# Patient Record
Sex: MALE | Race: WHITE | NOT HISPANIC OR LATINO | Employment: OTHER | URBAN - METROPOLITAN AREA
[De-identification: names, ages, dates, MRNs, and addresses within clinical notes are randomized per-mention and may not be internally consistent; named-entity substitution may affect disease eponyms.]

---

## 2018-05-14 ENCOUNTER — OFFICE VISIT CONVERTED (OUTPATIENT)
Dept: CARDIOLOGY | Facility: CLINIC | Age: 68
End: 2018-05-14
Attending: INTERNAL MEDICINE

## 2018-06-22 ENCOUNTER — OFFICE VISIT CONVERTED (OUTPATIENT)
Dept: FAMILY MEDICINE CLINIC | Facility: CLINIC | Age: 68
End: 2018-06-22
Attending: FAMILY MEDICINE

## 2018-11-19 ENCOUNTER — CONVERSION ENCOUNTER (OUTPATIENT)
Dept: FAMILY MEDICINE CLINIC | Facility: CLINIC | Age: 68
End: 2018-11-19

## 2018-11-19 ENCOUNTER — OFFICE VISIT CONVERTED (OUTPATIENT)
Dept: FAMILY MEDICINE CLINIC | Facility: CLINIC | Age: 68
End: 2018-11-19
Attending: NURSE PRACTITIONER

## 2018-11-26 ENCOUNTER — OFFICE VISIT CONVERTED (OUTPATIENT)
Dept: CARDIOLOGY | Facility: CLINIC | Age: 68
End: 2018-11-26
Attending: INTERNAL MEDICINE

## 2018-12-18 ENCOUNTER — OFFICE VISIT CONVERTED (OUTPATIENT)
Dept: SURGERY | Facility: CLINIC | Age: 68
End: 2018-12-18
Attending: SURGERY

## 2018-12-18 ENCOUNTER — OFFICE VISIT CONVERTED (OUTPATIENT)
Dept: FAMILY MEDICINE CLINIC | Facility: CLINIC | Age: 68
End: 2018-12-18
Attending: FAMILY MEDICINE

## 2019-03-20 ENCOUNTER — HOSPITAL ENCOUNTER (OUTPATIENT)
Dept: OTHER | Facility: HOSPITAL | Age: 69
Discharge: HOME | End: 2019-03-20
Attending: INTERNAL MEDICINE

## 2019-03-20 LAB
ALBUMIN SERPL-MCNC: 4.3 G/DL (ref 3.5–5)
ALBUMIN/GLOB SERPL: 1.3 {RATIO} (ref 1.4–2.6)
ALP SERPL-CCNC: 57 U/L (ref 56–155)
ALT SERPL-CCNC: 19 U/L (ref 10–40)
ANION GAP SERPL CALC-SCNC: 19 MMOL/L (ref 8–19)
AST SERPL-CCNC: 23 U/L (ref 15–50)
BILIRUB SERPL-MCNC: 0.37 MG/DL (ref 0.2–1.3)
BUN SERPL-MCNC: 25 MG/DL (ref 5–25)
BUN/CREAT SERPL: 19 {RATIO} (ref 6–20)
CALCIUM SERPL-MCNC: 9.5 MG/DL (ref 8.7–10.4)
CHLORIDE SERPL-SCNC: 101 MMOL/L (ref 99–111)
CHOLEST SERPL-MCNC: 123 MG/DL (ref 107–200)
CHOLEST/HDLC SERPL: 2.1 {RATIO} (ref 3–6)
CONV CO2: 26 MMOL/L (ref 22–32)
CONV TOTAL PROTEIN: 7.5 G/DL (ref 6.3–8.2)
CREAT UR-MCNC: 1.34 MG/DL (ref 0.7–1.2)
GFR SERPLBLD BASED ON 1.73 SQ M-ARVRAT: 53 ML/MIN/{1.73_M2}
GLOBULIN UR ELPH-MCNC: 3.2 G/DL (ref 2–3.5)
GLUCOSE SERPL-MCNC: 115 MG/DL (ref 70–99)
HDLC SERPL-MCNC: 58 MG/DL (ref 40–60)
LDLC SERPL CALC-MCNC: 55 MG/DL (ref 70–100)
OSMOLALITY SERPL CALC.SUM OF ELEC: 297 MOSM/KG (ref 273–304)
POTASSIUM SERPL-SCNC: 4.8 MMOL/L (ref 3.5–5.3)
SODIUM SERPL-SCNC: 141 MMOL/L (ref 135–147)
TRIGL SERPL-MCNC: 48 MG/DL (ref 40–150)
VLDLC SERPL-MCNC: 10 MG/DL (ref 5–37)

## 2019-04-22 ENCOUNTER — HOSPITAL ENCOUNTER (OUTPATIENT)
Dept: GASTROENTEROLOGY | Facility: HOSPITAL | Age: 69
Setting detail: HOSPITAL OUTPATIENT SURGERY
Discharge: HOME | End: 2019-04-22
Attending: SURGERY

## 2019-05-08 ENCOUNTER — HOSPITAL ENCOUNTER (OUTPATIENT)
Dept: LAB | Facility: HOSPITAL | Age: 69
Discharge: HOME | End: 2019-05-08
Attending: INTERNAL MEDICINE

## 2019-05-08 LAB
ANION GAP SERPL CALC-SCNC: 15 MMOL/L (ref 8–19)
BUN SERPL-MCNC: 26 MG/DL (ref 5–25)
BUN/CREAT SERPL: 20 {RATIO} (ref 6–20)
CALCIUM SERPL-MCNC: 9.4 MG/DL (ref 8.7–10.4)
CHLORIDE SERPL-SCNC: 102 MMOL/L (ref 99–111)
CONV CO2: 28 MMOL/L (ref 22–32)
CREAT UR-MCNC: 1.32 MG/DL (ref 0.7–1.2)
GFR SERPLBLD BASED ON 1.73 SQ M-ARVRAT: 54 ML/MIN/{1.73_M2}
GLUCOSE SERPL-MCNC: 128 MG/DL (ref 70–99)
OSMOLALITY SERPL CALC.SUM OF ELEC: 298 MOSM/KG (ref 273–304)
POTASSIUM SERPL-SCNC: 3.7 MMOL/L (ref 3.5–5.3)
SODIUM SERPL-SCNC: 141 MMOL/L (ref 135–147)

## 2019-05-29 ENCOUNTER — HOSPITAL ENCOUNTER (OUTPATIENT)
Dept: LAB | Facility: HOSPITAL | Age: 69
Discharge: HOME | End: 2019-05-29
Attending: INTERNAL MEDICINE

## 2019-05-29 LAB
ALBUMIN SERPL-MCNC: 4.3 G/DL (ref 3.5–5)
ALBUMIN/GLOB SERPL: 1.5 {RATIO} (ref 1.4–2.6)
ALP SERPL-CCNC: 59 U/L (ref 56–155)
ALT SERPL-CCNC: 18 U/L (ref 10–40)
ANION GAP SERPL CALC-SCNC: 18 MMOL/L (ref 8–19)
AST SERPL-CCNC: 25 U/L (ref 15–50)
BILIRUB SERPL-MCNC: 0.34 MG/DL (ref 0.2–1.3)
BUN SERPL-MCNC: 28 MG/DL (ref 5–25)
BUN/CREAT SERPL: 20 {RATIO} (ref 6–20)
CALCIUM SERPL-MCNC: 9.5 MG/DL (ref 8.7–10.4)
CHLORIDE SERPL-SCNC: 103 MMOL/L (ref 99–111)
CHOLEST SERPL-MCNC: 110 MG/DL (ref 107–200)
CHOLEST/HDLC SERPL: 2.2 {RATIO} (ref 3–6)
CONV CO2: 25 MMOL/L (ref 22–32)
CONV TOTAL PROTEIN: 7.2 G/DL (ref 6.3–8.2)
CREAT UR-MCNC: 1.38 MG/DL (ref 0.7–1.2)
GFR SERPLBLD BASED ON 1.73 SQ M-ARVRAT: 51 ML/MIN/{1.73_M2}
GLOBULIN UR ELPH-MCNC: 2.9 G/DL (ref 2–3.5)
GLUCOSE SERPL-MCNC: 118 MG/DL (ref 70–99)
HDLC SERPL-MCNC: 49 MG/DL (ref 40–60)
LDLC SERPL CALC-MCNC: 50 MG/DL (ref 70–100)
OSMOLALITY SERPL CALC.SUM OF ELEC: 301 MOSM/KG (ref 273–304)
POTASSIUM SERPL-SCNC: 4.4 MMOL/L (ref 3.5–5.3)
SODIUM SERPL-SCNC: 142 MMOL/L (ref 135–147)
TRIGL SERPL-MCNC: 55 MG/DL (ref 40–150)
VLDLC SERPL-MCNC: 11 MG/DL (ref 5–37)

## 2019-06-05 ENCOUNTER — CONVERSION ENCOUNTER (OUTPATIENT)
Dept: CARDIOLOGY | Facility: CLINIC | Age: 69
End: 2019-06-05

## 2019-06-05 ENCOUNTER — OFFICE VISIT CONVERTED (OUTPATIENT)
Dept: CARDIOLOGY | Facility: CLINIC | Age: 69
End: 2019-06-05
Attending: INTERNAL MEDICINE

## 2019-06-18 ENCOUNTER — HOSPITAL ENCOUNTER (OUTPATIENT)
Dept: LAB | Facility: HOSPITAL | Age: 69
Discharge: HOME | End: 2019-06-18
Attending: FAMILY MEDICINE

## 2019-06-18 ENCOUNTER — OFFICE VISIT CONVERTED (OUTPATIENT)
Dept: FAMILY MEDICINE CLINIC | Facility: CLINIC | Age: 69
End: 2019-06-18
Attending: FAMILY MEDICINE

## 2019-06-18 ENCOUNTER — CONVERSION ENCOUNTER (OUTPATIENT)
Dept: OTHER | Facility: HOSPITAL | Age: 69
End: 2019-06-18

## 2019-06-18 LAB
CONV CREATININE URINE, RANDOM: 52 MG/DL (ref 10–300)
CONV MICROALBUM.,U,RANDOM: 22.7 MG/L (ref 0–20)
EST. AVERAGE GLUCOSE BLD GHB EST-MCNC: 126 MG/DL
HBA1C MFR BLD: 6 % (ref 3.5–5.7)
MICROALBUMIN/CREAT UR: 43.7 MG/G{CRE} (ref 0–25)

## 2019-10-28 ENCOUNTER — OFFICE VISIT CONVERTED (OUTPATIENT)
Dept: FAMILY MEDICINE CLINIC | Facility: CLINIC | Age: 69
End: 2019-10-28
Attending: FAMILY MEDICINE

## 2019-11-26 ENCOUNTER — HOSPITAL ENCOUNTER (OUTPATIENT)
Dept: OTHER | Facility: HOSPITAL | Age: 69
Discharge: HOME | End: 2019-11-26
Attending: FAMILY MEDICINE

## 2019-11-26 LAB
ALBUMIN SERPL-MCNC: 4.3 G/DL (ref 3.5–5)
ALBUMIN/GLOB SERPL: 1.4 {RATIO} (ref 1.4–2.6)
ALP SERPL-CCNC: 61 U/L (ref 56–155)
ALT SERPL-CCNC: 16 U/L (ref 10–40)
ANION GAP SERPL CALC-SCNC: 16 MMOL/L (ref 8–19)
AST SERPL-CCNC: 21 U/L (ref 15–50)
BILIRUB SERPL-MCNC: 0.28 MG/DL (ref 0.2–1.3)
BUN SERPL-MCNC: 24 MG/DL (ref 5–25)
BUN/CREAT SERPL: 20 {RATIO} (ref 6–20)
CALCIUM SERPL-MCNC: 9.7 MG/DL (ref 8.7–10.4)
CHLORIDE SERPL-SCNC: 100 MMOL/L (ref 99–111)
CHOLEST SERPL-MCNC: 122 MG/DL (ref 107–200)
CHOLEST/HDLC SERPL: 2.5 {RATIO} (ref 3–6)
CONV CO2: 27 MMOL/L (ref 22–32)
CONV TOTAL PROTEIN: 7.4 G/DL (ref 6.3–8.2)
CREAT UR-MCNC: 1.22 MG/DL (ref 0.7–1.2)
EST. AVERAGE GLUCOSE BLD GHB EST-MCNC: 146 MG/DL
GFR SERPLBLD BASED ON 1.73 SQ M-ARVRAT: 59 ML/MIN/{1.73_M2}
GLOBULIN UR ELPH-MCNC: 3.1 G/DL (ref 2–3.5)
GLUCOSE SERPL-MCNC: 130 MG/DL (ref 70–99)
HBA1C MFR BLD: 6.7 % (ref 3.5–5.7)
HDLC SERPL-MCNC: 49 MG/DL (ref 40–60)
LDLC SERPL CALC-MCNC: 52 MG/DL (ref 70–100)
OSMOLALITY SERPL CALC.SUM OF ELEC: 294 MOSM/KG (ref 273–304)
POTASSIUM SERPL-SCNC: 4.3 MMOL/L (ref 3.5–5.3)
SODIUM SERPL-SCNC: 139 MMOL/L (ref 135–147)
TRIGL SERPL-MCNC: 105 MG/DL (ref 40–150)
VLDLC SERPL-MCNC: 21 MG/DL (ref 5–37)

## 2019-12-09 ENCOUNTER — OFFICE VISIT CONVERTED (OUTPATIENT)
Dept: CARDIOLOGY | Facility: CLINIC | Age: 69
End: 2019-12-09
Attending: INTERNAL MEDICINE

## 2019-12-09 ENCOUNTER — CONVERSION ENCOUNTER (OUTPATIENT)
Dept: CARDIOLOGY | Facility: CLINIC | Age: 69
End: 2019-12-09

## 2019-12-11 ENCOUNTER — CONVERSION ENCOUNTER (OUTPATIENT)
Dept: CARDIOLOGY | Facility: CLINIC | Age: 69
End: 2019-12-11
Attending: INTERNAL MEDICINE

## 2020-01-14 ENCOUNTER — OFFICE VISIT CONVERTED (OUTPATIENT)
Dept: ORTHOPEDIC SURGERY | Facility: CLINIC | Age: 70
End: 2020-01-14
Attending: ORTHOPAEDIC SURGERY

## 2020-01-14 ENCOUNTER — CONVERSION ENCOUNTER (OUTPATIENT)
Dept: ORTHOPEDIC SURGERY | Facility: CLINIC | Age: 70
End: 2020-01-14

## 2020-01-20 ENCOUNTER — OFFICE VISIT CONVERTED (OUTPATIENT)
Dept: SURGERY | Facility: CLINIC | Age: 70
End: 2020-01-20
Attending: SURGERY

## 2020-04-28 ENCOUNTER — TELEPHONE CONVERTED (OUTPATIENT)
Dept: FAMILY MEDICINE CLINIC | Facility: CLINIC | Age: 70
End: 2020-04-28
Attending: INTERNAL MEDICINE

## 2020-06-01 ENCOUNTER — HOSPITAL ENCOUNTER (OUTPATIENT)
Dept: LAB | Facility: HOSPITAL | Age: 70
Discharge: HOME | End: 2020-06-01
Attending: INTERNAL MEDICINE

## 2020-06-01 LAB
ALBUMIN SERPL-MCNC: 4.3 G/DL (ref 3.5–5)
ALBUMIN/GLOB SERPL: 1.3 {RATIO} (ref 1.4–2.6)
ALP SERPL-CCNC: 64 U/L (ref 56–155)
ALT SERPL-CCNC: 13 U/L (ref 10–40)
ANION GAP SERPL CALC-SCNC: 17 MMOL/L (ref 8–19)
AST SERPL-CCNC: 20 U/L (ref 15–50)
BILIRUB SERPL-MCNC: 0.36 MG/DL (ref 0.2–1.3)
BUN SERPL-MCNC: 25 MG/DL (ref 5–25)
BUN/CREAT SERPL: 20 {RATIO} (ref 6–20)
CALCIUM SERPL-MCNC: 9.8 MG/DL (ref 8.7–10.4)
CHLORIDE SERPL-SCNC: 100 MMOL/L (ref 99–111)
CHOLEST SERPL-MCNC: 125 MG/DL (ref 107–200)
CHOLEST/HDLC SERPL: 2.5 {RATIO} (ref 3–6)
CONV CO2: 25 MMOL/L (ref 22–32)
CONV TOTAL PROTEIN: 7.7 G/DL (ref 6.3–8.2)
CREAT UR-MCNC: 1.24 MG/DL (ref 0.7–1.2)
GFR SERPLBLD BASED ON 1.73 SQ M-ARVRAT: 58 ML/MIN/{1.73_M2}
GLOBULIN UR ELPH-MCNC: 3.4 G/DL (ref 2–3.5)
GLUCOSE SERPL-MCNC: 120 MG/DL (ref 70–99)
HDLC SERPL-MCNC: 51 MG/DL (ref 40–60)
LDLC SERPL CALC-MCNC: 57 MG/DL (ref 70–100)
OSMOLALITY SERPL CALC.SUM OF ELEC: 290 MOSM/KG (ref 273–304)
POTASSIUM SERPL-SCNC: 4.6 MMOL/L (ref 3.5–5.3)
SODIUM SERPL-SCNC: 137 MMOL/L (ref 135–147)
T4 FREE SERPL-MCNC: 1.1 NG/DL (ref 0.9–1.8)
TRIGL SERPL-MCNC: 87 MG/DL (ref 40–150)
TSH SERPL-ACNC: 2.12 M[IU]/L (ref 0.27–4.2)
VLDLC SERPL-MCNC: 17 MG/DL (ref 5–37)

## 2020-07-02 ENCOUNTER — OFFICE VISIT CONVERTED (OUTPATIENT)
Dept: CARDIOLOGY | Facility: CLINIC | Age: 70
End: 2020-07-02
Attending: INTERNAL MEDICINE

## 2020-07-06 ENCOUNTER — HOSPITAL ENCOUNTER (OUTPATIENT)
Dept: PREADMISSION TESTING | Facility: HOSPITAL | Age: 70
Discharge: HOME | End: 2020-07-06
Attending: SURGERY

## 2020-07-06 ENCOUNTER — CONVERSION ENCOUNTER (OUTPATIENT)
Dept: PREADMISSION TESTING | Facility: HOSPITAL | Age: 70
End: 2020-07-06

## 2020-07-06 LAB
ANION GAP SERPL CALC-SCNC: 14 MMOL/L (ref 8–19)
BUN SERPL-MCNC: 28 MG/DL (ref 5–25)
BUN/CREAT SERPL: 21 {RATIO} (ref 6–20)
CALCIUM SERPL-MCNC: 9.6 MG/DL (ref 8.7–10.4)
CHLORIDE SERPL-SCNC: 103 MMOL/L (ref 99–111)
CONV CO2: 25 MMOL/L (ref 22–32)
CREAT UR-MCNC: 1.32 MG/DL (ref 0.7–1.2)
GFR SERPLBLD BASED ON 1.73 SQ M-ARVRAT: 54 ML/MIN/{1.73_M2}
GLUCOSE SERPL-MCNC: 139 MG/DL (ref 70–99)
OSMOLALITY SERPL CALC.SUM OF ELEC: 292 MOSM/KG (ref 273–304)
POTASSIUM SERPL-SCNC: 4.7 MMOL/L (ref 3.5–5.3)
SODIUM SERPL-SCNC: 137 MMOL/L (ref 135–147)

## 2020-07-07 LAB — SARS-COV-2 RNA SPEC QL NAA+PROBE: NOT DETECTED

## 2020-07-08 ENCOUNTER — HOSPITAL ENCOUNTER (OUTPATIENT)
Dept: PERIOP | Facility: HOSPITAL | Age: 70
Setting detail: HOSPITAL OUTPATIENT SURGERY
Discharge: HOME | End: 2020-07-08
Attending: SURGERY

## 2020-07-21 ENCOUNTER — OFFICE VISIT CONVERTED (OUTPATIENT)
Dept: SURGERY | Facility: CLINIC | Age: 70
End: 2020-07-21
Attending: SURGERY

## 2020-09-25 ENCOUNTER — OFFICE VISIT CONVERTED (OUTPATIENT)
Dept: SURGERY | Facility: CLINIC | Age: 70
End: 2020-09-25
Attending: SURGERY

## 2020-10-16 ENCOUNTER — HOSPITAL ENCOUNTER (OUTPATIENT)
Dept: GENERAL RADIOLOGY | Facility: HOSPITAL | Age: 70
Discharge: HOME | End: 2020-10-16
Attending: SURGERY

## 2020-10-20 ENCOUNTER — OFFICE VISIT CONVERTED (OUTPATIENT)
Dept: SURGERY | Facility: CLINIC | Age: 70
End: 2020-10-20
Attending: SURGERY

## 2020-10-20 ENCOUNTER — CONVERSION ENCOUNTER (OUTPATIENT)
Dept: SURGERY | Facility: CLINIC | Age: 70
End: 2020-10-20

## 2020-10-20 ENCOUNTER — OFFICE VISIT CONVERTED (OUTPATIENT)
Dept: ORTHOPEDIC SURGERY | Facility: CLINIC | Age: 70
End: 2020-10-20
Attending: ORTHOPAEDIC SURGERY

## 2020-10-20 ENCOUNTER — CONVERSION ENCOUNTER (OUTPATIENT)
Dept: ORTHOPEDIC SURGERY | Facility: CLINIC | Age: 70
End: 2020-10-20

## 2020-10-28 ENCOUNTER — HOSPITAL ENCOUNTER (OUTPATIENT)
Dept: ULTRASOUND IMAGING | Facility: HOSPITAL | Age: 70
Discharge: HOME | End: 2020-10-28
Attending: SURGERY

## 2020-10-28 ENCOUNTER — HOSPITAL ENCOUNTER (OUTPATIENT)
Dept: LAB | Facility: HOSPITAL | Age: 70
Discharge: HOME | End: 2020-10-28
Attending: PSYCHIATRY & NEUROLOGY

## 2020-10-28 ENCOUNTER — OFFICE VISIT CONVERTED (OUTPATIENT)
Dept: FAMILY MEDICINE CLINIC | Facility: CLINIC | Age: 70
End: 2020-10-28
Attending: INTERNAL MEDICINE

## 2020-10-28 LAB
ALBUMIN SERPL-MCNC: 4.2 G/DL (ref 3.5–5)
ALBUMIN/GLOB SERPL: 1.2 {RATIO} (ref 1.4–2.6)
ALP SERPL-CCNC: 65 U/L (ref 56–155)
ALT SERPL-CCNC: 11 U/L (ref 10–40)
ANION GAP SERPL CALC-SCNC: 17 MMOL/L (ref 8–19)
AST SERPL-CCNC: 16 U/L (ref 15–50)
BASOPHILS # BLD AUTO: 0.07 10*3/UL (ref 0–0.2)
BASOPHILS NFR BLD AUTO: 0.8 % (ref 0–3)
BILIRUB SERPL-MCNC: 0.23 MG/DL (ref 0.2–1.3)
BUN SERPL-MCNC: 31 MG/DL (ref 5–25)
BUN/CREAT SERPL: 22 {RATIO} (ref 6–20)
CALCIUM SERPL-MCNC: 10.2 MG/DL (ref 8.7–10.4)
CHLORIDE SERPL-SCNC: 102 MMOL/L (ref 99–111)
CHOLEST SERPL-MCNC: 123 MG/DL (ref 107–200)
CHOLEST/HDLC SERPL: 2.6 {RATIO} (ref 3–6)
CONV ABS IMM GRAN: 0.02 10*3/UL (ref 0–0.2)
CONV CO2: 26 MMOL/L (ref 22–32)
CONV CREATININE URINE, RANDOM: 102.2 MG/DL (ref 10–300)
CONV IMMATURE GRAN: 0.2 % (ref 0–1.8)
CONV MICROALBUM.,U,RANDOM: 70.6 MG/L (ref 0–20)
CONV TOTAL PROTEIN: 7.7 G/DL (ref 6.3–8.2)
CREAT UR-MCNC: 1.41 MG/DL (ref 0.7–1.2)
DEPRECATED RDW RBC AUTO: 47.9 FL (ref 35.1–43.9)
EOSINOPHIL # BLD AUTO: 0.35 10*3/UL (ref 0–0.7)
EOSINOPHIL # BLD AUTO: 4 % (ref 0–7)
ERYTHROCYTE [DISTWIDTH] IN BLOOD BY AUTOMATED COUNT: 14.1 % (ref 11.6–14.4)
EST. AVERAGE GLUCOSE BLD GHB EST-MCNC: 143 MG/DL
GFR SERPLBLD BASED ON 1.73 SQ M-ARVRAT: 49 ML/MIN/{1.73_M2}
GLOBULIN UR ELPH-MCNC: 3.5 G/DL (ref 2–3.5)
GLUCOSE SERPL-MCNC: 124 MG/DL (ref 70–99)
HBA1C MFR BLD: 6.6 % (ref 3.5–5.7)
HCT VFR BLD AUTO: 36.8 % (ref 42–52)
HDLC SERPL-MCNC: 48 MG/DL (ref 40–60)
HGB BLD-MCNC: 11.3 G/DL (ref 14–18)
LDLC SERPL CALC-MCNC: 62 MG/DL (ref 70–100)
LYMPHOCYTES # BLD AUTO: 1.18 10*3/UL (ref 1–5)
LYMPHOCYTES NFR BLD AUTO: 13.6 % (ref 20–45)
MCH RBC QN AUTO: 28.2 PG (ref 27–31)
MCHC RBC AUTO-ENTMCNC: 30.7 G/DL (ref 33–37)
MCV RBC AUTO: 91.8 FL (ref 80–96)
MICROALBUMIN/CREAT UR: 69.1 MG/G{CRE} (ref 0–25)
MONOCYTES # BLD AUTO: 0.86 10*3/UL (ref 0.2–1.2)
MONOCYTES NFR BLD AUTO: 9.9 % (ref 3–10)
NEUTROPHILS # BLD AUTO: 6.22 10*3/UL (ref 2–8)
NEUTROPHILS NFR BLD AUTO: 71.5 % (ref 30–85)
NRBC CBCN: 0 % (ref 0–0.7)
OSMOLALITY SERPL CALC.SUM OF ELEC: 300 MOSM/KG (ref 273–304)
PLATELET # BLD AUTO: 339 10*3/UL (ref 130–400)
PMV BLD AUTO: 9.2 FL (ref 9.4–12.4)
POTASSIUM SERPL-SCNC: 4.2 MMOL/L (ref 3.5–5.3)
RBC # BLD AUTO: 4.01 10*6/UL (ref 4.7–6.1)
SODIUM SERPL-SCNC: 141 MMOL/L (ref 135–147)
TRIGL SERPL-MCNC: 64 MG/DL (ref 40–150)
VLDLC SERPL-MCNC: 13 MG/DL (ref 5–37)
WBC # BLD AUTO: 8.7 10*3/UL (ref 4.8–10.8)

## 2020-11-25 ENCOUNTER — HOSPITAL ENCOUNTER (OUTPATIENT)
Dept: LAB | Facility: HOSPITAL | Age: 70
Discharge: HOME | End: 2020-11-25
Attending: PSYCHIATRY & NEUROLOGY

## 2020-11-25 LAB
ANION GAP SERPL CALC-SCNC: 17 MMOL/L (ref 8–19)
BUN SERPL-MCNC: 26 MG/DL (ref 5–25)
BUN/CREAT SERPL: 17 {RATIO} (ref 6–20)
CALCIUM SERPL-MCNC: 10.2 MG/DL (ref 8.7–10.4)
CHLORIDE SERPL-SCNC: 105 MMOL/L (ref 99–111)
CONV CO2: 24 MMOL/L (ref 22–32)
CREAT UR-MCNC: 1.49 MG/DL (ref 0.7–1.2)
FERRITIN SERPL-MCNC: 173 NG/ML (ref 30–300)
GFR SERPLBLD BASED ON 1.73 SQ M-ARVRAT: 46 ML/MIN/{1.73_M2}
GLUCOSE SERPL-MCNC: 108 MG/DL (ref 70–99)
IRON SATN MFR SERPL: 17 % (ref 20–55)
IRON SERPL-MCNC: 53 UG/DL (ref 70–180)
OSMOLALITY SERPL CALC.SUM OF ELEC: 297 MOSM/KG (ref 273–304)
POTASSIUM SERPL-SCNC: 4.7 MMOL/L (ref 3.5–5.3)
SODIUM SERPL-SCNC: 141 MMOL/L (ref 135–147)
TIBC SERPL-MCNC: 320 UG/DL (ref 245–450)
TRANSFERRIN SERPL-MCNC: 224 MG/DL (ref 215–365)

## 2020-12-01 ENCOUNTER — OFFICE VISIT CONVERTED (OUTPATIENT)
Dept: FAMILY MEDICINE CLINIC | Facility: CLINIC | Age: 70
End: 2020-12-01
Attending: INTERNAL MEDICINE

## 2020-12-01 ENCOUNTER — CONVERSION ENCOUNTER (OUTPATIENT)
Dept: FAMILY MEDICINE CLINIC | Facility: CLINIC | Age: 70
End: 2020-12-01

## 2020-12-08 ENCOUNTER — OFFICE VISIT CONVERTED (OUTPATIENT)
Dept: ORTHOPEDIC SURGERY | Facility: CLINIC | Age: 70
End: 2020-12-08
Attending: PHYSICIAN ASSISTANT

## 2020-12-18 ENCOUNTER — HOSPITAL ENCOUNTER (OUTPATIENT)
Dept: LAB | Facility: HOSPITAL | Age: 70
Discharge: HOME | End: 2020-12-18
Attending: NURSE PRACTITIONER

## 2020-12-18 LAB
ALBUMIN SERPL-MCNC: 4.4 G/DL (ref 3.5–5)
ALBUMIN/GLOB SERPL: 1.3 {RATIO} (ref 1.4–2.6)
ALP SERPL-CCNC: 68 U/L (ref 56–155)
ALT SERPL-CCNC: 14 U/L (ref 10–40)
ANION GAP SERPL CALC-SCNC: 18 MMOL/L (ref 8–19)
AST SERPL-CCNC: 20 U/L (ref 15–50)
BASOPHILS # BLD AUTO: 0.08 10*3/UL (ref 0–0.2)
BASOPHILS NFR BLD AUTO: 1.1 % (ref 0–3)
BILIRUB SERPL-MCNC: 0.24 MG/DL (ref 0.2–1.3)
BUN SERPL-MCNC: 37 MG/DL (ref 5–25)
BUN/CREAT SERPL: 23 {RATIO} (ref 6–20)
CALCIUM SERPL-MCNC: 10.3 MG/DL (ref 8.7–10.4)
CHLORIDE SERPL-SCNC: 102 MMOL/L (ref 99–111)
CHOLEST SERPL-MCNC: 121 MG/DL (ref 107–200)
CHOLEST/HDLC SERPL: 2.3 {RATIO} (ref 3–6)
CONV ABS IMM GRAN: 0.02 10*3/UL (ref 0–0.2)
CONV CO2: 25 MMOL/L (ref 22–32)
CONV IMMATURE GRAN: 0.3 % (ref 0–1.8)
CONV TOTAL PROTEIN: 7.7 G/DL (ref 6.3–8.2)
CREAT UR-MCNC: 1.6 MG/DL (ref 0.7–1.2)
DEPRECATED RDW RBC AUTO: 48.4 FL (ref 35.1–43.9)
EOSINOPHIL # BLD AUTO: 0.33 10*3/UL (ref 0–0.7)
EOSINOPHIL # BLD AUTO: 4.3 % (ref 0–7)
ERYTHROCYTE [DISTWIDTH] IN BLOOD BY AUTOMATED COUNT: 14.6 % (ref 11.6–14.4)
GFR SERPLBLD BASED ON 1.73 SQ M-ARVRAT: 42 ML/MIN/{1.73_M2}
GLOBULIN UR ELPH-MCNC: 3.3 G/DL (ref 2–3.5)
GLUCOSE SERPL-MCNC: 117 MG/DL (ref 70–99)
HCT VFR BLD AUTO: 38.1 % (ref 42–52)
HDLC SERPL-MCNC: 53 MG/DL (ref 40–60)
HGB BLD-MCNC: 11.9 G/DL (ref 14–18)
LDLC SERPL CALC-MCNC: 57 MG/DL (ref 70–100)
LYMPHOCYTES # BLD AUTO: 1.25 10*3/UL (ref 1–5)
LYMPHOCYTES NFR BLD AUTO: 16.4 % (ref 20–45)
MCH RBC QN AUTO: 28 PG (ref 27–31)
MCHC RBC AUTO-ENTMCNC: 31.2 G/DL (ref 33–37)
MCV RBC AUTO: 89.6 FL (ref 80–96)
MONOCYTES # BLD AUTO: 0.78 10*3/UL (ref 0.2–1.2)
MONOCYTES NFR BLD AUTO: 10.2 % (ref 3–10)
NEUTROPHILS # BLD AUTO: 5.15 10*3/UL (ref 2–8)
NEUTROPHILS NFR BLD AUTO: 67.7 % (ref 30–85)
NRBC CBCN: 0 % (ref 0–0.7)
OSMOLALITY SERPL CALC.SUM OF ELEC: 300 MOSM/KG (ref 273–304)
PLATELET # BLD AUTO: 305 10*3/UL (ref 130–400)
PMV BLD AUTO: 9.6 FL (ref 9.4–12.4)
POTASSIUM SERPL-SCNC: 4.7 MMOL/L (ref 3.5–5.3)
RBC # BLD AUTO: 4.25 10*6/UL (ref 4.7–6.1)
SODIUM SERPL-SCNC: 140 MMOL/L (ref 135–147)
TRIGL SERPL-MCNC: 56 MG/DL (ref 40–150)
VLDLC SERPL-MCNC: 11 MG/DL (ref 5–37)
WBC # BLD AUTO: 7.61 10*3/UL (ref 4.8–10.8)

## 2021-01-04 ENCOUNTER — OFFICE VISIT CONVERTED (OUTPATIENT)
Dept: CARDIOLOGY | Facility: CLINIC | Age: 71
End: 2021-01-04
Attending: INTERNAL MEDICINE

## 2021-01-12 ENCOUNTER — HOSPITAL ENCOUNTER (OUTPATIENT)
Dept: OTHER | Facility: HOSPITAL | Age: 71
Discharge: HOME | End: 2021-01-12
Attending: STUDENT IN AN ORGANIZED HEALTH CARE EDUCATION/TRAINING PROGRAM

## 2021-01-12 ENCOUNTER — OFFICE VISIT CONVERTED (OUTPATIENT)
Dept: INTERNAL MEDICINE | Facility: CLINIC | Age: 71
End: 2021-01-12
Attending: STUDENT IN AN ORGANIZED HEALTH CARE EDUCATION/TRAINING PROGRAM

## 2021-01-12 LAB
ALBUMIN SERPL-MCNC: 4.1 G/DL (ref 3.5–5)
ALBUMIN/GLOB SERPL: 1.3 {RATIO} (ref 1.4–2.6)
ALP SERPL-CCNC: 51 U/L (ref 56–155)
ALT SERPL-CCNC: 11 U/L (ref 10–40)
ANION GAP SERPL CALC-SCNC: 16 MMOL/L (ref 8–19)
AST SERPL-CCNC: 21 U/L (ref 15–50)
BILIRUB SERPL-MCNC: 0.47 MG/DL (ref 0.2–1.3)
BUN SERPL-MCNC: 21 MG/DL (ref 5–25)
BUN/CREAT SERPL: 23 {RATIO} (ref 6–20)
CALCIUM SERPL-MCNC: 9.7 MG/DL (ref 8.7–10.4)
CHLORIDE SERPL-SCNC: 105 MMOL/L (ref 99–111)
CHOLEST SERPL-MCNC: 219 MG/DL (ref 107–200)
CHOLEST/HDLC SERPL: 2.2 {RATIO} (ref 3–6)
CONV CO2: 25 MMOL/L (ref 22–32)
CONV CREATININE URINE, RANDOM: 47.6 MG/DL (ref 10–300)
CONV MICROALBUM.,U,RANDOM: 53 MG/L (ref 0–20)
CONV TOTAL PROTEIN: 7.3 G/DL (ref 6.3–8.2)
CREAT UR-MCNC: 0.93 MG/DL (ref 0.7–1.2)
EST. AVERAGE GLUCOSE BLD GHB EST-MCNC: 131 MG/DL
FERRITIN SERPL-MCNC: 61 NG/ML (ref 30–300)
FOLATE SERPL-MCNC: >20 NG/ML (ref 4.8–20)
GFR SERPLBLD BASED ON 1.73 SQ M-ARVRAT: >60 ML/MIN/{1.73_M2}
GLOBULIN UR ELPH-MCNC: 3.2 G/DL (ref 2–3.5)
GLUCOSE SERPL-MCNC: 80 MG/DL (ref 70–99)
HBA1C MFR BLD: 6.2 % (ref 3.5–5.7)
HDLC SERPL-MCNC: 98 MG/DL (ref 40–60)
IRON SATN MFR SERPL: 32 % (ref 20–55)
IRON SERPL-MCNC: 117 UG/DL (ref 70–180)
LDLC SERPL CALC-MCNC: 109 MG/DL (ref 70–100)
MICROALBUMIN/CREAT UR: 111.3 MG/G{CRE} (ref 0–25)
OSMOLALITY SERPL CALC.SUM OF ELEC: 296 MOSM/KG (ref 273–304)
POTASSIUM SERPL-SCNC: 4.2 MMOL/L (ref 3.5–5.3)
RBC # BLD AUTO: 4.22 10*6/UL (ref 4.7–6.1)
RETICS # AUTO: 0.04 10*6/UL (ref 0.03–0.1)
RETICS/RBC NFR AUTO: 0.98 % (ref 0.51–1.81)
SODIUM SERPL-SCNC: 142 MMOL/L (ref 135–147)
TIBC SERPL-MCNC: 369 UG/DL (ref 245–450)
TRANSFERRIN SERPL-MCNC: 258 MG/DL (ref 215–365)
TRIGL SERPL-MCNC: 62 MG/DL (ref 40–150)
VIT B12 SERPL-MCNC: 629 PG/ML (ref 211–911)
VLDLC SERPL-MCNC: 12 MG/DL (ref 5–37)
WBC # BLD AUTO: 7.39 10*3/UL (ref 4.8–10.8)

## 2021-01-21 ENCOUNTER — HOSPITAL ENCOUNTER (OUTPATIENT)
Dept: GENERAL RADIOLOGY | Facility: HOSPITAL | Age: 71
Discharge: HOME | End: 2021-01-21
Attending: STUDENT IN AN ORGANIZED HEALTH CARE EDUCATION/TRAINING PROGRAM

## 2021-02-03 ENCOUNTER — HOSPITAL ENCOUNTER (OUTPATIENT)
Dept: LAB | Facility: HOSPITAL | Age: 71
Discharge: HOME | End: 2021-02-03
Attending: INTERNAL MEDICINE

## 2021-02-03 LAB
ANION GAP SERPL CALC-SCNC: 18 MMOL/L (ref 8–19)
BUN SERPL-MCNC: 28 MG/DL (ref 5–25)
BUN/CREAT SERPL: 25 {RATIO} (ref 6–20)
CALCIUM SERPL-MCNC: 9.6 MG/DL (ref 8.7–10.4)
CHLORIDE SERPL-SCNC: 103 MMOL/L (ref 99–111)
CONV CO2: 24 MMOL/L (ref 22–32)
CREAT UR-MCNC: 1.12 MG/DL (ref 0.7–1.2)
GFR SERPLBLD BASED ON 1.73 SQ M-ARVRAT: >60 ML/MIN/{1.73_M2}
GLUCOSE SERPL-MCNC: 93 MG/DL (ref 70–99)
OSMOLALITY SERPL CALC.SUM OF ELEC: 295 MOSM/KG (ref 273–304)
POTASSIUM SERPL-SCNC: 4.6 MMOL/L (ref 3.5–5.3)
SODIUM SERPL-SCNC: 140 MMOL/L (ref 135–147)

## 2021-03-31 ENCOUNTER — HOSPITAL ENCOUNTER (OUTPATIENT)
Dept: LAB | Facility: HOSPITAL | Age: 71
Discharge: HOME | End: 2021-03-31
Attending: NURSE PRACTITIONER

## 2021-03-31 LAB
ANION GAP SERPL CALC-SCNC: 14 MMOL/L (ref 8–19)
BUN SERPL-MCNC: 26 MG/DL (ref 5–25)
BUN/CREAT SERPL: 20 {RATIO} (ref 6–20)
CALCIUM SERPL-MCNC: 9.6 MG/DL (ref 8.7–10.4)
CHLORIDE SERPL-SCNC: 104 MMOL/L (ref 99–111)
CONV CO2: 26 MMOL/L (ref 22–32)
CREAT UR-MCNC: 1.32 MG/DL (ref 0.7–1.2)
GFR SERPLBLD BASED ON 1.73 SQ M-ARVRAT: 53 ML/MIN/{1.73_M2}
GLUCOSE SERPL-MCNC: 105 MG/DL (ref 70–99)
OSMOLALITY SERPL CALC.SUM OF ELEC: 295 MOSM/KG (ref 273–304)
POTASSIUM SERPL-SCNC: 4.4 MMOL/L (ref 3.5–5.3)
SODIUM SERPL-SCNC: 140 MMOL/L (ref 135–147)

## 2021-04-01 ENCOUNTER — CONVERSION ENCOUNTER (OUTPATIENT)
Dept: INTERNAL MEDICINE | Facility: CLINIC | Age: 71
End: 2021-04-01

## 2021-04-01 ENCOUNTER — OFFICE VISIT CONVERTED (OUTPATIENT)
Dept: INTERNAL MEDICINE | Facility: CLINIC | Age: 71
End: 2021-04-01
Attending: STUDENT IN AN ORGANIZED HEALTH CARE EDUCATION/TRAINING PROGRAM

## 2021-04-23 ENCOUNTER — CONVERSION ENCOUNTER (OUTPATIENT)
Dept: INTERNAL MEDICINE | Facility: CLINIC | Age: 71
End: 2021-04-23

## 2021-05-10 NOTE — H&P
History and Physical      Patient Name: Toñito Kuo   Patient ID: [id number]   Sex: Male   YOB: 1950    Primary Care Provider: Gurmeet Kerns DO   Referring Provider: Gurmeet Kerns DO    Visit Date: October 20, 2020    Provider: Justin Burris MD   Location: [facility]   Location Address: [address] [place] [address]  [id number]   Location Phone: [phone]          Chief Complaint  · Follow Up Office Visit      History Of Present Illness     Mr. Kuo came in today for evaluation. He is continuing to have the same left groin pain. We got a CT scan and he does have a fair amount of fat running along with his left spermatic cord but I don't see a definite recurrence on that scan. His mesh appears to be in good position. He has continued to have the same kind of discomfort. He wouldn't really say it is much better than a month ago.       Past Medical History  CAD (coronary artery disease); Cancer; Chest pain; Coronary atherosclerosis; of native coronary vessel; GERD; Heart Attack; High cholesterol; History of coronary artery disease; Hyperlipemia; Hypertension; Seasonal allergies; Sleep apnea         Past Surgical History  Appendectomy; Colonoscopy; Heart Bypass; heart surgery; Hernia; Testicle Surgery         Medication List  aspirin 81 mg Oral tablet,delayed release (DR/EC); Cialis 10 mg oral tablet; Eliquis 5 mg oral tablet; losartan-hydrochlorothiazide 100-25 mg oral tablet; metoprolol succinate 50 mg oral tablet extended release 24 hr; Nitrostat 0.4 mg sublingual tablet, sublingual; Norco 5-325 mg oral tablet; One-A-Day Men's 50+ Advantage 400- mcg Oral tablet; rosuvastatin 40 mg oral tablet; tramadol 50 mg oral tablet; Voltaren 1 % topical gel         Allergy List  PENICILLINS         Family Medical History  Breast Neoplasm, Malignant; Cancer, Unspecified; Colon Neoplasm; Diabetes, unspecified type; Lung cancer; Melanoma         Social  "History  Alcohol (Current every day); Alcohol Use; Heavy Amount of Exercise (4 or more times weekly) (Unknown); lives with spouse;  (Unknown); .; No known infection risk (Unknown); Recreational Drug Use (Never); Retired.; Tobacco (Never)         Review of Systems  · Cardiovascular  o Denies  o : chest pain, irregular heart beats, rapid heart rate, chest pain on exertion, shortness of breath, lower extremity swelling  · Respiratory  o Denies  o : shortness of breath, wheezing, cough, wheezing, chronic cough, coughing up blood  · Gastrointestinal  o Denies  o : nausea, vomiting, diarrhea, chronic abdominal pain, reflux symptoms      Vitals  Date Time BP Position Site L\R Cuff Size HR RR TEMP (F) WT  HT  BMI kg/m2 BSA m2 O2 Sat FR L/min FiO2 HC       10/20/2020 11:07 AM      64 - R   253lbs 16oz 5'  11\" 35.43 2.4 98 %      10/20/2020 01:05 PM      70 - R   252lbs 16oz 6'   34.31 2.41             Physical Examination     Today on physical exam, again, I don't feel a definite recurrence there.           Assessment  · Postoperative Exam Following Surgery     V67.00       Persistent left groin pain in a gentleman who has had a bilateral robotic inguinal hernia repair. I don't feel a definite recurrence today on physical exam and he does have a fair amount of fat running along with his cord on CT scan but I don't see a definite recurrence there. The mesh appears to be in in good position.     Problems Reconciled  Plan  · Orders  o Ultrasound testicular (07989) - - 10/20/2020  · Medications  o Medications have been Reconciled  o Transition of Care or Provider Policy     We are going to go ahead and prescribe him some Tramadol for the pain and see if that will help. We are also going to get a left testicular ultrasound. He has a history of an undescended left testicle and had a procedure to descend the testicle back when he was a teenager. He is somewhat concerned about the possibility of pain there. We will " await the results of that testicular ultrasound. I did tell him that we could potentially prescribe him some Gabapentin and see if that would help. He wanted to get his ultrasound first and then he will make a decision about whether he might want to try Gabapentin then.             Electronically Signed by: Analilia Barrientos-, -Author on October 21, 2020 09:31:55 AM  Electronically Co-signed by: Justin Burris MD -Reviewer on October 22, 2020 01:49:29 PM

## 2021-05-10 NOTE — H&P
History and Physical      Patient Name: Toñito Kuo   Patient ID: [id number]   Sex: Male   YOB: 1950    Primary Care Provider: Gurmeet Kerns DO   Referring Provider: Gurmeet Kerns DO    Visit Date: October 20, 2020    Provider: Ravinder Hawley MD   Location: [facility]   Location Address: [address] [place] [address]  [id number]   Location Phone: [phone]          Chief Complaint  · Bilateral Hip Pain      History Of Present Illness  Toñito Kuo is a 70 year old /White male who presents today to [facility].      Patient presents today with a chief complaint of bilateral hip pain. Patient initially made appointment for his right hip but his left hip started aggravating him instead. Patient states recently he went on a trip where they were driving a lot and hiking. Patient states one day he hiked 3 miles and went down to a waterfall where the trail was bumpy. Patient states that it caused him a lot of pain in his left hip. Patient states he has trouble putting his sock on and lifting his hip. Patient states he thought he was overdoing it on his trip. Patient states his right hip isn't bothering him much today. Patient had hernia surgery recently and states it could be his hip that is causing him pain or maybe post-op issues from the surgery. Patient will be seen for his hernia later this afternoon. Patient states pain is on the lateral aspect of his left hip. Patient states that he has been having difficulty sleeping at night due to his hip pain.       Past Medical History  CAD (coronary artery disease); Cancer; Chest pain; Coronary atherosclerosis; of native coronary vessel; GERD; Heart Attack; High cholesterol; History of coronary artery disease; Hyperlipemia; Hypertension; Seasonal allergies; Sleep apnea         Past Surgical History  Appendectomy; Colonoscopy; Heart Bypass; heart surgery; Hernia; Testicle Surgery         Medication List  aspirin  81 mg Oral tablet,delayed release (DR/EC); Cialis 10 mg oral tablet; Eliquis 5 mg oral tablet; losartan-hydrochlorothiazide 100-25 mg oral tablet; meloxicam 15 mg oral tablet; metoprolol succinate 50 mg oral tablet extended release 24 hr; Nitrostat 0.4 mg sublingual tablet, sublingual; Norco 5-325 mg oral tablet; One-A-Day Men's 50+ Advantage 400- mcg Oral tablet; rosuvastatin 40 mg oral tablet; tramadol 50 mg oral tablet; Voltaren 1 % topical gel         Allergy List  PENICILLINS       Allergies Reconciled  Family Medical History  Breast Neoplasm, Malignant; Cancer, Unspecified; Colon Neoplasm; Diabetes, unspecified type; Lung cancer; Melanoma         Social History  Alcohol (Current every day); Alcohol Use; Heavy Amount of Exercise (4 or more times weekly) (Unknown); lives with spouse;  (Unknown); .; No known infection risk (Unknown); Recreational Drug Use (Never); Retired.; Tobacco (Never)         Immunizations  Name Date Admin   Influenza 09/25/2020   Influenza 10/28/2019   Influenza 10/22/2018   Influenza 10/19/2016   Influenza 10/19/2015   Influenza 10/01/2014   Influenza 11/06/2013   Pneumococcal 10/20/2015   Pneumococcal 10/20/2015   Pneumococcal 11/06/2013   Shingles 05/30/2013   Td 01/05/2006   Tdap 04/12/2017         Review of Systems  · Constitutional  o Denies  o : fever, chills, weight loss  · Cardiovascular  o Denies  o : chest pain, shortness of breath  · Gastrointestinal  o Denies  o : liver disease, heartburn, nausea, blood in stools  · Genitourinary  o Denies  o : painful urination, blood in urine  · Integument  o Denies  o : rash, itching  · Neurologic  o Denies  o : headache, weakness, loss of consciousness  · Musculoskeletal  o Denies  o : painful, swollen joints  · Psychiatric  o Denies  o : drug/alcohol addiction, anxiety, depression      Vitals  Date Time BP Position Site L\R Cuff Size HR RR TEMP (F) WT  HT  BMI kg/m2 BSA m2 O2 Sat FR L/min FiO2 HC       10/20/2020 11:07  "AM      64 - R   253lbs 16oz 5'  11\" 35.43 2.4 98 %            Physical Examination  · Constitutional  o Appearance  o : well developed, well-nourished, no obvious deformities present  · Head and Face  o Head  o :   § Inspection  § : normocephalic  o Face  o :   § Inspection  § : no facial lesions  · Eyes  o Conjunctivae  o : conjunctivae normal  o Sclerae  o : sclerae white  · Ears, Nose, Mouth and Throat  o Ears  o :   § External Ears  § : appearance within normal limits  § Hearing  § : intact  o Nose  o :   § External Nose  § : appearance normal  · Neck  o Inspection/Palpation  o : normal appearance  o Range of Motion  o : full range of motion  · Respiratory  o Respiratory Effort  o : breathing unlabored  o Inspection of Chest  o : normal appearance  o Auscultation of Lungs  o : no audible wheezing or rales  · Cardiovascular  o Heart  o : regular rate  · Gastrointestinal  o Abdominal Examination  o : soft and non-tender  · Skin and Subcutaneous Tissue  o General Inspection  o : intact, no rashes  · Psychiatric  o General  o : Alert and oriented x3  o Judgement and Insight  o : judgment and insight intact  o Mood and Affect  o : mood normal, affect appropriate  · Right Hip  o Inspection  o : Sensation grossly intact. Neurovascular intact. Pulses normal. Skin intact. Full weight-bearing. Good strength in quadriceps, hamstrings, dorsiflexors, and plantar flexors. Non-tender. Full flexion and extension. No pain with adduction and abduction of hips. No swelling, skin discoloration atrophy.   · Left Hip  o Inspection  o : Sensation grossly intact. Neurovascular intact. Pulses normal. Skin intact. Full weight-bearing. Good strength in quadriceps, hamstrings, dorsiflexors, and plantar flexors. Pain with flexion and extension. Pain with abduction and adduction. No swelling, skin discoloration or atrophy. Tender.   · Imaging  o Imaging  o : [CT PELVIS] 1. Moderate fat containing left inguinal hernia. 2. Advanced " degenerative changes in the right hip. Moderate degenerative changes in left hip. 3. Colonic diverticulosis.           Assessment  · Hip Bursitis     727.3/M71.9  · Bilateral Pain: Hip     719.45/M25.559      Plan  · Medications  o Medications have been Reconciled  o Transition of Care or Provider Policy  · Instructions  o Dr. Hawley saw and examined the patient and agrees with plan.   o CT reviewed by Dr. Hawley.  o Reviewed the patient's Past Medical, Social, and Family history as well as the ROS at today's visit, no changes.  o Call or return if worsening symptoms.  o Exercise handout given.  o Follow up in 1 month.  o This note was transcribed by Tegan Estrada.   o Discussed diagnosis and treatment options with the patient. Discussed injections, NSAIDs and at home exercises. Patient opted for at home exercises and NSAIDS.             Electronically Signed by: Tegan Estrada-, Other -Author on October 22, 2020 08:45:59 AM  Electronically Co-signed by: Ravinder Hawley MD -Reviewer on October 22, 2020 11:35:51 AM

## 2021-05-10 NOTE — H&P
"   History and Physical      Patient Name: Toñito Kuo   Patient ID: [id number]   Sex: Male   YOB: 1950    Primary Care Provider: Gurmeet Kerns DO   Referring Provider: Gurmeet Kerns DO    Visit Date: January 12, 2021    Provider: Belinda Bocanegra MD   Location: [facility]   Location Address: [address] [place] [address]  [id number]   Location Phone: [phone]          Chief Complaint  · Est care  · \"I have been having joint issues, hip, knee and my thumb been told it's my arthritis.\"      History Of Present Illness  Toñito Kuo is a 70 year old /White male who presents to establish care. Accompanied by his wife.      lft. Knee pain when going downstairs especialy during cold weather. right thumb with pain, affecting  strength.     Concern about renal function:  cardiologist and PCP concern about renal function in the past and would like to discuss further today.     RAUL:   Chronic and stable on CPAP    Hx of MI s/p CABG:   Follows with cardiology. He has no active needs or concerns today. Denies chest pain or SOA. He is active and likes being outdoors.     \">Previous PCP- Dr. Kerns  Last labs- 12/20/2020   Specialist- Cardiology,   Colonoscopy- 2017  Influenza vaccine 20/21-  Shingles vaccine- 12/12/2019  Pneumonia vaccine-2013  Hep A 12/12/2019 and 11/25/2020  Covid Vaccine - 1/8/2021  Tdap - 2012  Tobacco- Never used.     wants to talk about arthritis/joint pain.  has seen Dr. Hawley, with concern for right hip arthritis and left, rt >lft. Knee pain when going downstairs especialy during cold weather. right thumb with pain, affecting  strength.     Concern about renal function:  cardiologist and PCP concern about renal function in the past and would like to discuss further today.     RAUL:   Chronic and stable on CPAP    Hx of MI s/p CABG:   Follows with cardiology. He has no active needs or concerns today. Denies chest pain or " SOA. He is active and likes being outdoors.            Past Medical History  Disease Name Date Onset Notes   Bladder disease --  --    CAD (coronary artery disease) 11/09/2016 --    Cancer --  --    Chest pain --  --    Coronary atherosclerosis; of native coronary vessel 03/29/2015 --    Diabetes mellitus, type 2 --  --    GERD --  --    Hard of hearing --  --    Heart Attack --  --    High cholesterol --  --    History of coronary artery disease 04/18/2016 --    Hyperlipemia --  --    Hypertension --  --    MI (myocardial infarction) --  --    Seasonal allergies --  --    Sleep apnea over 10 years ago --          Past Surgical History  Procedure Name Date Notes   Appendectomy 1957 --    Colonoscopy --  diverticulosis   Heart Bypass 2/2011 --    Hernia --  --    Testicle Surgery 1974 --          Medication List  Name Date Started Instructions   aspirin 81 mg Oral tablet,delayed release (DR/EC)  take 1 tablet (81 mg) by oral route once daily   Cialis 10 mg oral tablet 06/18/2019 take 1 tablet (10 mg) by oral route as needed approximately 1 hour before sexual activity   Eliquis 5 mg oral tablet 01/04/2021 take 1 tablet (5 mg) by oral route 2 times per day for 90 days   metformin 500 mg oral tablet extended release 24 hr 12/03/2020 take 1 tablet (500 mg) by BID with the evening meal   metoprolol succinate 50 mg oral tablet extended release 24 hr 12/29/2020 TAKE 1 TABLET EVERY DAY   Nitrostat 0.4 mg sublingual tablet, sublingual 01/04/2021 place 1 tablet under tongue by translingual route 1X at onset of angina   One-A-Day Men's 50+ Advantage 400- mcg Oral tablet  take 1 tablet by oral route daily   rosuvastatin 40 mg oral tablet 01/18/2021 TAKE 1 TABLET EVERY DAY (INSTEAD OF ATORVASTATIN)   triamcinolone acetonide 0.1 % topical cream 01/12/2021 apply a thin layer to the affected area(s) by topical route 2 times per day   valsartan-hydrochlorothiazide 320-25 mg oral tablet 02/08/2021 take 1 tablet by oral route  once daily in the morning   Voltaren 1 % topical gel 01/14/2020 apply 4 grams to the affected area(s) by topical route 4 times per day         Allergy List  Allergen Name Date Reaction Notes   PENICILLINS --  --  --        Allergies Reconciled  Family Medical History  Disease Name Relative/Age Notes   Breast Neoplasm, Malignant Grandmother (paternal)/  Sister/   --    Cancer, Unspecified Mother/  Sister/   Mother; Sister   Colon Neoplasm Grandmother (maternal)/   --    Diabetes, unspecified type Grandmother (maternal)/   --    Lung cancer Grandfather (maternal)/   --    Melanoma Mother/   --          Social History  Finding Status Start/Stop Quantity Notes   Alcohol Current some day 21/-- --  --    lives with spouse --  --/-- --  --    . --  --/-- --  --    Recreational Drug Use Never --/-- --  no   Retired. --  --/-- --  --    Tobacco Never --/-- --  never smoker         Immunizations  NameDate Admin Mfg Trade Name Lot Number Route Inj VIS Given VIS Publication   Mfjhmfxdd61/25/2020 PMC FLUZONE-HIGH DOSE [id number]  RD 09/25/2020    Comments: pt tolerated well   Xfpjnkmhujbb74/20/2015 NE PREVNAR 13 [id number] IM LD 10/20/2015 10/19/2015   Comments: tolerated well   Ojljnvjbffmo98/20/2015 NE PREVNAR 13 [id number] IM LD 10/20/2015 10/19/2015   Comments: tolerated well   Ifqibylamxdi74/06/2013 MSD PNEUMOVAX 23 [id number] IM LD 11/06/2013 10/06/2009   Comments: tolerated well   Nerzxxfs47/30/2013 MSD ZOSTAVAX  SC NE 11/06/2013    Comments:    Td01/05/2006 PMC DECAVAC  IM NE 11/06/2013 01/24/2012   Comments:    Tdap04/12/2017 SKB BOOSTRIX [id number] IM RD 04/12/2017 01/24/2012   Comments: tolerated well         Review of Systems  · Constitutional  o Denies  o : fever, fatigue, weight loss, weight gain  · Cardiovascular  o Admits  o : chronic LE edema  o Denies  o : claudication, chest pressure, palpitations  · Respiratory  o Denies  o : shortness of breath, wheezing, cough, hemoptysis, dyspnea on  "exertion  · Gastrointestinal  o Denies  o : nausea, vomiting, diarrhea, constipation, abdominal pain  · Musculoskeletal  o * See HPI      Vitals  Date Time BP Position Site L\R Cuff Size HR RR TEMP (F) WT  HT  BMI kg/m2 BSA m2 O2 Sat FR L/min FiO2 HC       01/12/2021 01:40 /72 Sitting    60 - R  97.6 249lbs 5oz 6'  8\" 27.39 2.53 98 %  21%          Physical Examination  · Constitutional  o Appearance  o : no acute distress, well-nourished  · Head and Face  o Head  o :   § Inspection  § : atraumatic, normocephalic  · Ears, Nose, Mouth and Throat  o Ears  o :   § External Ears  § : normal  o Nose  o :   § Intranasal Exam  § : nares patent  o Oral Cavity  o :   § Oral Mucosa  § : moist mucous membranes  o Throat  o :   § Oropharynx  § : no inflammation or lesions present, tonsils within normal limits  · Neck  o Thyroid  o : gland size normal, nontender, no nodules or masses present on palpation, symmetric  · Respiratory  o Respiratory Effort  o : breathing comfortably, symmetric chest rise  o Auscultation of Lungs  o : clear to asculatation bilaterally, no wheezes, rales, or rhonchii  · Cardiovascular  o Heart  o :   § Auscultation of Heart  § : regular rate and rhythm, no murmurs, rubs, or gallops  o Peripheral Vascular System  o :   § Extremities  § : mild non-pitting edema of bilateral LE   · Gastrointestinal  o Abdominal Examination  o : abdomen nontender to palpation, normal bowel sounds, tone normal without rigidity or guarding,   · Lymphatic  o Neck  o : no lymphadenopathy present  · Neurologic  o Mental Status Examination  o :   § Orientation  § : grossly oriented to person, place and time  o Cranial Nerves  o : crainial nerves 2-12 grossly intact, eye movements within normal limits, shoulder shrug strength normal  o Gait and Station  o :   § Gait Screening  § : normal gait     MSK: Strength is 5 out of 5 with flexion and extension of bilateral upper and lower extremities.  Patellar reflexes are 2+. "           Assessment  · Screening for depression     V79.0/Z13.89  negative screen .  · Anemia     285.9/D64.9  Mild chronic anemia noted on prior lab results in the EMR. Denies any hx of hematochezia or melena. Repeat CBC with reticulocyte panel and iron studies.   · Diabetes mellitus, type 2     250.00/E11.9  Chronic and in good control with A1C of 6.6 % in Oct. In need of repeat labs for continued monitoring.  · CAD (coronary artery disease)     414.00/I25.10  Chronic and stable on appropriate therapy with ARB, BB and statin. He is also on Eliquis. Follows with cardiology.   · Sleep apnea     780.57/G47.30  Chronic and stable on CPAP  · Hypertension     401.9/I10  Chronic and stable. In good control on current regimen.   · Establishing care with new doctor, encounter for     V65.8/Z76.89  Presenting to establish care with new provider.  · Arthritis     716.90/M19.90  Chronic and stable. He followed with orthopedics in the past. No gross abnormalities noted on exam today. Previously on meloxicam which was helpful however this was discontinued by his cardiologist as he is on Eliquis. Will try Voltaren gel for pain relief. F/u in 3 mos if pain persist he may benefit from referral to physical therapy.     Problems Reconciled  Plan  · Orders  o ACO-18: Negative screen for clinical depression using a standardized tool () - V79.0/Z13.89 - 01/12/2021  o ACO-39: Current medications updated and reviewed (, 1159F) - 250.00/E11.9, 285.9/D64.9, V65.8/Z76.89, 716.90/M19.90 - 01/12/2021  o Diabetes 2 Panel (Urine Microalbumin, CMP, Lipid, A1c, ) [facility] (97055, 11419, 16123, 18811) - V79.0/Z13.89, 250.00/E11.9 - 01/12/2021  o Reticulocyte panel (47428) - 250.00/E11.9, 285.9/D64.9 - 01/12/2021  o Iron Profile (Iron, TIBC, and Transferrin) (IRONP) - 250.00/E11.9, 285.9/D64.9 - 01/12/2021  o Ferritin (53163) - 250.00/E11.9, 285.9/D64.9 - 01/12/2021  o B12 level (96091) - 250.00/E11.9, 285.9/D64.9 - 01/12/2021  o Folate level  (14943) - 250.00/E11.9, 285.9/D64.9 - 01/12/2021  · Medications  o Medications have been Reconciled  o Transition of Care or Provider Policy  · Instructions  o Depression Screen completed and scanned into the EMR under the designated folder within the patient's documents.  o Today's PHQ-9 result is _0__  o Patient is taking medications as prescribed and doing well.   o Patient was educated/instructed on their diagnosis, treatment and medications prior to discharge from the clinic today.  o Patient was instructed to exercise regularly.  o Call the office with any concerns or questions.  · Disposition  o Follow up in 3 months.            Electronically Signed by: Belinda Bocanegra MD -Author on February 11, 2021 11:39:12 AM

## 2021-05-12 NOTE — PROGRESS NOTES
Quick Note      Patient Name: Toñito Kuo   Patient ID: [id number]   Sex: Male   YOB: 1950    Primary Care Provider: Tereso Frias MD   Referring Provider: Gurmeet Kerns DO    Visit Date: April 28, 2020    Provider: Gurmeet Kerns DO   Location: [facility]   Location Address: [address] [place] [address], [address]  [place], [place]  [id number]   Location Phone: [phone]          History Of Present Illness  TELEHEALTH TELEPHONE VISIT  Chief Complaint: 6 MONTHS F/U   Toñito Kuo is a 69 year old /White male who is presenting for evaluation via telehealth telephone visit. Verbal consent obtained before beginning visit.   Provider spent 8 minutes with the patient during telehealth visit.   The following staff were present during this visit: NORA Pichardo D.O.   Past Medical History/Overview of Patient Symptoms     Patient is here on phone today for 6 months follow up. Patient with no problems to report currently. Patient states he has a slew of labs ordered by Cardiology that he plans to get done soon.    Patient no anginal symptoms currently. Patient feels well overall. Patient does not routinely check blood pressure.           Assessment  · Hyperlipidemia     272.4/E78.5  · Coronary artery disease involving native coronary artery of native heart without angina pectoris     414.01/I25.10  · Sleep apnea     780.57/G47.30  · Gastroesophageal reflux disease, esophagitis presence not specified     530.81/K21.9  · Hypertension     401.9/I10      Plan  · Orders  o Physican Telephone evaluation, 5-10 min (97461) - 401.9/I10, 780.57/G47.30, 414.01/I25.10, 272.4/E78.5 - 05/19/2020  · Medications  o Medications have been Reconciled  o Transition of Care or Provider Policy  · Instructions  o Recommended exercise program to assist with cholesterol, weight loss and overall health improvement.  o Advised that cheeses and other sources of dairy fats, animal fats, fast food, and the extras  (candy, pastries, pies, doughnuts and cookies) all contain LDL raising nutrients. Advised to increase fruits, vegetables, whole grains, and to monitor portion sizes.   o Plan Of Care: Coordinated, Educated, and Discussed with Patient  o Chronic conditions reviewed and taken into consideration for today's treatment plan.  o Patient instructed to seek medical attention urgently for new or worsening symptoms.  o Patient was educated/instructed on their diagnosis, treatment and medications prior to discharge from the clinic today.  o Patient is taking medications as prescribed and doing well.   o Take all medications as prescribed/directed.  o Patient instructed/educated on their diet and exercise program.  o Patient counseled to reduce calorie intake.  o Patient was instructed to exercise regularly.  o Call the office with any concerns or questions.  o Discussed Covid-19 precautions including, but not limited to, social distancing, avoid touching your face, and hand washing.   · Disposition  o Follow up in three months            Electronically Signed by: Gurmeet Kerns DO -Author on May 19, 2020 02:29:13 PM

## 2021-05-13 NOTE — PROGRESS NOTES
Progress Note      Patient Name: Toñito Kuo   Patient ID: [id number]   Sex: Male   YOB: 1950    Primary Care Provider: Tereso Frias MD   Referring Provider: Gurmeet Kerns DO    Visit Date: July 2, 2020    Provider: Antoinette Tobar MD   Location: [facility]   Location Address: [address] [place] [address], [facility]   [place]  [id number]   Location Phone: [phone]          Chief Complaint     Follow up on atrial arrhythmia and coronary artery disease.       History Of Present Illness  REFERRING PROVIDER: Gurmeet Kerns DO   Toñito Kuo is a 69 year old /White male who denies any chest pain or pressure. No palpitations, shortness of breath, swelling, dizziness, syncope, PND, or orthopnea. Cardiac-wise, he is feeling very well. He is due to have a hernia repair in the next week. He has lost about 8 pounds since his last visit.   PAST MEDICAL HISTORY: Coronary artery disease with previous bypass surgery x1 in 2011; Hyperlipidemia; Hypertension; Surgical closure of patent foramen ovale.   FAMILY HISTORY: Positive for diabetes mellitus. Negative for hypertension or heart disease.   PSYCHOSOCIAL HISTORY: Denies tobacco use. Moderate alcohol consumption. Denies mood changes or depression.   CURRENT MEDICATIONS: Losartan-hydrochlorothiazide 100-25 mg daily; rosuvastatin 40 mg daily; metoprolol ER 50 mg daily; Eliquis 5 mg b.i.d.; nitro p.r.n.; aspirin 81 mg daily; multivitamin daily. Dosage and frequency of the medication(s) reviewed with the patient.       Review of Systems  · Cardiovascular  o Denies  o : palpitations (fast, fluttering, or skipping beats), swelling (feet, ankles, hands), shortness of breath while walking or lying flat, chest pain or angina pectoris   · Respiratory  o Denies  o : chronic or frequent cough, asthma or wheezing      Vitals  Date Time BP Position Site L\R Cuff Size HR RR TEMP (F) WT  HT  BMI kg/m2 BSA m2 O2 Sat HC       07/02/2020  "11:32 /60 Sitting    68 - R   250lbs 0oz 5'  11\" 34.87 2.38           Physical Examination  · Constitutional  o Appearance  o : Awake, alert, in no acute distress, somewhat hard-of-hearing, obese male.  · Respiratory  o Respiratory  o : Good respiratory effort. Clear to percussion and auscultation.  · Cardiovascular  o Heart  o : PMI not well felt. Heart sounds are distant. S1, irregular. S2, normal. No S3. No S4. Negative systolic/diastolic murmur.   o Peripheral Vascular System  o :   § Extremities  § : Good femoral and pedal pulses. No pedal edema.  · Gastrointestinal  o Abdominal Examination  o : Soft. No tenderness or masses felt. No hepatosplenomegaly. Abdominal aorta is not palpable.  · EKG  o EKG  o : Performed in the office today.  o Indications  o : Atrial arrhythmia.  o Results  o : He is in sinus rhythm today at a rate of 66 and some APCs.  o Comparison  o : Different than his EKG of December when he was in atrial fib at a rate of 78.   · Labs  o Labs  o : Blood sugar 120. BUN 25, creatinine 1.24. Total cholesterol 125, triglycerides 87, HDL 51, LDL 87. Thyroid was normal.          Assessment     1.  Paroxysmal atrial fibrillation, currently in sinus.  2.  Coronary artery disease with previous bypass without angina.  3.  Hypertension, controlled.  4.  Hyperlipidemia, at goal.       Plan     1.  Continue current medications.    2.  He is cleared for surgery for next week and can hold his Eliquis for 3 days prior to his procedure.    3.  Follow up in 6 months with labs or earlier if needed.        Care plan was reviewed and collaborated by ANDRES Renteria, and Antoinette Tobar MD, [facility]  JF:PM:vm               Electronically Signed by: Esthela Vigil-, Other -Author on July 7, 2020 10:24:33 AM  Electronically Co-signed by: ANDRES Tom -Reviewer on July 9, 2020 11:34:55 AM  Electronically Co-signed by: Antoinette Tobar MD -Reviewer on July 11, 2020 07:18:16 PM  "

## 2021-05-13 NOTE — PROGRESS NOTES
Progress Note      Patient Name: Toñito Kuo   Patient ID: [id number]   Sex: Male   YOB: 1950    Primary Care Provider: Gurmeet Kerns DO   Referring Provider: Gurmeet Kerns DO    Visit Date: October 28, 2020    Provider: Gurmeet Kerns DO   Location: [facility]   Location Address: [address] [place] [address], [address]  [place], [place]  [id number]   Location Phone: [phone]          Chief Complaint  · 6 month f/u      History Of Present Illness  Toñito Kuo is a 70 year old /White male who presents for evaluation and treatment of:      Pt is here for 6 month f/u.    Pt states that he had Hernia surgery, since having it he been having pain or discomfort in his lower abdominal has US this afternoon. Pt also had CT done with Dr. Burris. Patient CT showed possible persistent left persistent inguinal hernia. Patient reports slight discomfort in his left lower groin. Patient states a few days ago it kept him awake at night. Patient took some pain medication one at night which has helped with the pain. Patient was able to sleep when able to do it.    Pt has some discomfort in his left hip was told by Dr. Hawley said the bursitis may cause the symptoms.         Past Medical History  Disease Name Date Onset Notes   CAD (coronary artery disease) 11/09/2016 --    Cancer --  --    Chest pain --  --    Coronary atherosclerosis; of native coronary vessel 03/29/2015 --    GERD --  --    Heart Attack --  --    High cholesterol --  --    History of coronary artery disease 04/18/2016 --    Hyperlipemia --  --    Hypertension --  --    Seasonal allergies --  --    Sleep apnea over 10 years ago --          Past Surgical History  Procedure Name Date Notes   Appendectomy 1957 --    Colonoscopy --  diverticulosis   Heart Bypass 2/2011 --    heart surgery --  --    Hernia --  --    Testicle Surgery 1974 --          Medication List  Name Date Started Instructions   aspirin 81 mg  Oral tablet,delayed release (DR/EC)  take 1 tablet (81 mg) by oral route once daily   Cialis 10 mg oral tablet 06/18/2019 take 1 tablet (10 mg) by oral route as needed approximately 1 hour before sexual activity   Eliquis 5 mg oral tablet 05/28/2020 take 1 tablet (5 mg) by oral route 2 times per day   losartan-hydrochlorothiazide 100-25 mg oral tablet 08/18/2020 TAKE 1 TABLET EVERY DAY   meloxicam 15 mg oral tablet 10/21/2020 take 1 tablet by oral route daily   metoprolol succinate 50 mg oral tablet extended release 24 hr 08/18/2020 TAKE 1 TABLET EVERY DAY   Nitrostat 0.4 mg sublingual tablet, sublingual 03/26/2015 place 1 tablet under tongue by translingual route 1X at onset of angina   Norco 5-325 mg oral tablet  take 1-2 tablets by oral route every 4 to 6 hours   One-A-Day Men's 50+ Advantage 400- mcg Oral tablet  take 1 tablet by oral route daily   rosuvastatin 40 mg oral tablet 05/28/2020 TAKE 1 TABLET EVERY DAY (INSTEAD OF ATORVASTATIN)   tramadol 50 mg oral tablet 10/20/2020 take 1 tablet (50 mg) by oral route every 6 hours as needed   Voltaren 1 % topical gel 01/14/2020 apply 4 grams to the affected area(s) by topical route 4 times per day         Allergy List  Allergen Name Date Reaction Notes   PENICILLINS --  --  --          Family Medical History  Disease Name Relative/Age Notes   Breast Neoplasm, Malignant Grandmother (paternal)/  Sister/   --    Cancer, Unspecified Mother/  Sister/   Mother; Sister   Colon Neoplasm Grandmother (maternal)/   --    Diabetes, unspecified type Grandmother (maternal)/   --    Lung cancer Grandfather (maternal)/   --    Melanoma Mother/   --          Social History  Finding Status Start/Stop Quantity Notes   Alcohol Current every day 21/-- 2 a day --    Alcohol Use --  --/-- --  04/28/2020 - drinks weekly, 1 drink per day, has been drinking for 31 or more years   Heavy Amount of Exercise (4 or more times weekly) Unknown --/-- --  Clinical Reconciliation Mar 19 2015  3:24PM:    lives with spouse --  --/-- --  --     Unknown --/-- --  Clinical Reconciliation Mar 19 2015 3:24PM:    . --  --/-- --  --    No known infection risk Unknown --/-- --  Clinical Reconciliation Mar 19 2015 3:24PM:    Recreational Drug Use Never --/-- --  no   Retired. --  --/-- --  --    Tobacco Never --/-- --  never smoker         Immunizations  NameDate Admin Mfg Trade Name Lot Number Route Inj VIS Given VIS Publication   Rsbttrdqn47/25/2020 PMC FLUZONE-HIGH DOSE [id number] IM RD 09/25/2020    Comments: pt tolerated well   Rdqixjhzecnu92/20/2015 NE PREVNAR 13 [id number] IM LD 10/20/2015 10/19/2015   Comments: tolerated well   Kknsqligiark68/20/2015 NE PREVNAR 13 [id number] IM LD 10/20/2015 10/19/2015   Comments: tolerated well   Jgmbzlgosgml58/06/2013 MSD PNEUMOVAX 23 [id number] IM LD 11/06/2013 10/06/2009   Comments: tolerated well   Txwcpctl47/30/2013 MSD ZOSTAVAX  SC NE 11/06/2013    Comments:    Td01/05/2006 PMC DECAVAC  IM NE 11/06/2013 01/24/2012   Comments:    Tdap04/12/2017 SKB BOOSTRIX [id number] IM RD 04/12/2017 01/24/2012   Comments: tolerated well         Review of Systems  · Constitutional  o Admits  o : weight loss  o Denies  o : fatigue, night sweats  · Eyes  o Denies  o : double vision, blurred vision  · HENT  o Denies  o : vertigo, recent head injury  · Cardiovascular  o Denies  o : chest pain, irregular heart beats  · Respiratory  o Denies  o : shortness of breath, productive cough  · Gastrointestinal  o Denies  o : nausea, vomiting  · Genitourinary  o Admits  o : groin pain  o Denies  o : urgency, frequency, dysuria, urinary retention, scrotal mass  · Integument  o Denies  o : hair growth change, new skin lesions  · Neurologic  o Denies  o : altered mental status, seizures  · Musculoskeletal  o Denies  o : joint swelling, limitation of motion  · Endocrine  o Denies  o : cold intolerance, heat intolerance  · Psychiatric  o Denies  o : anxiety, depression  · Heme-Lymph  o Denies  o : petechiae,  "lymph node enlargement or tenderness  · Allergic-Immunologic  o Denies  o : frequent illnesses      Vitals  Date Time BP Position Site L\R Cuff Size HR RR TEMP (F) WT  HT  BMI kg/m2 BSA m2 O2 Sat FR L/min FiO2 HC       10/20/2020 01:05 PM      70 - R   252lbs 16oz 6'   34.31 2.41       10/28/2020 10:43 /37 Sitting    53 - R  98.5 249lbs 8oz 5'  11\" 34.8 2.38 97 %  21%          Physical Examination  · Constitutional  o Appearance  o : alert, oriented, in no acute distress, well developed, well-nourished  · Eyes  o Vision  o : Conjuntivae: Normal, Sclerae white, Pupils: PERRL, Cornea: Clear, no lesions bilateral  · Ears, Nose, Mouth and Throat  o Ears  o : Ext. Ears: Normal shape, Non tender, EACs: Normal , Tragus intact bilaterally, Hearing: intact to conversational voice bilaterally  o Nose  o : No nasal discharge, Mucosa: normal, Septum: midline, Sinuses: Nontender  o Throat  o : Oropharynx: no inflmation or lesions, no purulence or drainage  · Neck  o Inspection/Palpation  o : Supple, no masses or tenderness, no deformities, Trachea: Midline, ROM: with in normal limits, no neck stiffness, no lymphadenopathy  o Thyroid  o : no thyomegaly, no palpabale masses   · Respiratory  o Auscultation of Lungs  o : normal breath sounds throughout, no wheeze, rhonchi, or crackles  · Cardiovascular  o Heart  o : Regular rate and rhythm, Normal S1,S2 , No cardiac murmers, No S3 or S4 gallop or rubs  · Gastrointestinal  o Abdominal Examination  o : abdomen soft, nontender, non distended, no rigidity, gaurding, rebound tenderness, no ventral hernias present  o Liver and spleen  o : no hepatomegaly present, liver nontender to palpation, spleen not palpable  · Skin and Subcutaneous Tissue  o General Inspection  o : no rashes on visible skin, normal skin color, warm and dry  o Digits and Nails  o : no clubbing, cyanosis, deformities or edema present, normal appearing nails  · Neurologic  o Mental Status Examination  o : alert " and oriented to time, place, and person. Gait and Station: normal gait, able to stand without difficulty. CN 2-12 grossly intact   · Psychiatric  o Judgment and Insight  o : judgment and insight intact  o Mood and Affect  o : normal mood and affect          Assessment  · Essential hypertension     401.9/I10  Think diastolic falsely low but overall this blood pressure is improved and patient is asymptomatic, continue current regiment. Check labs.  · Hyperlipidemia     272.4/E78.5  · Impaired fasting glucose     790.21/R73.01  Recheck A1C, was in diabetic range on last check but not on medicine. Did lose ten pounds and changed diet. Will see what this shows, if still elevated start medicine.  · Left inguinal hernia     550.90/K40.90  Patient recent CT showing possible reoccurrence of inguinal hernia unsure if this is what post surgical read looks like so unsure how to fully interpret. Patient to having ultrasound of groin region today. Patient following up with Urology next week so they will be able to make a better determination with regards to this.   · Coronary artery disease involving native coronary artery of native heart without angina pectoris     414.01/I25.10      Plan  · Orders  o ACO - Pt declines to or was not able to provide an Advance Care Plan or name a Surrogate Decision Maker (1124F) - - 10/28/2020  o ACO-39: Current medications updated and reviewed (1159F, ) - - 10/28/2020  o ACO-14: Influenza immunization administered or previously received [facility] () - - 10/28/2020  · Medications  o Medications have been Reconciled  o Transition of Care or Provider Policy  · Instructions  o Patient advised to monitor blood pressure (B/P) at home and journal readings. Patient informed that a B/P reading at home of more than 130/80 is considered hypertension. For readings greater rvwl800/90 or higher patient is advised to follow up in the office with readings for management. Patient advised to limit sodium  intake.  o Recommended exercise program to assist with cholesterol, weight loss and overall health improvement.  o Advised that cheeses and other sources of dairy fats, animal fats, fast food, and the extras (candy, pastries, pies, doughnuts and cookies) all contain LDL raising nutrients. Advised to increase fruits, vegetables, whole grains, and to monitor portion sizes.   o Patient was educated/instructed on their diagnosis, treatment and medications prior to discharge from the clinic today.  o Patient was instructed to exercise regularly.  o Patient instructed to seek medical attention urgently for new or worsening symptoms.  o Call the office with any concerns or questions.  o Minutes spent with patient including greater than 50% in Education/Counseling/Care Coordination.  o Time spent with the patient was minutes, more than 50% face to face.  o Chronic conditions reviewed and taken into consideration for today's treatment plan.  o Discussed Covid-19 precautions including, but not limited to, social distancing, avoid touching your face, and hand washing.   o Electronically Identified Patient Education Materials Provided Electronically  · Disposition  o Follow up in six months            Electronically Signed by: Gurmeet Kerns, DO -Author on October 28, 2020 11:17:50 AM

## 2021-05-13 NOTE — PROGRESS NOTES
Progress Note      Patient Name: Toñito Kuo   Patient ID: [id number]   Sex: Male   YOB: 1950    Primary Care Provider: Gurmeet Kerns DO   Referring Provider: Gurmeet Kerns DO    Visit Date: October 28, 2020    Provider: Gurmeet Kerns DO   Location: [facility]   Location Address: [address] [place] [address], [address]  [place], [place]  [id number]   Location Phone: [phone]          Chief Complaint  · Annual Wellness Exam      History Of Present Illness  The patient is a 70 year old /White male who has come to this office for his Annual Wellness Visit.   His Primary Care Provider is Gurmeet Kerns DO. His comprehensive Care Team list, including suppliers, has been updated on the Facesheet. His medical/family history, height, weight, BMI, and blood pressure have been reviewed and are in the chart. The Health Risk Assessment has been completed and scanned in the chart.   Medications are listed in the medication list.   The active problem list includes: CAD (coronary artery disease), Coronary atherosclerosis; of native coronary vessel, GERD, High cholesterol, History of coronary artery disease, Hyperlipemia, Hypertension, and Sleep apnea   The patient does not have a history of substance use.   Patient reports his diet is adequate.   The Mini-Cog has been administered and is scanned in chart. The results are negative. His cognitive function is without limitation.   A hearing loss screen was completed today and the result is negative.   Patient does not have any risk factors for depression. Patient completed the PHQ-9 today and it has been scanned in the chart. The total score is 1-4.   The Timed Up and Go screen was administered today and the result is negative.   The [place] Index of [place] in ADLs indicated full function (score of 6).   A Falls Risk Assessment has been completed, including a review of home fall hazards and medication review.   Overall,  "the patient's functional ability is noted by this provider to be within normal limits. His level of safety is noted to be within normal limits. His balance/gait is within normal limits. There have been no falls in the past year. Patient-specific home safety recommendations have been reviewed and a copy has been given to patient.   He denies issues with leaking urine.   There are no additional risk factors identified.   Living Will/Advanced Directive has not previously been completed.   Personalized health advice was given to the patient and a written health screening schedule was established; see Plan for details.       Vitals  Date Time BP Position Site L\R Cuff Size HR RR TEMP (F) WT  HT  BMI kg/m2 BSA m2 O2 Sat FR L/min FiO2 HC       10/28/2020 10:43 /37 Sitting    53 - R  98.5 249lbs 8oz 5'  11\" 34.8 2.38 97 %  21%              Assessment  · Screening for alcoholism     V79.1/Z13.89  · Screening for depression     V79.0/Z13.89  · Encounter for Medicare annual wellness exam     V70.0/Z00.00      Plan  · Orders  o Annual alcohol screening using the AUDIT-C tool, 15 minutes [facility] (26547, ) - V79.1/Z13.89 - 10/28/2020  o Negative alcohol screening () - V79.1/Z13.89 - 10/28/2020  o Annual depression screening using the PHQ-9 tool, 15 minutes (35403, ) - V79.0/Z13.89 - 10/28/2020  o ACO-18: Negative screen for clinical depression using a standardized tool () - V79.0/Z13.89 - 10/28/2020  o Falls Risk Assessment Completed (4168F) - V70.0/Z00.00 - 10/28/2020  o Brief hearing screening (written) [facility] () - V70.0/Z00.00 - 10/28/2020  o Annual Wellness Visit-includes a Personalized Prevention Plan of Service (PPS), SUBSEQUENT VISIT (Medicare) () - V70.0/Z00.00 - 10/28/2020  o ACO-13: Fall Risk Screening with no falls in past year or only one fall without injury in the past year (1101F) - V70.0/Z00.00 - 10/28/2020  o Presence or absence of urinary incontinence assessed (TEO) (8120F) - " V70.0/Z00.00 - 10/28/2020  o ACO - Pt declines to or was not able to provide an Advance Care Plan or name a Surrogate Decision Maker (1124F) - - 10/28/2020  o ACO-39: Current medications updated and reviewed (, 1159F) - - 10/28/2020  o Influenza immunization was not ordered or administered for reasons documented by clinician () - - 10/28/2020  · Instructions  o Audit-C Questionnaire completed and scanned into the EMR under the designated folder within the patient's documents.  o Audit-C score of 0-4 - Negative Screen - Brief Discussion  o Depression Screen completed and scanned into the EMR under the designated folder within the patient's documents.  o Today's PHQ-9 result is _0__  o Health Risk Assessment has been reviewed with the patient.  o Written health screening schedule for next 5-10 years was established with patient; information scanned in chart and given/mailed to patient.  o Fall prevention methods discussed and a copy of recommendations given/mailed to patient.            Electronically Signed by: Gurmeet Kerns, DO -Author on November 8, 2020 10:08:31 PM

## 2021-05-13 NOTE — PROGRESS NOTES
Progress Note      Patient Name: Toñito Kuo   Patient ID: [id number]   Sex: Male   YOB: 1950    Primary Care Provider: Tereso Frias MD   Referring Provider: Gurmeet Kerns DO    Visit Date: July 21, 2020    Provider: Justin Burris MD   Location: [facility]   Location Address: [address] [place] [address]  [id number]   Location Phone: [phone]          Chief Complaint  · Follow Up Office Visit      History Of Present Illness     Mr. Kuo came back for follow-up. He is doing well following a robotic bilateral inguinal hernia repair. He is without complaints today.       Past Medical History  CAD (coronary artery disease); Cancer; Chest pain; Coronary atherosclerosis; of native coronary vessel; GERD; Heart Attack; High cholesterol; History of coronary artery disease; Hyperlipemia; Hypertension; Seasonal allergies; Sleep apnea         Past Surgical History  Appendectomy; Colonoscopy; Heart Bypass; heart surgery; Hernia; Testicle Surgery         Medication List  aspirin 81 mg Oral tablet,delayed release (DR/EC); Cialis 10 mg oral tablet; Eliquis 5 mg oral tablet; Havrix (PF) 1,440 TAMAR unit/mL intramuscular syringe; losartan-hydrochlorothiazide 100-25 mg oral tablet; metoprolol succinate 50 mg oral tablet extended release 24 hr; Nitrostat 0.4 mg sublingual tablet, sublingual; One-A-Day Men's 50+ Advantage 400- mcg Oral tablet; rosuvastatin 40 mg oral tablet; Shingrix (PF) 50 mcg/0.5 mL intramuscular suspension for reconstitution; Voltaren 1 % topical gel         Allergy List  PENICILLINS         Family Medical History  Breast Neoplasm, Malignant; Cancer, Unspecified; Colon Neoplasm; Diabetes, unspecified type; Lung cancer; Melanoma         Social History  Alcohol (Current every day); Alcohol Use; Heavy Amount of Exercise (4 or more times weekly) (Unknown); lives with spouse;  (Unknown); .; No known infection risk (Unknown); Recreational Drug Use  (Never); Retired.; Tobacco (Never)         Review of Systems  · Cardiovascular  o Denies  o : chest pain, irregular heart beats, rapid heart rate, chest pain on exertion, shortness of breath, lower extremity swelling  · Respiratory  o Denies  o : shortness of breath, wheezing, cough, wheezing, chronic cough, coughing up blood  · Gastrointestinal  o Denies  o : nausea, vomiting, diarrhea, chronic abdominal pain, reflux symptoms      Physical Examination     Today on physical exam, his incisions are healing up really well. Both hernia repairs feel intact.           Assessment  · Postoperative Exam Following Surgery     V67.00       Doing well status post bilateral robotic inguinal hernia repair.       Plan  · Medications  o Medications have been Reconciled  o Transition of Care or Provider Policy  · Instructions  o Follow up as needed.            Electronically Signed by: Analilia Barrientos-, -Author on July 22, 2020 10:03:21 AM  Electronically Co-signed by: Jutsin Burris MD -Reviewer on July 27, 2020 02:34:32 PM

## 2021-05-13 NOTE — PROGRESS NOTES
Progress Note      Patient Name: Toñito Kuo   Patient ID: [id number]   Sex: Male   YOB: 1950    Primary Care Provider: Gurmeet Kerns DO   Referring Provider: Gurmeet Kerns DO    Visit Date: December 1, 2020    Provider: Gurmeet Kerns DO   Location: [facility]   Location Address: [address] [place] [address], [address]  [place], [place]  [id number]   Location Phone: [phone]          Chief Complaint  · New regiment for DM II  · start DM II medications      History Of Present Illness  Toñito Kuo is a 70 year old /White male who presents for evaluation and treatment of:      Pt is here for new DM II regiments and starting new medications. Patient and I discussed he was likely diabetic and borderline diabetic for over a  year looking at previous A1Cs completed with Dr. Frias. We discussed how that was negatively affecting his kidney function as it has been worsening. We discussed given the complication to his kidney function we should start metformin and get very serious about dieting and weight loss.     Patient blood pressure elevated today, we will keep a log.    Patient frustrated by multiple physician changes at this clinic. I apologized to them and am trying help him find another internist but as I explained those are less common than previously. Does not want midlevel or family medicine physician.       Review of Systems  · Constitutional  o Denies  o : fatigue, night sweats  · Eyes  o Denies  o : double vision, blurred vision  · HENT  o Denies  o : vertigo, recent head injury  · Cardiovascular  o Denies  o : chest pain, irregular heart beats  · Respiratory  o Denies  o : shortness of breath, productive cough  · Gastrointestinal  o Denies  o : nausea, vomiting  · Genitourinary  o Denies  o : dysuria, urinary retention  · Integument  o Denies  o : hair growth change, new skin lesions  · Neurologic  o Denies  o : altered mental status,  seizures  · Musculoskeletal  o Denies  o : joint swelling, limitation of motion  · Endocrine  o Denies  o : cold intolerance, heat intolerance  · Heme-Lymph  o Denies  o : petechiae, lymph node enlargement or tenderness  · Allergic-Immunologic  o Denies  o : frequent illnesses      Vitals  Date Time BP Position Site L\R Cuff Size HR RR TEMP (F) WT  HT  BMI kg/m2 BSA m2 O2 Sat FR L/min FiO2 HC       12/01/2020 02:03 /52 Sitting    64 - R   253lbs 8oz 6'   34.38 2.42 98 %  21%    12/01/2020 02:32 /60 Sitting                       Physical Examination  · Constitutional  o Appearance  o : alert, oriented, in no acute distress, well developed, well-nourished  · Eyes  o Vision  o : Conjuntivae: Normal, Sclerae white, Pupils: PERRL, Cornea: Clear, no lesions bilateral  · Ears, Nose, Mouth and Throat  o Ears  o : Ext. Ears: Normal shape, Non tender, EACs: Normal , Tragus intact bilaterally, Hearing: intact to conversational voice bilaterally  o Nose  o : No nasal discharge, Mucosa: normal, Septum: midline, Sinuses: Nontender  o Throat  o : Oropharynx: no inflammation or lesions, no purulence or drainage  · Neck  o Inspection/Palpation  o : Supple, no masses or tenderness, no deformities, Trachea: Midline, ROM: with in normal limits, no neck stiffness, no lymphadenopathy  o Thyroid  o : no thyromegaly, no palpabale masses  · Respiratory  o Auscultation of Lungs  o : normal breath sounds throughout, no wheeze, rhonchi, or crackles  · Cardiovascular  o Heart  o : Regular rate and rhythm, Normal S1,S2 , No cardiac murmers, No S3 or S4 gallop or rubs  · Gastrointestinal  o Abdominal Examination  o : abdomen soft, nontender, non distended, no rigidity, guarding, rebound tenderness, no ventral hernias present  o Liver and spleen  o : no hepatomegaly present, liver nontender to palpation, spleen not palpable  · Musculoskeletal  o General  o :   § General Musculoskeletal  § : No joint swelling or deformity., Muscle tone,  strength, and development grossly normal.  · Skin and Subcutaneous Tissue  o General Inspection  o : no rashes on visible skin, normal skin color, warm and dry  o Digits and Nails  o : no clubbing, cyanosis, deformities or edema present, normal appearing nails  · Neurologic  o Mental Status Examination  o : alert and oriented to time, place, and person. Gait and Station: normal gait, able to stand without difficulty. CN 2-12 grossly intact   · Psychiatric  o Judgement and Insight  o : judgment and insight intact  o Mood and Affect  o : normal mood and affect                Assessment  · Type 2 diabetes mellitus with diabetic nephropathy, without long-term current use of insulin       Type 2 diabetes mellitus with diabetic nephropathy     250.40/E11.21  Starting metformin, repeat A1C and urine microalbumin in three months. Complete foot exam at that time. Patient needs to have eye exam at beginning of year. Was last done in January 2020.    Check CMP in three months as well. Have to be careful with metformin given his current renal function.  · Essential hypertension     401.9/I10  Could be playing role in his kidney dysfunction. Keep log and call in results.      Plan  · Orders  o Diabetes 2 Panel (Urine Microalbumin, CMP, Lipid, A1c, ) [facility] (97604, 76250, 71205, 39309) - 250.40/E11.21 - 03/01/2021  o ACO-41: Dilated Diabetic eye exam completed this year and results in chart/reviewed (2022F) - - 12/01/2020  o ACO-39: Current medications updated and reviewed (, 1159F) - - 12/01/2020  o ACO-41: Dilated Diabetic eye exam completed this year and results in chart/reviewed (2022F) - - 12/01/2020  · Medications  o metformin 500 mg oral tablet extended release 24 hr   SIG: take 1 tablet (500 mg) by BID with the evening meal   DISP: (60) Tablet with 3 refills  Prescribed on 12/01/2020     o meloxicam 15 mg oral tablet   SIG: take 1 tablet by oral route daily   DISP: (30) Tablet with 1 refills  Discontinued on 12/01/2020      · Instructions  o Daily foot care. Avoid walking barefoot. Annual Dilated Eye Exam.  o Discussed with patient blood pressure monitoring, hemoglobin A1C levels need to be below 7.0, and LDL (Lipid) goals below 70.  o Patient advised to monitor blood pressure (B/P) at home and journal readings. Patient informed that a B/P reading at home of more than 130/80 is considered hypertension. For readings greater [id number]/90 or higher patient is advised to follow up in the office with readings for management. Patient advised to limit sodium intake.  o Patient was educated/instructed on their diagnosis, treatment and medications prior to discharge from the clinic today.  o Patient was instructed to exercise regularly.  o Call the office with any concerns or questions.  o Minutes spent with patient including greater than 50% in Education/Counseling/Care Coordination.  o Time spent with the patient was minutes, more than 50% face to face.  o Chronic conditions reviewed and taken into consideration for today's treatment plan.  o Discussed Covid-19 precautions including, but not limited to, social distancing, avoid touching your face, and hand washing.   · Disposition  o Follow up in three months            Electronically Signed by: Gurmeet Kerns, DO -Author on December 1, 2020 02:40:58 PM

## 2021-05-13 NOTE — PROGRESS NOTES
"   Progress Note      Patient Name: Toñito Kuo   Patient ID: [id number]   Sex: Male   YOB: 1950    Primary Care Provider: Gurmeet Kerns DO   Referring Provider: Gurmeet Kerns DO    Visit Date: December 8, 2020    Provider: Mónica Arguello PA-C   Location: [facility]   Location Address: [address] [place] [address]  [id number]   Location Phone: [phone]          Chief Complaint  · left hip pain      History Of Present Illness  Toñito Kuo is a 70 year old /White male who presents today to [facility].      right. Patient states mild discomfort in left groin and lateral side of the hip. Patient states taking Meloxicam 15mg that provides some relief of pain. Patient states history of left side inguinal hernia surgery.\">Patient is following up for bilateral hip pain left > right. Patient states mild discomfort in left groin and lateral side of the hip. Patient states taking Meloxicam 15mg that provides some relief of pain. Patient states history of left side inguinal hernia surgery.       Past Medical History  CAD (coronary artery disease); Cancer; Chest pain; Coronary atherosclerosis; of native coronary vessel; GERD; Heart Attack; High cholesterol; History of coronary artery disease; Hyperlipemia; Hypertension; Seasonal allergies; Sleep apnea         Past Surgical History  Appendectomy; Colonoscopy; Heart Bypass; heart surgery; Hernia; Testicle Surgery         Medication List  aspirin 81 mg Oral tablet,delayed release (DR/EC); Cialis 10 mg oral tablet; Eliquis 5 mg oral tablet; losartan-hydrochlorothiazide 100-25 mg oral tablet; meloxicam 15 mg oral tablet; metformin 500 mg oral tablet extended release 24 hr; metoprolol succinate 50 mg oral tablet extended release 24 hr; Nitrostat 0.4 mg sublingual tablet, sublingual; Norco 5-325 mg oral tablet; One-A-Day Men's 50+ Advantage 400- mcg Oral tablet; rosuvastatin 40 mg oral tablet; tramadol 50 mg " oral tablet; Voltaren 1 % topical gel         Allergy List  PENICILLINS         Family Medical History  Breast Neoplasm, Malignant; Cancer, Unspecified; Colon Neoplasm; Diabetes, unspecified type; Lung cancer; Melanoma         Social History  Alcohol (Current every day); Alcohol Use; Heavy Amount of Exercise (4 or more times weekly) (Unknown); lives with spouse;  (Unknown); .; No known infection risk (Unknown); Recreational Drug Use (Never); Retired.; Tobacco (Never)         Review of Systems  · Constitutional  o Denies  o : fever, chills, weight loss  · Cardiovascular  o Denies  o : chest pain, shortness of breath  · Gastrointestinal  o Denies  o : liver disease, heartburn, nausea, blood in stools  · Genitourinary  o Denies  o : painful urination, blood in urine  · Integument  o Denies  o : rash, itching  · Neurologic  o Denies  o : headache, weakness, loss of consciousness  · Musculoskeletal  o Denies  o : painful, swollen joints  · Psychiatric  o Denies  o : drug/alcohol addiction, anxiety, depression      Vitals  Date Time BP Position Site L\R Cuff Size HR RR TEMP (F) WT  HT  BMI kg/m2 BSA m2 O2 Sat FR L/min FiO2        12/08/2020 02:22 PM      62 - R   240lbs 0oz 6'   32.55 2.35 97 %            Physical Examination  · Constitutional  o Appearance  o : well developed, well-nourished, no obvious deformities present  · Head and Face  o Head  o :   § Inspection  § : normocephalic  o Face  o :   § Inspection  § : no facial lesions  · Eyes  o Conjunctivae  o : conjunctivae normal  o Sclerae  o : sclerae white  · Ears, Nose, Mouth and Throat  o Ears  o :   § External Ears  § : appearance within normal limits  § Hearing  § : intact  o Nose  o :   § External Nose  § : appearance normal  · Neck  o Inspection/Palpation  o : normal appearance  o Range of Motion  o : full range of motion  · Respiratory  o Respiratory Effort  o : breathing unlabored  o Inspection of Chest  o : normal  appearance  o Auscultation of Lungs  o : no audible wheezing or rales  · Cardiovascular  o Heart  o : regular rate  · Gastrointestinal  o Abdominal Examination  o : soft and non-tender  · Skin and Subcutaneous Tissue  o General Inspection  o : intact, no rashes  · Psychiatric  o General  o : Alert and oriented x3  o Judgement and Insight  o : judgment and insight intact  o Mood and Affect  o : mood normal, affect appropriate  · Left Hip-Street Exam  o Inspection  o : No scar  o Palpation  o : no tenderness of iliac crest, no tenderness of iliac tubercle, no tenderness of greater trochanter, no tenderness at pubic tubercle, no tenderness at PSIS, no tenderness at ASIS, no tenderness at ischial tuberosity, no tenderness at sacroiliac joint, no tnederness at femoral triangle nerve, no tenderness at sciatic nerve, no tenderness at hip and pelvic muscles  o ROM  o : normal extension (30), normal abduction (45-50), normal adduction, normal internal rotation, normal external rotation  o Special Tests  o : normal heel/toe walk, no pain with flexion, no pain with extension, no pain with rotation  o Neurologic Testing  o : Neurovascular and sensation intact          Assessment  · Primary osteoarthritis of hip     715.15/M16.10  · Trochanteric Bursitis     726.5/M70.60  · Left Pain: Hip     719.45/M25.559      Plan  · Medications  o Medications have been Reconciled  o Transition of Care or Provider Policy  · Instructions  o Reviewed the patient's Past Medical, Social, and Family history as well as the ROS at today's visit, no changes.  o Call or return if worsening symptoms.  o Exercise handout given.  o Follow Up PRN.  o Electronically Identified Patient Education Materials Provided Electronically     Prescribed Meloxicam 15mg one po daily #30 use as needed             Electronically Signed by: DANIELA ShieldsC -Author on December 8, 2020 02:59:58 PM

## 2021-05-13 NOTE — PROGRESS NOTES
Progress Note      Patient Name: Toñito Kuo   Patient ID: [id number]   Sex: Male   YOB: 1950    Primary Care Provider: Tereso Frias MD   Referring Provider: Gurmeet Kerns DO    Visit Date: September 25, 2020    Provider: Justin Burris MD   Location: [facility]   Location Address: [address] [place] [address]  [id number]   Location Phone: [phone]          Chief Complaint  · Follow Up Office Visit      History Of Present Illness     Mr. Kuo came back for follow-up. He is a very nice gentleman who had a robotic bilateral inguinal hernia repair back in July. He has been having a little bit of pain in his left groin and he wanted me to take a look at that. He is not really having any pain on the right side.       Past Medical History  CAD (coronary artery disease); Cancer; Chest pain; Coronary atherosclerosis; of native coronary vessel; GERD; Heart Attack; High cholesterol; History of coronary artery disease; Hyperlipemia; Hypertension; Seasonal allergies; Sleep apnea         Past Surgical History  Appendectomy; Colonoscopy; Heart Bypass; heart surgery; Hernia; Testicle Surgery         Medication List  aspirin 81 mg Oral tablet,delayed release (DR/EC); Cialis 10 mg oral tablet; Eliquis 5 mg oral tablet; Havrix (PF) 1,440 TAMAR unit/mL intramuscular syringe; losartan-hydrochlorothiazide 100-25 mg oral tablet; metoprolol succinate 50 mg oral tablet extended release 24 hr; Nitrostat 0.4 mg sublingual tablet, sublingual; One-A-Day Men's 50+ Advantage 400- mcg Oral tablet; rosuvastatin 40 mg oral tablet; Shingrix (PF) 50 mcg/0.5 mL intramuscular suspension for reconstitution; Voltaren 1 % topical gel         Allergy List  PENICILLINS         Family Medical History  Breast Neoplasm, Malignant; Cancer, Unspecified; Colon Neoplasm; Diabetes, unspecified type; Lung cancer; Melanoma         Social History  Alcohol (Current every day); Alcohol Use; Heavy Amount of  "Exercise (4 or more times weekly) (Unknown); lives with spouse;  (Unknown); .; No known infection risk (Unknown); Recreational Drug Use (Never); Retired.; Tobacco (Never)         Review of Systems  · Cardiovascular  o Denies  o : chest pain, irregular heart beats, rapid heart rate, chest pain on exertion, shortness of breath, lower extremity swelling  · Respiratory  o Denies  o : shortness of breath, wheezing, cough, wheezing, chronic cough, coughing up blood  · Gastrointestinal  o Denies  o : nausea, vomiting, diarrhea, chronic abdominal pain, reflux symptoms      Vitals  Date Time BP Position Site L\R Cuff Size HR RR TEMP (F) WT  HT  BMI kg/m2 BSA m2 O2 Sat HC       09/25/2020 10:45 AM       16  249lbs 0oz 5'  11\" 34.73 2.38           Physical Examination     Today on physical exam, I can't definitely feel a recurrent hernia.           Assessment  · Postoperative Exam Following Surgery     V67.00  · Left groin pain     789.04/R10.32       Persistent left groin pain following a robotic bilateral inguinal hernia repair. I can't definitely feel a hernia today on physical exam.       Plan  · Orders  o CT Pelvis without IV Contrast [facility]; no Oral Prep (20431) - V67.00, 789.04/R10.32 - 10/16/2020   Scheduled at [facility] at 10am.   · Medications  o Medications have been Reconciled  o Transition of Care or Provider Policy     We will set him up to have a CT scan of the pelvis and just make sure he does not have a recurrent hernia. I will see him back once that is done.             Electronically Signed by: Analilia Barrientos-, -Author on September 25, 2020 01:47:23 PM  Electronically Co-signed by: Justin Burris MD -Reviewer on October 2, 2020 12:34:58 PM  "

## 2021-05-13 NOTE — PROGRESS NOTES
Progress Note      Patient Name: Toñito Kuo   Patient ID: [id number]   Sex: Male   YOB: 1950    Primary Care Provider: Gurmeet Kerns DO   Referring Provider: Gurmeet Kerns DO    Visit Date: October 28, 2020    Provider: Gurmeet Kerns DO   Location: [facility] [place] [facility]   Location Address: [address] [place] [address], [address]  [place], [place]  [id number]   Location Phone: [phone]          Chief Complaint  · Annual Wellness Exam      History Of Present Illness  The patient is a 70 year old /White male who has come to this office for his Annual Wellness Visit.   His Primary Care Provider is Gurmeet Kerns DO. His comprehensive Care Team list, including suppliers, has been updated on the Facesheet. His medical/family history, height, weight, BMI, and blood pressure have been reviewed and are in the chart. The Health Risk Assessment has been completed and scanned in the chart.   Medications are listed in the medication list.   The active problem list includes: PROBLEM LIST   The patient HISTORY OF SUBSTANCE USE have a history of substance use.   Patient reports his diet is adequate.   The Mini-Cog has been administered and is scanned in chart. The results are negative. His cognitive function is without limitation.   A hearing loss screen was completed today and the result is negative.   Patient does not have any risk factors for depression. Patient completed the PHQ-9 today and it has been scanned in the chart. The total score is.   The GAIT SCREENING TOOL was administered today and the result is negative.   The [place] Index of [place] in ADLs indicated full function (score of 6).   A Falls Risk Assessment has been completed, including a review of home fall hazards and medication review.   Overall, the patient's functional ability is noted by this provider to be within normal limits. His level of safety is noted to be within normal limits. His balance/gait is  "within normal limits. There WHAT IS FALL ASSESSMENT in the past year. Patient-specific home safety recommendations have been reviewed and a copy has been given to patient.   He denies issues with leaking urine.   There are no additional risk factors identified.   Living Will/Advanced Directive STATUS OF ADVANCED DIRECTIVE.   Personalized health advice was given to the patient and a written health screening schedule was established; see Plan for details.       Vitals  Date Time BP Position Site L\R Cuff Size HR RR TEMP (F) WT  HT  BMI kg/m2 BSA m2 O2 Sat FR L/min FiO2 HC       10/28/2020 10:43 /37 Sitting    53 - R  98.5 249lbs 8oz 5'  11\" 34.8 2.38 97 %  21%              Assessment  · Encounter for Medicare annual wellness exam     V70.0/Z00.00      Plan  · Orders  o Falls Risk Assessment Completed (3288F) - V70.0/Z00.00 - 10/28/2020  o Brief hearing screening (written) [facility] () - V70.0/Z00.00 - 10/28/2020  o Presence or absence of urinary incontinence assessed (TEO) (1090F) - V70.0/Z00.00 - 10/28/2020  o ACO-39: Current medications updated and reviewed (, 1159F) - -   · Instructions  o Health Risk Assessment has been reviewed with the patient.  o Written health screening schedule for next 5-10 years was established with patient; information scanned in chart and given/mailed to patient.  o Fall prevention methods discussed and a copy of recommendations given/mailed to patient.            Electronically Signed by: Gurmeet Kerns DO -Author on October 28, 2020 11:52:58 AM  "

## 2021-05-14 VITALS
DIASTOLIC BLOOD PRESSURE: 72 MMHG | SYSTOLIC BLOOD PRESSURE: 130 MMHG | TEMPERATURE: 97.6 F | HEART RATE: 60 BPM | BODY MASS INDEX: 28.85 KG/M2 | WEIGHT: 249.31 LBS | HEIGHT: 78 IN | OXYGEN SATURATION: 98 %

## 2021-05-14 VITALS
HEIGHT: 71 IN | WEIGHT: 251 LBS | BODY MASS INDEX: 35.14 KG/M2 | DIASTOLIC BLOOD PRESSURE: 54 MMHG | HEART RATE: 54 BPM | SYSTOLIC BLOOD PRESSURE: 122 MMHG

## 2021-05-14 VITALS — RESPIRATION RATE: 16 BRPM | BODY MASS INDEX: 34.86 KG/M2 | HEIGHT: 71 IN | WEIGHT: 249 LBS

## 2021-05-14 VITALS
BODY MASS INDEX: 34.27 KG/M2 | BODY MASS INDEX: 35.56 KG/M2 | HEART RATE: 64 BPM | WEIGHT: 254 LBS | OXYGEN SATURATION: 98 % | HEART RATE: 70 BPM | WEIGHT: 253 LBS | HEIGHT: 71 IN | HEIGHT: 72 IN

## 2021-05-14 VITALS
DIASTOLIC BLOOD PRESSURE: 60 MMHG | OXYGEN SATURATION: 97 % | WEIGHT: 247 LBS | HEIGHT: 72 IN | SYSTOLIC BLOOD PRESSURE: 118 MMHG | TEMPERATURE: 98.9 F | BODY MASS INDEX: 33.46 KG/M2 | HEART RATE: 71 BPM

## 2021-05-14 VITALS
HEIGHT: 71 IN | OXYGEN SATURATION: 97 % | DIASTOLIC BLOOD PRESSURE: 37 MMHG | HEART RATE: 53 BPM | WEIGHT: 249.5 LBS | BODY MASS INDEX: 34.93 KG/M2 | SYSTOLIC BLOOD PRESSURE: 128 MMHG | TEMPERATURE: 98.5 F

## 2021-05-14 VITALS
BODY MASS INDEX: 33.66 KG/M2 | HEART RATE: 68 BPM | DIASTOLIC BLOOD PRESSURE: 80 MMHG | SYSTOLIC BLOOD PRESSURE: 132 MMHG | OXYGEN SATURATION: 97 % | TEMPERATURE: 97.7 F | HEIGHT: 72 IN | WEIGHT: 248.56 LBS

## 2021-05-14 VITALS
WEIGHT: 253.5 LBS | OXYGEN SATURATION: 98 % | BODY MASS INDEX: 34.34 KG/M2 | HEART RATE: 64 BPM | SYSTOLIC BLOOD PRESSURE: 155 MMHG | HEIGHT: 72 IN | DIASTOLIC BLOOD PRESSURE: 52 MMHG

## 2021-05-14 VITALS — BODY MASS INDEX: 32.51 KG/M2 | OXYGEN SATURATION: 97 % | HEART RATE: 62 BPM | WEIGHT: 240 LBS | HEIGHT: 72 IN

## 2021-05-14 NOTE — PROGRESS NOTES
"   Progress Note      Patient Name: Toñito Kuo   Patient ID: [id number]   Sex: Male   YOB: 1950    Primary Care Provider: Gurmeet Kerns DO   Referring Provider: Gurmeet Kerns DO    Visit Date: January 4, 2021    Provider: Antoinette Tobar MD   [place] [facility]   Location Address: [address] [place] [address], [facility]   [place]  [id number]   Location Phone: [phone]          Chief Complaint     Coronary artery disease.  Hypertension.  Hyperlipidemia.       History Of Present Illness  REFERRING PROVIDER: Gurmeet Kerns DO   Toñito Kuo is a 70 year old /White male with coronary artery disease with previous bypass surgery, hypertension, and hyperlipidemia who is doing well. He has mild swelling of his hands and feet intermittently. He denies chest pain, shortness of breath, paroxysmal nocturnal dyspnea, orthopnea, palpitations, dizziness, or syncope.   PAST MEDICAL HISTORY: Coronary artery disease with previous bypass surgery x1 in 2011; Hyperlipidemia; Hypertension; Surgical closure of patent foramen ovale.   PSYCHOSOCIAL HISTORY: Daily alcohol use.   CURRENT MEDICATIONS: Losartan-hydrochlorothiazide 100-25 mg daily; rosuvastatin 40 mg daily; metoprolol ER 50 mg daily; Eliquis 5 mg b.i.d.; metformin  mg b.i.d.; meloxicam 15 mg p.r.n.; nitroglycerin 0.4 mg p.r.n.; aspirin 81 mg every other day.      ALLERGIES:  PENICILLIN.       Review of Systems  · Cardiovascular  o Admits  o : swelling (feet, ankles, hands)  o Denies  o : palpitations (fast, fluttering, or skipping beats), shortness of breath while walking or lying flat, chest pain or angina pectoris   · Respiratory  o Denies  o : chronic or frequent cough      Vitals  Date Time BP Position Site L\R Cuff Size HR RR TEMP (F) WT  HT  BMI kg/m2 BSA m2 O2 Sat FR L/min FiO2 HC       01/04/2021 11:36 /54 Sitting    54 - R   250lbs 16oz 5'  11\" 35.01 2.39              His home blood pressure readings are " higher than I would like for them to be, but he admits that his blood pressure machine has not been very reliable and he is going to get a new BP instrument.       Physical Examination  · Constitutional  o Appearance  o : Awake, alert, in no acute distress, somewhat hard-of-hearing.  · Eyes  o Conjunctivae  o : Conjunctivae normal.  · Ears, Nose, Mouth and Throat  o Oral Cavity  o :   § Oral Mucosa  § : Normal.  · Neck  o Inspection/Palpation/Auscultation  o : No lymphadenopathy. No JVD. No bruit. Good carotid upstroke.  · Respiratory  o Respiratory  o : Clear to percussion and auscultation. Good respiratory effort.  · Cardiovascular  o Heart  o : PMI not well felt. Heart sounds are distant. S1, irregular. S2, normal. No S3. No S4. Negative systolic/diastolic murmur.   o Peripheral Vascular System  o :   § Extremities  § : Good femoral and pedal pulses. No pedal edema.  · Gastrointestinal  o Abdominal Examination  o : Soft. No masses or tenderness felt. No hepatosplenomegaly. Abdominal aorta is not palpable.  · EKG  o EKG  o : Performed in the office today.  o Indications  o : CAD.  o Results  o : Sinus rhythm. APCs. Nondiagnostic inferior ST elevation.   o Comparison  o : No change compared to previous EKG.   · Labs  o Labs  o : Hemoglobin is on the low side at 11.9. The hemoglobin is unchanged from 2 years ago. BUN 37, creatinine 1.6, which is slightly worse. HDL 53, LDL 57. Triglycerides 56.           Assessment     1.  Hypertension, uncertain control.  2.  Coronary artery disease without angina pectoris with previous bypass surgery.    3.  Hyperlipidemia, LDL at goal.  4.  Chronic kidney disease, stage 3, slightly worse.       Plan     1.  On reviewing his old imaging data, he has not an ultrasound of his kidneys.  I am going to order one.    2.  I have indicated to him that he should completely stop his meloxicam and discuss with Dr. Kerns regarding        alternative therapy.    3.  Two weeks of blood  pressure monitoring a week after discontinuing meloxicam.    4.  Repeat BMP in one month and office visit in 6 months.      Antoinette Tobar MD, FACC  PM:             Electronically Signed by: Antoinette Tobar MD -Author on January 7, 2021 05:53:16 PM

## 2021-05-14 NOTE — PROGRESS NOTES
"   Progress Note      Patient Name: Toñito Kuo   Patient ID: [id number]   Sex: Male   YOB: 1950    Primary Care Provider: Gurmeet Kerns DO   Referring Provider: Gurmeet Kerns DO    Visit Date: April 1, 2021    Provider: Belinda Bocanegra MD   Location: [facility]   Location Address: [address] [place] [address]  [id number]   Location Phone: [phone]          Chief Complaint  · Follow-up      History Of Present Illness  Toñito Kuo is a 70 year old /White male who presents for evaluation and treatment of:      140, avg og 130-135, evening 145.   Cardiologist changed losartan to valsartan.   Had blood work     Dentist yesterday-  Has 2 broken teeth, that he needs crowns for, as well decay of right lower tooth crown which causes discomfort but not pain. Right side of face feels like it's being affected-  Plans for crown replacement and new crown on 4/20.\">BP:  checking more frequenty for his cardiologist. checking 3 times and using average.   In the morning, typically lower than higher in the evening.   AM readings typicaly >140, avg og 130-135, evening 145.   Cardiologist changed losartan to valsartan.   Had blood work     Dentist yesterday-  Has 2 broken teeth, that he needs crowns for, as well decay of right lower tooth crown which causes discomfort but not pain. Right side of face feels like it's being affected-  Plans for crown replacement and new crown on 4/20.       Past Medical History  Disease Name Date Onset Notes   Bladder disease --  --    CAD (coronary artery disease) 11/09/2016 --    Cancer --  --    Chest pain --  --    Coronary atherosclerosis; of native coronary vessel 03/29/2015 --    Diabetes mellitus, type 2 --  --    GERD --  --    Hard of hearing --  --    Heart Attack --  --    High cholesterol --  --    History of coronary artery disease 04/18/2016 --    Hyperlipemia --  --    Hypertension --  --    MI (myocardial " infarction) --  --    Seasonal allergies --  --    Sleep apnea over 10 years ago --          Past Surgical History  Procedure Name Date Notes   Appendectomy 1957 --    Colonoscopy --  diverticulosis   Heart Bypass 2/2011 --    Hernia --  --    Testicle Surgery 1974 --          Medication List  Name Date Started Instructions   amlodipine 2.5 mg oral tablet 04/15/2021 take 1 tablet (2.5 mg) by oral route once daily   aspirin 81 mg Oral tablet,delayed release (DR/EC)  take 1 tablet (81 mg) by oral route once daily   Cialis 10 mg oral tablet 06/18/2019 take 1 tablet (10 mg) by oral route as needed approximately 1 hour before sexual activity   Eliquis 5 mg oral tablet 04/19/2021 take 1 tablet (5 mg) by oral route 2 times per day for 90 days   meloxicam 15 mg oral tablet  take 1 tablet (15 mg) by oral route once daily   metformin 500 mg oral tablet extended release 24 hr 04/01/2021 take 1 tablet (500 mg) by BID with the evening meal   metoprolol succinate 50 mg oral tablet extended release 24 hr 12/29/2020 TAKE 1 TABLET EVERY DAY   nitroglycerin 0.4 mg sublingual tablet, sublingual  place 1 tablet (0.4 mg) by buccal route at the first sign of an attack; no more than 3 tabs are recommended within a 15 minute period.   Nitrostat 0.4 mg sublingual tablet, sublingual 01/04/2021 place 1 tablet under tongue by translingual route 1X at onset of angina   One-A-Day Men's 50+ Advantage 400- mcg Oral tablet  take 1 tablet by oral route daily   rosuvastatin 40 mg oral tablet 01/18/2021 TAKE 1 TABLET EVERY DAY (INSTEAD OF ATORVASTATIN)   spironolactone 25 mg oral tablet 03/11/2021 take 1 tablet (25 mg) by oral route every other day   triamcinolone acetonide 0.1 % topical cream 01/12/2021 apply a thin layer to the affected area(s) by topical route 2 times per day   valsartan-hydrochlorothiazide 320-25 mg oral tablet 03/22/2021 take 1 tablet by oral route once daily in the morning   Voltaren 1 % topical gel 01/14/2020 apply 4  grams to the affected area(s) by topical route 4 times per day         Allergy List  Allergen Name Date Reaction Notes   PENICILLINS --  --  --        Allergies Reconciled  Family Medical History  Disease Name Relative/Age Notes   Breast Neoplasm, Malignant Grandmother (paternal)/  Sister/   --    Cancer, Unspecified Mother/  Sister/   Mother; Sister   Colon Neoplasm Grandmother (maternal)/   --    Diabetes, unspecified type Grandmother (maternal)/   --    Lung cancer Grandfather (maternal)/   --    Melanoma Mother/   --          Social History  Finding Status Start/Stop Quantity Notes   Alcohol Current some day 21/-- --  --    lives with spouse --  --/-- --  --    . --  --/-- --  --    Recreational Drug Use Never --/-- --  no   Retired. --  --/-- --  --    Tobacco Never --/-- --  never smoker         Immunizations  NameDate Admin Mfg Trade Name Lot Number Route Inj VIS Given VIS Publication   Feafdwanv89/25/2020 PMC FLUZONE-HIGH DOSE [id number] IM RD 09/25/2020    Comments: pt tolerated well   Hmujahaslbfp52/20/2015 NE PREVNAR 13 [id number] IM LD 10/20/2015 10/19/2015   Comments: tolerated well   Hmfraibrgxls18/20/2015 NE PREVNAR 13 [id number] IM LD 10/20/2015 10/19/2015   Comments: tolerated well   Viwywsrgssfg69/06/2013 MSD PNEUMOVAX 23 [id number] IM LD 11/06/2013 10/06/2009   Comments: tolerated well   Yijmbvkj37/30/2013 MSD ZOSTAVAX  SC NE 11/06/2013    Comments:    Td01/05/2006 PMC DECAVAC  IM NE 11/06/2013 01/24/2012   Comments:    Tdap04/12/2017 SKB BOOSTRIX [id number] IM RD 04/12/2017 01/24/2012   Comments: tolerated well         Review of Systems  · Constitutional  o Denies  o : fever, fatigue, weight loss, weight gain  · HENT  o * See HPI  · Cardiovascular  o Denies  o : lower extremity edema, claudication, chest pressure, palpitations  · Respiratory  o Denies  o : shortness of breath, wheezing, cough, hemoptysis, dyspnea on exertion  · Gastrointestinal  o Denies  o : nausea, vomiting, diarrhea, constipation,  abdominal pain      Vitals  Date Time BP Position Site L\R Cuff Size HR RR TEMP (F) WT  HT  BMI kg/m2 BSA m2 O2 Sat FR L/min FiO2 HC       04/01/2021 10:16 /80 Sitting    68 - R  97.7 248lbs 9oz 6'   33.71 2.39 97 %  21%          Physical Examination  · Constitutional  o Appearance  o : no acute distress, well-nourished  · Head and Face  o Head  o :   § Inspection  § : atraumatic, normocephalic  · Eyes  o Eyes  o : extraocular movements intact, no scleral icterus, no conjunctival injection  · Respiratory  o Respiratory Effort  o : breathing comfortably, symmetric chest rise  o Auscultation of Lungs  o : clear to asculatation bilaterally, no wheezes, rales, or rhonchii  · Cardiovascular  o Heart  o :   § Auscultation of Heart  § : regular rate and rhythm, no murmurs, rubs, or gallops  · Neurologic  o Mental Status Examination  o :   § Orientation  § : grossly oriented to person, place and time  o Gait and Station  o :   § Gait Screening  § : normal gait  · Psychiatric  o General  o : normal mood and affect              Assessment  · Hypertension     401.9/I10  Chronic and stable. Reviewed log with patient. Holding off making any changes at this time. Continue with BP log.   · Diabetes mellitus, type 2     250.00/E11.9  Chronic and stable. Reviewed lab work. Need metformin refilled.   · Tooth pain     525.9/K08.89  following with dentist.       Plan  · Orders  o ACO-39: Current medications updated and reviewed (, 1159F) - 250.00/E11.9, 525.9/K08.89, 401.9/I10 - 04/01/2021  · Medications  o metformin 500 mg oral tablet extended release 24 hr   SIG: take 1 tablet (500 mg) by BID with the evening meal   DISP: (180) Tablet with 3 refills  Adjusted on 04/01/2021     o Medications have been Reconciled  o Transition of Care or Provider Policy  · Instructions  o Continue blood sugar monitoring daily and record. Bring your log to office visits. Call the office for readings below 70 and above 250 or any  complications.  o Daily foot care. Avoid walking barefoot. Annual Dilated Eye Exam.  o Discussed with patient blood pressure monitoring, hemoglobin A1C levels need to be below 7.0, and LDL (Lipid) goals below 70.  o Patient was educated/instructed on their diagnosis, treatment and medications prior to discharge from the clinic today.  o Call the office with any concerns or questions.  · Disposition  o Follow up in 3 months.            Electronically Signed by: Belinda Bocanegra MD -Author on April 28, 2021 09:02:09 PM

## 2021-05-15 VITALS
BODY MASS INDEX: 36.96 KG/M2 | HEIGHT: 71 IN | HEART RATE: 60 BPM | SYSTOLIC BLOOD PRESSURE: 140 MMHG | OXYGEN SATURATION: 99 % | DIASTOLIC BLOOD PRESSURE: 55 MMHG | WEIGHT: 264 LBS

## 2021-05-15 VITALS — BODY MASS INDEX: 36.26 KG/M2 | HEIGHT: 71 IN | RESPIRATION RATE: 16 BRPM | WEIGHT: 259 LBS

## 2021-05-15 VITALS
HEART RATE: 72 BPM | DIASTOLIC BLOOD PRESSURE: 76 MMHG | OXYGEN SATURATION: 99 % | HEIGHT: 71 IN | WEIGHT: 259 LBS | SYSTOLIC BLOOD PRESSURE: 140 MMHG | BODY MASS INDEX: 36.26 KG/M2

## 2021-05-15 VITALS
DIASTOLIC BLOOD PRESSURE: 56 MMHG | HEIGHT: 71 IN | BODY MASS INDEX: 36.12 KG/M2 | HEART RATE: 52 BPM | WEIGHT: 258 LBS | SYSTOLIC BLOOD PRESSURE: 146 MMHG

## 2021-05-15 VITALS
DIASTOLIC BLOOD PRESSURE: 80 MMHG | HEART RATE: 60 BPM | HEIGHT: 71 IN | WEIGHT: 258 LBS | SYSTOLIC BLOOD PRESSURE: 134 MMHG | BODY MASS INDEX: 36.12 KG/M2

## 2021-05-15 VITALS
HEART RATE: 68 BPM | WEIGHT: 250 LBS | BODY MASS INDEX: 35 KG/M2 | HEIGHT: 71 IN | SYSTOLIC BLOOD PRESSURE: 130 MMHG | DIASTOLIC BLOOD PRESSURE: 60 MMHG

## 2021-05-15 VITALS — BODY MASS INDEX: 36.29 KG/M2 | HEIGHT: 71 IN | WEIGHT: 259.25 LBS | OXYGEN SATURATION: 96 % | HEART RATE: 69 BPM

## 2021-05-16 VITALS
HEIGHT: 71 IN | HEART RATE: 54 BPM | BODY MASS INDEX: 36.4 KG/M2 | WEIGHT: 260 LBS | SYSTOLIC BLOOD PRESSURE: 142 MMHG | DIASTOLIC BLOOD PRESSURE: 80 MMHG

## 2021-05-16 VITALS — HEIGHT: 71 IN | WEIGHT: 255 LBS | RESPIRATION RATE: 16 BRPM | BODY MASS INDEX: 35.7 KG/M2

## 2021-05-16 VITALS
HEIGHT: 71 IN | BODY MASS INDEX: 36.15 KG/M2 | HEART RATE: 54 BPM | WEIGHT: 258.25 LBS | DIASTOLIC BLOOD PRESSURE: 62 MMHG | SYSTOLIC BLOOD PRESSURE: 130 MMHG

## 2021-05-16 VITALS
WEIGHT: 259 LBS | BODY MASS INDEX: 36.26 KG/M2 | DIASTOLIC BLOOD PRESSURE: 72 MMHG | SYSTOLIC BLOOD PRESSURE: 138 MMHG | HEIGHT: 71 IN | HEART RATE: 76 BPM

## 2021-05-16 VITALS
HEART RATE: 56 BPM | BODY MASS INDEX: 34.95 KG/M2 | WEIGHT: 258 LBS | HEIGHT: 72 IN | DIASTOLIC BLOOD PRESSURE: 60 MMHG | SYSTOLIC BLOOD PRESSURE: 118 MMHG

## 2021-05-16 VITALS
DIASTOLIC BLOOD PRESSURE: 60 MMHG | HEART RATE: 56 BPM | WEIGHT: 255 LBS | HEIGHT: 71 IN | BODY MASS INDEX: 35.7 KG/M2 | SYSTOLIC BLOOD PRESSURE: 131 MMHG

## 2021-07-06 RX ORDER — AMLODIPINE BESYLATE 2.5 MG/1
TABLET ORAL
COMMUNITY
Start: 2021-04-15 | End: 2021-07-06 | Stop reason: SDUPTHER

## 2021-07-07 RX ORDER — AMLODIPINE BESYLATE 2.5 MG/1
2.5 TABLET ORAL DAILY
Qty: 90 TABLET | Refills: 1 | Status: SHIPPED | OUTPATIENT
Start: 2021-07-07 | End: 2021-07-19 | Stop reason: SDUPTHER

## 2021-07-19 RX ORDER — AMLODIPINE BESYLATE 2.5 MG/1
TABLET ORAL
Qty: 30 TABLET | OUTPATIENT
Start: 2021-07-19

## 2021-07-20 RX ORDER — AMLODIPINE BESYLATE 2.5 MG/1
TABLET ORAL
Qty: 30 TABLET | Refills: 0 | Status: SHIPPED | OUTPATIENT
Start: 2021-07-20 | End: 2021-11-15

## 2021-07-28 ENCOUNTER — TRANSCRIBE ORDERS (OUTPATIENT)
Dept: LAB | Facility: HOSPITAL | Age: 71
End: 2021-07-28

## 2021-07-28 ENCOUNTER — LAB (OUTPATIENT)
Dept: LAB | Facility: HOSPITAL | Age: 71
End: 2021-07-28

## 2021-07-28 ENCOUNTER — OFFICE VISIT (OUTPATIENT)
Dept: INTERNAL MEDICINE | Facility: CLINIC | Age: 71
End: 2021-07-28

## 2021-07-28 VITALS
HEART RATE: 64 BPM | WEIGHT: 250 LBS | HEIGHT: 72 IN | TEMPERATURE: 97.6 F | DIASTOLIC BLOOD PRESSURE: 80 MMHG | OXYGEN SATURATION: 98 % | SYSTOLIC BLOOD PRESSURE: 134 MMHG | BODY MASS INDEX: 33.86 KG/M2

## 2021-07-28 DIAGNOSIS — E03.9 HYPOTHYROIDISM, UNSPECIFIED TYPE: ICD-10-CM

## 2021-07-28 DIAGNOSIS — E78.5 HYPERLIPIDEMIA, UNSPECIFIED HYPERLIPIDEMIA TYPE: Primary | ICD-10-CM

## 2021-07-28 DIAGNOSIS — I51.9 MYXEDEMA HEART DISEASE: ICD-10-CM

## 2021-07-28 DIAGNOSIS — M25.551 PAIN OF RIGHT HIP JOINT: ICD-10-CM

## 2021-07-28 DIAGNOSIS — R60.0 BILATERAL LEG EDEMA: Primary | ICD-10-CM

## 2021-07-28 DIAGNOSIS — Z79.899 ENCOUNTER FOR LONG-TERM (CURRENT) USE OF OTHER MEDICATIONS: ICD-10-CM

## 2021-07-28 DIAGNOSIS — E03.9 MYXEDEMA HEART DISEASE: ICD-10-CM

## 2021-07-28 DIAGNOSIS — E78.5 HYPERLIPIDEMIA, UNSPECIFIED HYPERLIPIDEMIA TYPE: ICD-10-CM

## 2021-07-28 LAB
ALBUMIN SERPL-MCNC: 4.5 G/DL (ref 3.5–5.2)
ALBUMIN/GLOB SERPL: 1.5 G/DL
ALP SERPL-CCNC: 57 U/L (ref 39–117)
ALT SERPL W P-5'-P-CCNC: 13 U/L (ref 1–41)
ANION GAP SERPL CALCULATED.3IONS-SCNC: 11.1 MMOL/L (ref 5–15)
AST SERPL-CCNC: 19 U/L (ref 1–40)
BILIRUB SERPL-MCNC: 0.2 MG/DL (ref 0–1.2)
BUN SERPL-MCNC: 26 MG/DL (ref 8–23)
BUN/CREAT SERPL: 17.9 (ref 7–25)
CALCIUM SPEC-SCNC: 9.8 MG/DL (ref 8.6–10.5)
CHLORIDE SERPL-SCNC: 101 MMOL/L (ref 98–107)
CHOLEST SERPL-MCNC: 127 MG/DL (ref 0–200)
CO2 SERPL-SCNC: 25.9 MMOL/L (ref 22–29)
CREAT SERPL-MCNC: 1.45 MG/DL (ref 0.76–1.27)
GFR SERPL CREATININE-BSD FRML MDRD: 48 ML/MIN/1.73
GLOBULIN UR ELPH-MCNC: 3.1 GM/DL
GLUCOSE SERPL-MCNC: 117 MG/DL (ref 65–99)
HDLC SERPL-MCNC: 45 MG/DL (ref 40–60)
LDLC SERPL CALC-MCNC: 69 MG/DL (ref 0–100)
LDLC/HDLC SERPL: 1.54 {RATIO}
POTASSIUM SERPL-SCNC: 4.8 MMOL/L (ref 3.5–5.2)
PROT SERPL-MCNC: 7.6 G/DL (ref 6–8.5)
SODIUM SERPL-SCNC: 138 MMOL/L (ref 136–145)
T4 FREE SERPL-MCNC: 1.21 NG/DL (ref 0.93–1.7)
TRIGL SERPL-MCNC: 64 MG/DL (ref 0–150)
TSH SERPL DL<=0.05 MIU/L-ACNC: 2.46 UIU/ML (ref 0.27–4.2)
VLDLC SERPL-MCNC: 13 MG/DL (ref 5–40)

## 2021-07-28 PROCEDURE — 36415 COLL VENOUS BLD VENIPUNCTURE: CPT

## 2021-07-28 PROCEDURE — 84443 ASSAY THYROID STIM HORMONE: CPT

## 2021-07-28 PROCEDURE — 84439 ASSAY OF FREE THYROXINE: CPT

## 2021-07-28 PROCEDURE — 99213 OFFICE O/P EST LOW 20 MIN: CPT | Performed by: STUDENT IN AN ORGANIZED HEALTH CARE EDUCATION/TRAINING PROGRAM

## 2021-07-28 PROCEDURE — 80053 COMPREHEN METABOLIC PANEL: CPT

## 2021-07-28 PROCEDURE — 80061 LIPID PANEL: CPT

## 2021-07-28 RX ORDER — MELOXICAM 15 MG/1
TABLET ORAL
COMMUNITY
End: 2021-08-11

## 2021-07-28 RX ORDER — VALSARTAN AND HYDROCHLOROTHIAZIDE 320; 25 MG/1; MG/1
TABLET, FILM COATED ORAL
COMMUNITY
Start: 2021-03-22 | End: 2021-11-08

## 2021-07-28 RX ORDER — TRIAMCINOLONE ACETONIDE 1 MG/G
CREAM TOPICAL 2 TIMES DAILY PRN
COMMUNITY
Start: 2021-01-12

## 2021-07-28 RX ORDER — METFORMIN HYDROCHLORIDE 500 MG/1
TABLET, EXTENDED RELEASE ORAL 2 TIMES DAILY
COMMUNITY
Start: 2021-06-09 | End: 2021-12-22

## 2021-07-28 RX ORDER — MULTIPLE VITAMINS W/ MINERALS TAB 9MG-400MCG
1 TAB ORAL DAILY
COMMUNITY

## 2021-07-28 RX ORDER — NITROGLYCERIN 0.4 MG/1
0.4 TABLET SUBLINGUAL
COMMUNITY

## 2021-07-28 RX ORDER — APIXABAN 5 MG/1
5 TABLET, FILM COATED ORAL EVERY 12 HOURS SCHEDULED
COMMUNITY
Start: 2021-06-29 | End: 2022-02-09

## 2021-07-28 RX ORDER — ASPIRIN 81 MG/1
81 TABLET ORAL EVERY OTHER DAY
COMMUNITY
End: 2021-12-16 | Stop reason: SINTOL

## 2021-07-28 RX ORDER — METOPROLOL SUCCINATE 50 MG/1
TABLET, EXTENDED RELEASE ORAL
COMMUNITY
Start: 2021-05-26 | End: 2022-02-23

## 2021-07-28 RX ORDER — SPIRONOLACTONE 25 MG/1
25 TABLET ORAL EVERY OTHER DAY
COMMUNITY
Start: 2021-05-25 | End: 2022-04-09 | Stop reason: DRUGHIGH

## 2021-07-28 RX ORDER — ROSUVASTATIN CALCIUM 40 MG/1
40 TABLET, COATED ORAL DAILY
COMMUNITY
Start: 2021-06-08 | End: 2021-11-19

## 2021-08-10 PROBLEM — E78.5 HYPERLIPEMIA: Status: ACTIVE | Noted: 2021-08-10

## 2021-08-10 PROBLEM — I10 HYPERTENSION: Status: ACTIVE | Noted: 2021-08-10

## 2021-08-11 ENCOUNTER — OFFICE VISIT (OUTPATIENT)
Dept: CARDIOLOGY | Facility: CLINIC | Age: 71
End: 2021-08-11

## 2021-08-11 VITALS
HEART RATE: 64 BPM | WEIGHT: 248 LBS | HEIGHT: 72 IN | DIASTOLIC BLOOD PRESSURE: 56 MMHG | BODY MASS INDEX: 33.59 KG/M2 | SYSTOLIC BLOOD PRESSURE: 134 MMHG

## 2021-08-11 DIAGNOSIS — E78.5 HYPERLIPIDEMIA, UNSPECIFIED HYPERLIPIDEMIA TYPE: ICD-10-CM

## 2021-08-11 DIAGNOSIS — R60.0 PEDAL EDEMA: ICD-10-CM

## 2021-08-11 DIAGNOSIS — N18.30 STAGE 3 CHRONIC KIDNEY DISEASE, UNSPECIFIED WHETHER STAGE 3A OR 3B CKD (HCC): ICD-10-CM

## 2021-08-11 DIAGNOSIS — I48.0 PAROXYSMAL ATRIAL FIBRILLATION (HCC): ICD-10-CM

## 2021-08-11 DIAGNOSIS — I10 ESSENTIAL HYPERTENSION: ICD-10-CM

## 2021-08-11 DIAGNOSIS — I25.810 ATHEROSCLEROSIS OF CORONARY ARTERY BYPASS GRAFT OF NATIVE HEART WITHOUT ANGINA PECTORIS: Primary | ICD-10-CM

## 2021-08-11 PROBLEM — N18.31 STAGE 3A CHRONIC KIDNEY DISEASE: Chronic | Status: ACTIVE | Noted: 2021-08-11

## 2021-08-11 PROCEDURE — 99214 OFFICE O/P EST MOD 30 MIN: CPT | Performed by: NURSE PRACTITIONER

## 2021-08-11 NOTE — PROGRESS NOTES
Chief Complaint  Follow-up, Atherosclerosis of coronary artery, Hyperlipidemia, and Hypertension    Subjective            Toñito Kuo presents to [facility]  He is a 70-year-old white male comes in occasional chest pain describes very brief he is never had to take any nitro for it.  That is unchanged for him.  Has occasional dizziness when he bends over but only has had a few seconds is also unchanged for him.  Does complain of and some increased pedal edema lately.  Denies shortness of breath, palpitations, syncope, PND, and orthopnea.  He did stop taking the meloxicam he does not take any other NSAIDs.              Past History:    Past Medical History:   Diagnosis Date   • Bladder disease    • CAD (coronary artery disease) 11/09/2016    hx    • Cancer (CMS/[facility])    • Chest pain    • Coronary atherosclerosis of native coronary vessel 03/29/2015   • Diabetes mellitus (CMS/[facility])     type 2   • GERD (gastroesophageal reflux disease)    • Hard of hearing    • Heart attack (CMS/[facility])    • High cholesterol    • HTN (hypertension)    • Hyperlipemia    • Myocardial infarction (CMS/[facility])    • Paroxysmal atrial fibrillation (CMS/[facility]) 8/11/2021   • Seasonal allergies    • Sleep apnea     over 10 yrs ago   • Stage 3a chronic kidney disease (CMS/[facility]) 8/11/2021        Family History: family history includes Breast cancer in his paternal grandmother and sister; Colon cancer in his maternal grandmother; Diabetes in his maternal grandmother; Lung cancer in his maternal grandfather; Melanoma in his mother.     Social History: reports that he has never smoked. He has never used smokeless tobacco. He reports current alcohol use. He reports that he does not use drugs.    Allergies: Penicillins      Past Surgical History:   Procedure Laterality Date   • APPENDECTOMY      1957   • CARDIAC SURGERY      bypass; 2/2011   • COLONOSCOPY      diverticulosis   • CORONARY ARTERY BYPASS GRAFT     • HERNIA REPAIR     •  TESTICLE SURGERY  1974        Prior to Admission medications    Medication Sig Start Date End Date Taking? Authorizing Provider   amLODIPine (NORVASC) 2.5 MG tablet TAKE 1 TABLET(2.5 MG) BY MOUTH EVERY DAY 7/20/21  Yes CharlesTita June, APRN   aspirin (aspirin) 81 MG EC tablet Take 81 mg by mouth Every Other Day.   Yes Donovan Ayala MD   Eliquis 5 MG tablet tablet Take 5 mg by mouth Every 12 (Twelve) Hours. 6/29/21  Yes Donovan Ayala MD   metFORMIN ER (GLUCOPHAGE-XR) 500 MG 24 hr tablet 2 (two) times a day. 6/9/21  Yes Donovan Ayala MD   metoprolol succinate XL (TOPROL-XL) 50 MG 24 hr tablet metoprolol succinate 50 mg oral tablet extended release 24 hr TAKE 1 TABLET EVERY DAY 5/26/2021  Active 5/26/21  Yes Donovan Ayala MD   multivitamin with minerals (ONE-A-DAY MENS 50+ PO) One-A-Day Men's 50 Plus 400- mcg oral tablet take 1 tablet by oral route daily   Active   Yes Donovan Ayala MD   nitroglycerin (NITROSTAT) 0.4 MG SL tablet nitroglycerin 0.4 mg sublingual tablet, sublingual place 1 tablet (0.4 mg) by buccal route at the first sign of an attack; no more than 3 tabs are recommended within a 15 minute period.   Active   Yes Donovna Ayala MD   rosuvastatin (CRESTOR) 40 MG tablet Take 40 mg by mouth Daily. 6/8/21  Yes Donovan Ayala MD   spironolactone (ALDACTONE) 25 MG tablet Take 25 mg by mouth Every Other Day. 5/25/21  Yes Donovan Ayala MD   triamcinolone (KENALOG) 0.1 % cream 2 (Two) Times a Day As Needed. 1/12/21  Yes Donovan Ayala MD   valsartan-hydrochlorothiazide (DIOVAN-HCT) 320-25 MG per tablet valsartan-hydrochlorothiazide 320-25 mg oral tablet take 1 tablet by oral route once daily in the morning 3/22/2021  Active 3/22/21  Yes Donovan Ayala MD   meloxicam (MOBIC) 15 MG tablet meloxicam 15 mg oral tablet take 1 tablet (15 mg) by oral route once daily   Active    ProviderDonovan MD        Review of Systems  "  Cardiovascular: Positive for chest pain (\"on rare occasions\" and unchanged).   Neurological: Positive for dizziness (When bending over) and light-headedness (When bending over).   All other systems reviewed and are negative.       Objective     Physical Exam  Constitutional:       Appearance: Normal appearance. He is obese.   Eyes:      Pupils: Pupils are equal, round, and reactive to light.   Neck:      Vascular: No carotid bruit.   Cardiovascular:      Rate and Rhythm: Normal rate and regular rhythm.      Chest Wall: PMI is displaced.      Pulses: Normal pulses.      Heart sounds: S1 normal and S2 normal. Heart sounds are distant.    No systolic murmur is present.   No diastolic murmur is present.   No S3 or S4 sounds.    Musculoskeletal:      Right lower le+ Edema present.      Left lower le+ Edema present.   Neurological:      Mental Status: He is alert.       /56   Pulse 64   Ht 182.9 cm (72\")   Wt 112 kg (248 lb)   BMI 33.63 kg/m²       Vitals:    21 1137   BP: 134/56   Pulse: 64       Result Review :         The following data was reviewed by: ANDRES Khan on 2021:      No results found for: PROBNP, BNP  CMP    CMP 2/3/21 3/31/21 7/28/21   Glucose   117 (A)   Glucose 93 105 (A)    BUN 28 (A) 26 (A) 26 (A)   Creatinine 1.12 1.32 (A) 1.45 (A)   eGFR Non  Am   48 (A)   Sodium 140 140 138   Potassium 4.6 4.4 4.8   Chloride 103 104 101   Calcium 9.6 9.6 9.8   Albumin   4.50   Total Bilirubin   0.2   Alkaline Phosphatase   57   AST (SGOT)   19   ALT (SGPT)   13   (A) Abnormal value            CBC w/diff    CBC w/Diff 10/28/20 12/18/20 1/12/21   WBC 8.70 7.61 7.39   RBC 4.01 (A) 4.25 (A) 4.22 (A)   Hemoglobin 11.3 (A) 11.9 (A)    Hematocrit 36.8 (A) 38.1 (A)    MCV 91.8 89.6    MCH 28.2 28.0    MCHC 30.7 (A) 31.2 (A)    RDW 14.1 14.6 (A)    Platelets 339 305    Neutrophil Rel % 71.5 67.7    Lymphocyte Rel % 13.6 (A) 16.4 (A)    Monocyte Rel % 9.9 10.2 (A)  "   Eosinophil Rel % 4.0 4.3    Basophil Rel % 0.8 1.1    (A) Abnormal value             Lipid Panel    Lipid Panel 12/18/20 1/12/21 7/28/21   Total Cholesterol   127   Total Cholesterol 121 219 (A)    Triglycerides 56 62 64   HDL Cholesterol 53 98 (A) 45   VLDL Cholesterol 11 12 13   LDL Cholesterol  57 (A) 109 (A) 69   LDL/HDL Ratio   1.54   (A) Abnormal value       Comments are available for some flowsheets but are not being displayed.            Lab Results   Component Value Date    TSH 2.460 07/28/2021    TSH 2.120 06/01/2020      Lab Results   Component Value Date    FREET4 1.21 07/28/2021    FREET4 1.1 06/01/2020      No results found for: DDIMERQUANT  No results found for: MG   No results found for: DIGOXIN   A1C Last 3 Results    HGBA1C Last 3 Results 10/28/20 1/12/21   Hemoglobin A1C 6.6 (A) 6.2 (A)   (A) Abnormal value       Comments are available for some flowsheets but are not being displayed.                                Assessment and Plan        Diagnoses and all orders for this visit:    1. Atherosclerosis of coronary artery bypass graft of native heart without angina pectoris (Primary)  Assessment & Plan:  With stable angina.  Continue aspirin.      2. Hyperlipidemia, unspecified hyperlipidemia type  Assessment & Plan:  Controlled.  Continue rosuvastatin 40 mg a day.    Orders:  -     Lipid Panel; Future  -     Comprehensive Metabolic Panel; Future    3. Essential hypertension  Assessment & Plan:  Controlled.  Continue valsartan /25 mg a day, spironolactone 25 every other day, metoprolol 50 mg, and amlodipine 2.5.    Orders:  -     Magnesium; Future    4. Stage 3 chronic kidney disease, unspecified whether stage 3a or 3b CKD (CMS/HCC)  -     Basic Metabolic Panel; Future    5. Paroxysmal atrial fibrillation (CMS/[facility])  Assessment & Plan:  Currently in sinus.  Continue Eliquis.  We will check EKG at next office visit.    Orders:  -     CBC & Differential; Future    6. Pedal edema  Assessment &  Plan:  Instructed to take his spironolactone 25 mg every day for the next 4 days and then back to his regular dosage.  Can increase to every day for few days as needed for increased pedal edema.               Follow Up     Return in about 5 months (around 1/11/2022) for with Dr. Tobar, EKG on next visit.    Patient was given instructions and counseling regarding his condition or for health maintenance advice. Please see specific information pulled into the AVS if appropriate.       ANDRES Renteria  08/11/21 11:42 EDT

## 2021-08-11 NOTE — ASSESSMENT & PLAN NOTE
Instructed to take his spironolactone 25 mg every day for the next 4 days and then back to his regular dosage.  Can increase to every day for few days as needed for increased pedal edema.

## 2021-08-11 NOTE — ASSESSMENT & PLAN NOTE
Slightly worse.  We will repeat a BMP in 3 weeks and if worse will suggest a nephrology consult.  We will leave that decision up to PCP.  Renal ultrasound from January 21, 2021 was reviewed

## 2021-08-11 NOTE — ASSESSMENT & PLAN NOTE
Controlled.  Continue valsartan /25 mg a day, spironolactone 25 every other day, metoprolol 50 mg, and amlodipine 2.5.

## 2021-08-25 ENCOUNTER — LAB (OUTPATIENT)
Dept: LAB | Facility: HOSPITAL | Age: 71
End: 2021-08-25

## 2021-08-25 DIAGNOSIS — N18.30 STAGE 3 CHRONIC KIDNEY DISEASE, UNSPECIFIED WHETHER STAGE 3A OR 3B CKD (HCC): ICD-10-CM

## 2021-08-25 LAB
ANION GAP SERPL CALCULATED.3IONS-SCNC: 8 MMOL/L (ref 5–15)
BUN SERPL-MCNC: 28 MG/DL (ref 8–23)
BUN/CREAT SERPL: 18.1 (ref 7–25)
CALCIUM SPEC-SCNC: 9.6 MG/DL (ref 8.6–10.5)
CHLORIDE SERPL-SCNC: 104 MMOL/L (ref 98–107)
CO2 SERPL-SCNC: 27 MMOL/L (ref 22–29)
CREAT SERPL-MCNC: 1.55 MG/DL (ref 0.76–1.27)
GFR SERPL CREATININE-BSD FRML MDRD: 44 ML/MIN/1.73
GLUCOSE SERPL-MCNC: 94 MG/DL (ref 65–99)
POTASSIUM SERPL-SCNC: 4.8 MMOL/L (ref 3.5–5.2)
SODIUM SERPL-SCNC: 139 MMOL/L (ref 136–145)

## 2021-08-25 PROCEDURE — 36415 COLL VENOUS BLD VENIPUNCTURE: CPT

## 2021-08-25 PROCEDURE — 80048 BASIC METABOLIC PNL TOTAL CA: CPT

## 2021-08-26 ENCOUNTER — TELEPHONE (OUTPATIENT)
Dept: CARDIOLOGY | Facility: CLINIC | Age: 71
End: 2021-08-26

## 2021-09-01 ENCOUNTER — TELEPHONE (OUTPATIENT)
Dept: CARDIOLOGY | Facility: CLINIC | Age: 71
End: 2021-09-01

## 2021-09-01 NOTE — TELEPHONE ENCOUNTER
----- Message from ANDRES Khan sent at 8/26/2021  8:07 AM EDT -----  Kidney function is slightly worse.  How much spironolactone are you taking now?  How is swelling?

## 2021-09-01 NOTE — TELEPHONE ENCOUNTER
"Pt stated that he is taking Spironolactone 25 mg qod. He said the swelling is \"maybe a little better\".  "

## 2021-09-02 DIAGNOSIS — N18.31 STAGE 3A CHRONIC KIDNEY DISEASE (HCC): Primary | ICD-10-CM

## 2021-10-07 ENCOUNTER — PATIENT MESSAGE (OUTPATIENT)
Dept: CARDIOLOGY | Facility: CLINIC | Age: 71
End: 2021-10-07

## 2021-10-13 ENCOUNTER — LAB (OUTPATIENT)
Dept: LAB | Facility: HOSPITAL | Age: 71
End: 2021-10-13

## 2021-10-13 DIAGNOSIS — N18.31 STAGE 3A CHRONIC KIDNEY DISEASE (HCC): ICD-10-CM

## 2021-10-13 LAB
ANION GAP SERPL CALCULATED.3IONS-SCNC: 10.1 MMOL/L (ref 5–15)
BUN SERPL-MCNC: 24 MG/DL (ref 8–23)
BUN/CREAT SERPL: 17.9 (ref 7–25)
CALCIUM SPEC-SCNC: 9.4 MG/DL (ref 8.6–10.5)
CHLORIDE SERPL-SCNC: 104 MMOL/L (ref 98–107)
CO2 SERPL-SCNC: 24.9 MMOL/L (ref 22–29)
CREAT SERPL-MCNC: 1.34 MG/DL (ref 0.76–1.27)
GFR SERPL CREATININE-BSD FRML MDRD: 52 ML/MIN/1.73
GLUCOSE SERPL-MCNC: 97 MG/DL (ref 65–99)
POTASSIUM SERPL-SCNC: 4.5 MMOL/L (ref 3.5–5.2)
SODIUM SERPL-SCNC: 139 MMOL/L (ref 136–145)

## 2021-10-13 PROCEDURE — 80048 BASIC METABOLIC PNL TOTAL CA: CPT

## 2021-10-14 ENCOUNTER — TELEPHONE (OUTPATIENT)
Dept: CARDIOLOGY | Facility: CLINIC | Age: 71
End: 2021-10-14

## 2021-11-08 RX ORDER — VALSARTAN AND HYDROCHLOROTHIAZIDE 320; 25 MG/1; MG/1
TABLET, FILM COATED ORAL
Qty: 90 TABLET | Refills: 2 | Status: SHIPPED | OUTPATIENT
Start: 2021-11-08 | End: 2022-06-14

## 2021-11-08 NOTE — TELEPHONE ENCOUNTER
Rx Refill Note  Requested Prescriptions     Pending Prescriptions Disp Refills   • valsartan-hydrochlorothiazide (DIOVAN-HCT) 320-25 MG per tablet [Pharmacy Med Name: VALSARTAN/HYDROCHLOROTHIAZIDE 320-25 MG Tablet] 90 tablet      Sig: TAKE 1 TABLET BY ORAL ROUTE ONCE DAILY IN THE MORNING      Last office visit with prescribing clinician: 8/11/2021      Next office visit with prescribing clinician: Visit date not found            Manisha Benoit RN  11/08/21, 10:15 EST      Matches last ov on 8/11/21

## 2021-11-15 RX ORDER — AMLODIPINE BESYLATE 2.5 MG/1
TABLET ORAL
Qty: 90 TABLET | Refills: 2 | Status: SHIPPED | OUTPATIENT
Start: 2021-11-15 | End: 2022-06-22

## 2021-11-19 RX ORDER — ROSUVASTATIN CALCIUM 40 MG/1
TABLET, COATED ORAL
Qty: 90 TABLET | Refills: 3 | Status: SHIPPED | OUTPATIENT
Start: 2021-11-19 | End: 2022-09-28

## 2021-12-08 ENCOUNTER — LAB (OUTPATIENT)
Dept: LAB | Facility: HOSPITAL | Age: 71
End: 2021-12-08

## 2021-12-08 DIAGNOSIS — I48.0 PAROXYSMAL ATRIAL FIBRILLATION (HCC): ICD-10-CM

## 2021-12-08 LAB
BASOPHILS # BLD AUTO: 0.04 10*3/MM3 (ref 0–0.2)
BASOPHILS NFR BLD AUTO: 0.4 % (ref 0–1.5)
DEPRECATED RDW RBC AUTO: 38.7 FL (ref 37–54)
EOSINOPHIL # BLD AUTO: 0.33 10*3/MM3 (ref 0–0.4)
EOSINOPHIL NFR BLD AUTO: 3.5 % (ref 0.3–6.2)
ERYTHROCYTE [DISTWIDTH] IN BLOOD BY AUTOMATED COUNT: 11.9 % (ref 12.3–15.4)
HCT VFR BLD AUTO: 32.2 % (ref 37.5–51)
HGB BLD-MCNC: 10.7 G/DL (ref 13–17.7)
IMM GRANULOCYTES # BLD AUTO: 0.03 10*3/MM3 (ref 0–0.05)
IMM GRANULOCYTES NFR BLD AUTO: 0.3 % (ref 0–0.5)
LYMPHOCYTES # BLD AUTO: 1.44 10*3/MM3 (ref 0.7–3.1)
LYMPHOCYTES NFR BLD AUTO: 15.1 % (ref 19.6–45.3)
MCH RBC QN AUTO: 29.8 PG (ref 26.6–33)
MCHC RBC AUTO-ENTMCNC: 33.2 G/DL (ref 31.5–35.7)
MCV RBC AUTO: 89.7 FL (ref 79–97)
MONOCYTES # BLD AUTO: 0.77 10*3/MM3 (ref 0.1–0.9)
MONOCYTES NFR BLD AUTO: 8.1 % (ref 5–12)
NEUTROPHILS NFR BLD AUTO: 6.9 10*3/MM3 (ref 1.7–7)
NEUTROPHILS NFR BLD AUTO: 72.6 % (ref 42.7–76)
NRBC BLD AUTO-RTO: 0 /100 WBC (ref 0–0.2)
PLATELET # BLD AUTO: 254 10*3/MM3 (ref 140–450)
PMV BLD AUTO: 9.8 FL (ref 6–12)
RBC # BLD AUTO: 3.59 10*6/MM3 (ref 4.14–5.8)
WBC NRBC COR # BLD: 9.51 10*3/MM3 (ref 3.4–10.8)

## 2021-12-08 PROCEDURE — 36415 COLL VENOUS BLD VENIPUNCTURE: CPT

## 2021-12-08 PROCEDURE — 85025 COMPLETE CBC W/AUTO DIFF WBC: CPT

## 2021-12-16 ENCOUNTER — OFFICE VISIT (OUTPATIENT)
Dept: CARDIOLOGY | Facility: CLINIC | Age: 71
End: 2021-12-16

## 2021-12-16 ENCOUNTER — OFFICE VISIT (OUTPATIENT)
Dept: INTERNAL MEDICINE | Facility: CLINIC | Age: 71
End: 2021-12-16

## 2021-12-16 VITALS
HEIGHT: 72 IN | BODY MASS INDEX: 33.86 KG/M2 | WEIGHT: 250 LBS | SYSTOLIC BLOOD PRESSURE: 138 MMHG | HEART RATE: 58 BPM | DIASTOLIC BLOOD PRESSURE: 52 MMHG

## 2021-12-16 VITALS
SYSTOLIC BLOOD PRESSURE: 130 MMHG | HEIGHT: 72 IN | BODY MASS INDEX: 34.13 KG/M2 | OXYGEN SATURATION: 97 % | HEART RATE: 78 BPM | TEMPERATURE: 97.9 F | DIASTOLIC BLOOD PRESSURE: 62 MMHG | WEIGHT: 252 LBS

## 2021-12-16 DIAGNOSIS — Z12.5 PROSTATE CANCER SCREENING: ICD-10-CM

## 2021-12-16 DIAGNOSIS — Z00.00 INITIAL MEDICARE ANNUAL WELLNESS VISIT: Primary | ICD-10-CM

## 2021-12-16 DIAGNOSIS — E11.65 TYPE 2 DIABETES MELLITUS WITH HYPERGLYCEMIA, WITHOUT LONG-TERM CURRENT USE OF INSULIN (HCC): ICD-10-CM

## 2021-12-16 DIAGNOSIS — N18.31 STAGE 3A CHRONIC KIDNEY DISEASE (HCC): Chronic | ICD-10-CM

## 2021-12-16 DIAGNOSIS — I10 PRIMARY HYPERTENSION: ICD-10-CM

## 2021-12-16 DIAGNOSIS — I48.0 PAROXYSMAL ATRIAL FIBRILLATION (HCC): Primary | Chronic | ICD-10-CM

## 2021-12-16 DIAGNOSIS — I25.810 ATHEROSCLEROSIS OF CORONARY ARTERY BYPASS GRAFT OF NATIVE HEART WITHOUT ANGINA PECTORIS: ICD-10-CM

## 2021-12-16 DIAGNOSIS — E78.5 HYPERLIPIDEMIA, UNSPECIFIED HYPERLIPIDEMIA TYPE: ICD-10-CM

## 2021-12-16 DIAGNOSIS — Z99.89 OSA ON CPAP: ICD-10-CM

## 2021-12-16 DIAGNOSIS — G47.33 OSA ON CPAP: ICD-10-CM

## 2021-12-16 DIAGNOSIS — N52.9 ERECTILE DYSFUNCTION, UNSPECIFIED ERECTILE DYSFUNCTION TYPE: ICD-10-CM

## 2021-12-16 DIAGNOSIS — D64.9 ANEMIA, UNSPECIFIED TYPE: ICD-10-CM

## 2021-12-16 PROCEDURE — 1126F AMNT PAIN NOTED NONE PRSNT: CPT | Performed by: STUDENT IN AN ORGANIZED HEALTH CARE EDUCATION/TRAINING PROGRAM

## 2021-12-16 PROCEDURE — 99213 OFFICE O/P EST LOW 20 MIN: CPT | Performed by: STUDENT IN AN ORGANIZED HEALTH CARE EDUCATION/TRAINING PROGRAM

## 2021-12-16 PROCEDURE — 93000 ELECTROCARDIOGRAM COMPLETE: CPT | Performed by: INTERNAL MEDICINE

## 2021-12-16 PROCEDURE — 1170F FXNL STATUS ASSESSED: CPT | Performed by: STUDENT IN AN ORGANIZED HEALTH CARE EDUCATION/TRAINING PROGRAM

## 2021-12-16 PROCEDURE — 99214 OFFICE O/P EST MOD 30 MIN: CPT | Performed by: INTERNAL MEDICINE

## 2021-12-16 PROCEDURE — 1159F MED LIST DOCD IN RCRD: CPT | Performed by: STUDENT IN AN ORGANIZED HEALTH CARE EDUCATION/TRAINING PROGRAM

## 2021-12-16 PROCEDURE — G0438 PPPS, INITIAL VISIT: HCPCS | Performed by: STUDENT IN AN ORGANIZED HEALTH CARE EDUCATION/TRAINING PROGRAM

## 2021-12-16 RX ORDER — SILDENAFIL 25 MG/1
TABLET, FILM COATED ORAL
Qty: 30 TABLET | Refills: 1 | Status: SHIPPED | OUTPATIENT
Start: 2021-12-16 | End: 2022-09-27

## 2021-12-16 NOTE — PROGRESS NOTES
"The ABCs of the Annual Wellness Visit  Subsequent Medicare Wellness Visit    No chief complaint on file.     Subjective    History of Present Illness:  Toñito Kuo is a 71 y.o. male who presents for a Subsequent Medicare Wellness Visit.    The following portions of the patient's history were reviewed and   updated as appropriate: {history reviewed:20406::\"allergies\",\"current medications\",\"past family history\",\"past medical history\",\"past social history\",\"past surgical history\",\"problem list\"}.    Compared to one year ago, the patient feels his physical   health is {better worse same:46784}.    Compared to one year ago, the patient feels his mental   health is {better worse same:51504}.    Recent Hospitalizations:  {Hospital Admission Status in the last 365 days:78282}      Current Medical Providers:  Patient Care Team:  Belinda Bocanegra MD as PCP - General (Internal Medicine)    Outpatient Medications Prior to Visit   Medication Sig Dispense Refill   • amLODIPine (NORVASC) 2.5 MG tablet TAKE 1 TABLET EVERY DAY 90 tablet 2   • aspirin (aspirin) 81 MG EC tablet Take 81 mg by mouth Every Other Day.     • Eliquis 5 MG tablet tablet Take 5 mg by mouth Every 12 (Twelve) Hours.     • metFORMIN ER (GLUCOPHAGE-XR) 500 MG 24 hr tablet 2 (two) times a day.     • metoprolol succinate XL (TOPROL-XL) 50 MG 24 hr tablet metoprolol succinate 50 mg oral tablet extended release 24 hr TAKE 1 TABLET EVERY DAY 5/26/2021  Active     • multivitamin with minerals (ONE-A-DAY MENS 50+ PO) One-A-Day Men's 50 Plus 400- mcg oral tablet take 1 tablet by oral route daily   Active     • nitroglycerin (NITROSTAT) 0.4 MG SL tablet nitroglycerin 0.4 mg sublingual tablet, sublingual place 1 tablet (0.4 mg) by buccal route at the first sign of an attack; no more than 3 tabs are recommended within a 15 minute period.   Active     • rosuvastatin (CRESTOR) 40 MG tablet TAKE 1 TABLET EVERY DAY  (INSTEAD  OF  ATORVASTATIN) 90 tablet 3   • " spironolactone (ALDACTONE) 25 MG tablet Take 25 mg by mouth Every Other Day.     • triamcinolone (KENALOG) 0.1 % cream 2 (Two) Times a Day As Needed.     • valsartan-hydrochlorothiazide (DIOVAN-HCT) 320-25 MG per tablet TAKE 1 TABLET BY ORAL ROUTE ONCE DAILY IN THE MORNING 90 tablet 2     No facility-administered medications prior to visit.       No opioid medication identified on active medication list. I have reviewed chart for other potential  high risk medication/s and harmful drug interactions in the elderly.          Aspirin is on active medication list. {ASPIRIN ON MEDICATION LIST INDICATED/NOT INDICATED:56500}.      Patient Active Problem List   Diagnosis   • Atherosclerosis of coronary artery bypass graft of native heart without angina pectoris   • Hyperlipemia   • Hypertension   • Paroxysmal atrial fibrillation (HCC)   • Pedal edema   • Stage 3a chronic kidney disease (HCC)     Advance Care Planning  Advance Directive is not on file.  {ACP Discussion, Advance Directive not in EMR:22094}          Objective    There were no vitals filed for this visit.  BMI Readings from Last 1 Encounters:   08/11/21 33.63 kg/m²   BMI is above normal parameters. Recommendations include: {BMI Follow-up Overweight:05823}    Does the patient have evidence of cognitive impairment? {Yes/No:71349}    Physical Exam            HEALTH RISK ASSESSMENT    Smoking Status:  Social History     Tobacco Use   Smoking Status Never Smoker   Smokeless Tobacco Never Used     Alcohol Consumption:  Social History     Substance and Sexual Activity   Alcohol Use Yes    Comment: some days, started at 19     Fall Risk Screen:    NAHOMI Fall Risk Assessment has not been completed.    Depression Screening:  No flowsheet data found.    Health Habits and Functional and Cognitive Screening:  No flowsheet data found.    Age-appropriate Screening Schedule:  Refer to the list below for future screening recommendations based on patient's age, sex and/or  medical conditions. Orders for these recommended tests are listed in the plan section. The patient has been provided with a written plan.    Health Maintenance   Topic Date Due   • ZOSTER VACCINE (2 of 3) 07/25/2013   • DIABETIC FOOT EXAM  Never done   • HEMOGLOBIN A1C  07/12/2021   • INFLUENZA VACCINE  08/01/2021   • DIABETIC EYE EXAM  01/07/2022   • URINE MICROALBUMIN  01/12/2022   • LIPID PANEL  07/28/2022   • TDAP/TD VACCINES (3 - Td or Tdap) 04/12/2027              Assessment/Plan   CMS Preventative Services Quick Reference  Risk Factors Identified During Encounter  {Medicare Wellness Risk Factors:00438}  The above risks/problems have been discussed with the patient.  Follow up actions/plans if indicated are seen below in the Assessment/Plan Section.  Pertinent information has been shared with the patient in the After Visit Summary.    There are no diagnoses linked to this encounter.    Follow Up:   No follow-ups on file.     An After Visit Summary and PPPS were made available to the patient.    {Optional Chart Navigation Links Wrapup  Review (Popup)  Advance Care Planning  Labs  CC  Problem List  Visit Diagnosis  Medications  Result Review  Imaging  [facility] Maintenance  Quality  BestPractice  SmartSets  SnapShot  Encounters  Notes  Media  Procedures :23}    {Time Spent-Use for E/M Coding (Optional):84110}

## 2021-12-16 NOTE — PROGRESS NOTES
Chief Complaint   Patient presents with   • Medicare Wellness-subsequent   • Anemia   • Sleep Apnea   • Diabetes   • Erectile Dysfunction       The ABCs of the Annual Wellness Visit  Initial Medicare Wellness Visit    Chief Complaint   Patient presents with   • Medicare Wellness-subsequent   • Anemia   • Sleep Apnea   • Diabetes   • Erectile Dysfunction     Subjective   History of Present Illness:  Toñito Kuo is a 71 y.o. male who presents for an Initial Medicare Wellness Visit.    The following portions of the patient's history were reviewed and   updated as appropriate: allergies, current medications, past family history, past medical history, past social history, past surgical history and problem list.     Compared to one year ago, the patient feels his physical   health is the same.    Compared to one year ago, the patient feels his mental   health is the same.    He has concerned regarding erectile dysfunction, would like to dicuss medications.   Diabetes-chronic, due for labs.   Anemia-recent finding on labs obtained by cardiologist, sent back to pcp for further evaluation. Denies noticing any blood in his stools or urine.   RAUL:has questions re-recent recall of c-pap machines, would like to know if he is safe to continue using his machine.         Recent Hospitalizations:  He was not admitted to the hospital during the last year.       Current Medical Providers:  Patient Care Team:  Belinda Bocanegra MD as PCP - General (Internal Medicine)  Antoinette Tobar MD as Consulting Physician (Cardiology)    Outpatient Medications Prior to Visit   Medication Sig Dispense Refill   • amLODIPine (NORVASC) 2.5 MG tablet TAKE 1 TABLET EVERY DAY 90 tablet 2   • Eliquis 5 MG tablet tablet Take 5 mg by mouth Every 12 (Twelve) Hours.     • metoprolol succinate XL (TOPROL-XL) 50 MG 24 hr tablet metoprolol succinate 50 mg oral tablet extended release 24 hr TAKE 1 TABLET EVERY DAY 5/26/2021  Active     • multivitamin with  minerals (ONE-A-DAY MENS 50+ PO) One-A-Day Men's 50 Plus 400- mcg oral tablet take 1 tablet by oral route daily   Active     • nitroglycerin (NITROSTAT) 0.4 MG SL tablet nitroglycerin 0.4 mg sublingual tablet, sublingual place 1 tablet (0.4 mg) by buccal route at the first sign of an attack; no more than 3 tabs are recommended within a 15 minute period.   Active     • rosuvastatin (CRESTOR) 40 MG tablet TAKE 1 TABLET EVERY DAY  (INSTEAD  OF  ATORVASTATIN) 90 tablet 3   • spironolactone (ALDACTONE) 25 MG tablet Take 25 mg by mouth Every Other Day.     • triamcinolone (KENALOG) 0.1 % cream 2 (Two) Times a Day As Needed.     • valsartan-hydrochlorothiazide (DIOVAN-HCT) 320-25 MG per tablet TAKE 1 TABLET BY ORAL ROUTE ONCE DAILY IN THE MORNING 90 tablet 2   • metFORMIN ER (GLUCOPHAGE-XR) 500 MG 24 hr tablet 2 (two) times a day.       No facility-administered medications prior to visit.       No opioid medication identified on active medication list. I have reviewed chart for other potential  high risk medication/s and harmful drug interactions in the elderly.          Aspirin is on active medication list. Aspirin use is contraindicated for this patient due to: current use of Eliquis.  Patient has been instructed to discontinue this medication. .      Patient Active Problem List   Diagnosis   • Atherosclerosis of coronary artery bypass graft of native heart without angina pectoris   • Hyperlipemia   • Hypertension   • Paroxysmal atrial fibrillation (HCC)   • Pedal edema   • Stage 3a chronic kidney disease (HCC)   • Anemia   • RAUL on CPAP   • Type 2 diabetes mellitus with hyperglycemia, without long-term current use of insulin (HCC)     Advance Care Planning  Advance Directive is not on file.  ACP discussion was held with the patient during this visit. Patient has an advance directive (not in EMR), copy requested.          Objective       Vitals:    12/16/21 1420   BP: 130/62   BP Location: Right arm   Patient  "Position: Sitting   Cuff Size: Adult   Pulse: 78   Temp: 97.9 °F (36.6 °C)   TempSrc: Temporal   SpO2: 97%   Weight: 114 kg (252 lb)   Height: 182.9 cm (72\")   PainSc: 0-No pain     BMI Readings from Last 1 Encounters:   12/16/21 34.18 kg/m²   BMI is above normal parameters. Recommendations include: exercise counseling    Does the patient have evidence of cognitive impairment? No    Physical Exam  Lab Results   Component Value Date    TRIG 84 12/23/2021    HDL 49 12/23/2021    LDL 45 12/23/2021    VLDL 17 12/23/2021    HGBA1C 6.60 (H) 12/23/2021          HEALTH RISK ASSESSMENT    Smoking Status:  Social History     Tobacco Use   Smoking Status Never Smoker   Smokeless Tobacco Never Used     Alcohol Consumption:  Social History     Substance and Sexual Activity   Alcohol Use Yes    Comment: some days, started at 19     Fall Risk Screen:    NAHOMI Fall Risk Assessment was completed, and patient is at LOW risk for falls.Assessment completed on:12/16/2021    Depression Screen:   PHQ-2/PHQ-9 Depression Screening 12/16/2021   Little interest or pleasure in doing things 0   Feeling down, depressed, or hopeless 0   Total Score 0       Health Habits and Functional and Cognitive Screening:  Functional & Cognitive Status 12/16/2021   Do you have difficulty preparing food and eating? No   Do you have difficulty bathing yourself, getting dressed or grooming yourself? No   Do you have difficulty using the toilet? No   Do you have difficulty moving around from place to place? No   Do you have trouble with steps or getting out of a bed or a chair? No   Current Diet Well Balanced Diet   Dental Exam Up to date   Eye Exam Up to date   Exercise (times per week) 6 times per week   Current Exercises Include Yard Work   Do you need help using the phone?  No   Are you deaf or do you have serious difficulty hearing?  No   Do you need help with transportation? No   Do you need help shopping? No   Do you need help preparing meals?  No   Do " you need help with housework?  No   Do you need help with laundry? No   Do you need help taking your medications? No   Do you need help managing money? No   Do you ever drive or ride in a car without wearing a seat belt? No   Have you felt unusual stress, anger or loneliness in the last month? No   Who do you live with? Spouse   If you need help, do you have trouble finding someone available to you? No   Have you been bothered in the last four weeks by sexual problems? No   Do you have difficulty concentrating, remembering or making decisions? No       Age-appropriate Screening Schedule:  Refer to the list below for future screening recommendations based on patient's age, sex and/or medical conditions. Orders for these recommended tests are listed in the plan section. The patient has been provided with a written plan.    Health Maintenance   Topic Date Due   • ZOSTER VACCINE (2 of 3) 07/25/2013   • DIABETIC FOOT EXAM  Never done   • DIABETIC EYE EXAM  01/07/2022   • URINE MICROALBUMIN  01/12/2022   • HEMOGLOBIN A1C  06/23/2022   • LIPID PANEL  12/23/2022   • TDAP/TD VACCINES (3 - Td or Tdap) 04/12/2027   • INFLUENZA VACCINE  Completed            Assessment/Plan   CMS Preventative Services Quick Reference  Risk Factors Identified During Encounter  Hearing Problem  chronic.   The above risks/problems have been discussed with the patient.  Follow up actions/plans if indicated are seen below in the Assessment/Plan Section.  Pertinent information has been shared with the patient in the After Visit Summary.    Diagnoses and all orders for this visit:    1. Initial Medicare annual wellness visit (Primary)  Comments:  Questionnaires reviewed. Negative depression and fall screen. Has DPOA in place. No red flags noted.     2. Anemia, unspecified type  Assessment & Plan:  Mild and chronic on review of most recent labs with mild interval worsening. Repeating labs.Discussed potential need for referral to GI for evaluation with  endoscopies. His last colonoscopy was in April 2019 with findings of non-bleeding diverticula in the sigmoid colon and external hemorrhoids.     Orders:  -     CBC No Differential; Future  -     Iron and TIBC; Future  -     Ferritin; Future  -     Vitamin B12; Future  -     Folate; Future    3. RAUL on CPAP  Comments:  Chronic and stable. Pt with concern regarding recent cpap machine recall and would like to know next steps.     4. Type 2 diabetes mellitus with hyperglycemia, without long-term current use of insulin (HCC)  Comments:  Chronic and well controlled. Due for labs.   Orders:  -     Hemoglobin A1c; Future    5. Prostate cancer screening  Comments:  Due for repeat screen    Orders:  -     PSA SCREENING; Future    6. Erectile dysfunction, unspecified erectile dysfunction type  Comments:  Chronic, he has never been on viagra before and would like to initiate this agent today.   Orders:  -     sildenafil (Viagra) 25 MG tablet; Take 1 tablet prn, if no improvement ok to take 2 tablets at a time.  Dispense: 30 tablet; Refill: 1    Follow Up:  Return for Next scheduled follow up.     An After Visit Summary and PPPS were made available to the patient.

## 2021-12-16 NOTE — PROGRESS NOTES
Office Visit    Chief Complaint  Atrial Fibrillation and Leg Swelling    Subjective            Toñito Kuo presents to [facility] CARDIOLOGY  Mr. Kuo is a 71 years old gentleman with coronary disease hypertension hyperlipidemia paroxysmal atrial fibrillation who is done very well.  He denies any chest pain shortness of breath palpitations dizziness or syncope.  He has gained 2 pounds instead of losing weight.  He does not exercise on a regular basis and I have reviewed with him a plan to getting aerobic fitness      Past Medical History:   Diagnosis Date   • Bladder disease    • CAD (coronary artery disease) 11/09/2016    hx    • Cancer (HCC)    • Chest pain    • Coronary atherosclerosis of native coronary vessel 03/29/2015   • Diabetes mellitus ([facility])     type 2   • GERD (gastroesophageal reflux disease)    • Hard of hearing    • Heart attack ([facility])    • High cholesterol    • HTN (hypertension)    • Hyperlipemia    • Myocardial infarction ([facility])    • Paroxysmal atrial fibrillation ([facility]) 8/11/2021   • Seasonal allergies    • Sleep apnea     over 10 yrs ago   • Stage 3a chronic kidney disease ([facility]) 8/11/2021       Allergies   Allergen Reactions   • Penicillins Unknown - High Severity        Past Surgical History:   Procedure Laterality Date   • APPENDECTOMY      1957   • CARDIAC SURGERY      bypass; 2/2011   • COLONOSCOPY      diverticulosis   • CORONARY ARTERY BYPASS GRAFT     • HERNIA REPAIR     • TESTICLE SURGERY  1974        Social History     Tobacco Use   • Smoking status: Never Smoker   • Smokeless tobacco: Never Used   Vaping Use   • Vaping Use: Never used   Substance Use Topics   • Alcohol use: Yes     Comment: some days, started at 19   • Drug use: Never       Family History   Problem Relation Age of Onset   • Melanoma Mother    • Breast cancer Sister    • Colon cancer Maternal Grandmother    • Diabetes Maternal Grandmother    • Lung cancer Maternal Grandfather    • Breast cancer  Paternal Grandmother         Prior to Admission medications    Medication Sig Start Date End Date Taking? Authorizing Provider   amLODIPine (NORVASC) 2.5 MG tablet TAKE 1 TABLET EVERY DAY 11/15/21  Yes Tita Charles June, APRN   aspirin (aspirin) 81 MG EC tablet Take 81 mg by mouth Every Other Day.   Yes Donovan Ayala MD   Eliquis 5 MG tablet tablet Take 5 mg by mouth Every 12 (Twelve) Hours. 6/29/21  Yes Donovan Ayala MD   metFORMIN ER (GLUCOPHAGE-XR) 500 MG 24 hr tablet 2 (two) times a day. 6/9/21  Yes Donovan Ayala MD   metoprolol succinate XL (TOPROL-XL) 50 MG 24 hr tablet metoprolol succinate 50 mg oral tablet extended release 24 hr TAKE 1 TABLET EVERY DAY 5/26/2021  Active 5/26/21  Yes Donovan Ayala MD   multivitamin with minerals (ONE-A-DAY MENS 50+ PO) One-A-Day Men's 50 Plus 400- mcg oral tablet take 1 tablet by oral route daily   Active   Yes Donovan Ayala MD   nitroglycerin (NITROSTAT) 0.4 MG SL tablet nitroglycerin 0.4 mg sublingual tablet, sublingual place 1 tablet (0.4 mg) by buccal route at the first sign of an attack; no more than 3 tabs are recommended within a 15 minute period.   Active   Yes Donovan Ayala MD   rosuvastatin (CRESTOR) 40 MG tablet TAKE 1 TABLET EVERY DAY  (INSTEAD  OF  ATORVASTATIN) 11/19/21  Yes Antoinette Tobar MD   spironolactone (ALDACTONE) 25 MG tablet Take 25 mg by mouth Every Other Day. 5/25/21  Yes Donovan Ayala MD   triamcinolone (KENALOG) 0.1 % cream 2 (Two) Times a Day As Needed. 1/12/21  Yes Donovan Ayala MD   valsartan-hydrochlorothiazide (DIOVAN-HCT) 320-25 MG per tablet TAKE 1 TABLET BY ORAL ROUTE ONCE DAILY IN THE MORNING 11/8/21  Yes Tita Charles June, APRN        Review of Systems   Respiratory: Negative for cough and shortness of breath.    Cardiovascular: Positive for leg swelling. Negative for chest pain and palpitations.   Neurological: Negative for dizziness.        Objective     /52    "Pulse 58   Ht 182.9 cm (72\")   Wt 113 kg (250 lb)   BMI 33.91 kg/m²       Physical Exam  Constitutional:       General: He is awake.      Appearance: Normal appearance.   Neck:      Thyroid: No thyromegaly.      Vascular: No carotid bruit or JVD.   Cardiovascular:      Rate and Rhythm: Normal rate and regular rhythm.      Chest Wall: PMI is not displaced.      Pulses: Normal pulses.      Heart sounds: Normal heart sounds, S1 normal and S2 normal. No murmur heard.  No friction rub. No gallop. No S3 or S4 sounds.    Pulmonary:      Effort: Pulmonary effort is normal.      Breath sounds: Normal breath sounds and air entry. No wheezing, rhonchi or rales.   Abdominal:      General: Bowel sounds are normal.      Palpations: Abdomen is soft. There is no mass.      Tenderness: There is no abdominal tenderness.   Musculoskeletal:      Cervical back: Neck supple.      Right lower leg: No edema.      Left lower leg: No edema.   Neurological:      Mental Status: He is alert and oriented to person, place, and time.   Psychiatric:         Mood and Affect: Mood normal.         Behavior: Behavior is cooperative.           Result Review :                    ECG 12 Lead    Date/Time: 12/16/2021 2:26 PM  Performed by: Antoinette Tobar MD  Authorized by: Antoinette Tobar MD   Comments: Sinus rhythm APCs no significant change compared to prior EKG                Assessment and Plan        Diagnoses and all orders for this visit:    1. Paroxysmal atrial fibrillation (HCC) (Primary)  -     Lipid Panel; Future  -     Comprehensive Metabolic Panel; Future  -     Lipid Panel; Future  -     Comprehensive Metabolic Panel; Future  -     Magnesium; Future  -     ECG 12 Lead    2. Atherosclerosis of coronary artery bypass graft of native heart without angina pectoris  -     Lipid Panel; Future  -     Comprehensive Metabolic Panel; Future  -     Lipid Panel; Future  -     Comprehensive Metabolic Panel; Future  -     Magnesium; Future  -     ECG " 12 Lead    3. Hyperlipidemia, unspecified hyperlipidemia type  -     Lipid Panel; Future  -     Comprehensive Metabolic Panel; Future  -     Lipid Panel; Future  -     Comprehensive Metabolic Panel; Future  -     Magnesium; Future  -     ECG 12 Lead    4. Primary hypertension  -     Lipid Panel; Future  -     Comprehensive Metabolic Panel; Future  -     Lipid Panel; Future  -     Comprehensive Metabolic Panel; Future  -     Magnesium; Future  -     ECG 12 Lead    5. Stage 3a chronic kidney disease (HCC)  -     Lipid Panel; Future  -     Comprehensive Metabolic Panel; Future  -     Lipid Panel; Future  -     Comprehensive Metabolic Panel; Future  -     Magnesium; Future  -     ECG 12 Lead      Mr. Kuo has done very well and have a indicated to him to continue his medications in the current dosage.  He will get a chemistry panel lipid panel in the next few days and follow-up with us in 6 months with blood work 1 week before his appointment      Follow Up     Return in about 6 months (around 6/16/2022) for next ov with Cherry.    Patient was given instructions and counseling regarding his condition or for health maintenance advice. Please see specific information pulled into the AVS if appropriate.     Antoinette Tobar MD  12/16/21 12:30 EST

## 2021-12-22 ENCOUNTER — TELEPHONE (OUTPATIENT)
Dept: INTERNAL MEDICINE | Facility: CLINIC | Age: 71
End: 2021-12-22

## 2021-12-22 RX ORDER — METFORMIN HYDROCHLORIDE 500 MG/1
TABLET, EXTENDED RELEASE ORAL
Qty: 180 TABLET | Refills: 0 | Status: SHIPPED | OUTPATIENT
Start: 2021-12-22 | End: 2022-03-09

## 2021-12-23 ENCOUNTER — LAB (OUTPATIENT)
Dept: LAB | Facility: HOSPITAL | Age: 71
End: 2021-12-23

## 2021-12-23 DIAGNOSIS — I10 PRIMARY HYPERTENSION: ICD-10-CM

## 2021-12-23 DIAGNOSIS — E11.65 TYPE 2 DIABETES MELLITUS WITH HYPERGLYCEMIA, WITHOUT LONG-TERM CURRENT USE OF INSULIN (HCC): ICD-10-CM

## 2021-12-23 DIAGNOSIS — D64.9 ANEMIA, UNSPECIFIED TYPE: ICD-10-CM

## 2021-12-23 DIAGNOSIS — I48.0 PAROXYSMAL ATRIAL FIBRILLATION (HCC): Chronic | ICD-10-CM

## 2021-12-23 DIAGNOSIS — I25.810 ATHEROSCLEROSIS OF CORONARY ARTERY BYPASS GRAFT OF NATIVE HEART WITHOUT ANGINA PECTORIS: ICD-10-CM

## 2021-12-23 DIAGNOSIS — N18.31 STAGE 3A CHRONIC KIDNEY DISEASE (HCC): Chronic | ICD-10-CM

## 2021-12-23 DIAGNOSIS — Z12.5 PROSTATE CANCER SCREENING: ICD-10-CM

## 2021-12-23 DIAGNOSIS — E78.5 HYPERLIPIDEMIA, UNSPECIFIED HYPERLIPIDEMIA TYPE: ICD-10-CM

## 2021-12-23 PROBLEM — G47.33 OSA ON CPAP: Status: ACTIVE | Noted: 2021-12-23

## 2021-12-23 PROBLEM — Z99.89 OSA ON CPAP: Status: ACTIVE | Noted: 2021-12-23

## 2021-12-23 LAB
ALBUMIN SERPL-MCNC: 4.1 G/DL (ref 3.5–5.2)
ALBUMIN/GLOB SERPL: 1.3 G/DL
ALP SERPL-CCNC: 48 U/L (ref 39–117)
ALT SERPL W P-5'-P-CCNC: 19 U/L (ref 1–41)
ANION GAP SERPL CALCULATED.3IONS-SCNC: 11.5 MMOL/L (ref 5–15)
AST SERPL-CCNC: 22 U/L (ref 1–40)
BILIRUB SERPL-MCNC: 0.2 MG/DL (ref 0–1.2)
BUN SERPL-MCNC: 24 MG/DL (ref 8–23)
BUN/CREAT SERPL: 19 (ref 7–25)
CALCIUM SPEC-SCNC: 9.7 MG/DL (ref 8.6–10.5)
CHLORIDE SERPL-SCNC: 104 MMOL/L (ref 98–107)
CHOLEST SERPL-MCNC: 111 MG/DL (ref 0–200)
CO2 SERPL-SCNC: 24.5 MMOL/L (ref 22–29)
CREAT SERPL-MCNC: 1.26 MG/DL (ref 0.76–1.27)
DEPRECATED RDW RBC AUTO: 39.9 FL (ref 37–54)
ERYTHROCYTE [DISTWIDTH] IN BLOOD BY AUTOMATED COUNT: 12 % (ref 12.3–15.4)
FERRITIN SERPL-MCNC: 136 NG/ML (ref 30–400)
FOLATE SERPL-MCNC: 18.2 NG/ML (ref 4.78–24.2)
GFR SERPL CREATININE-BSD FRML MDRD: 56 ML/MIN/1.73
GLOBULIN UR ELPH-MCNC: 3.2 GM/DL
GLUCOSE SERPL-MCNC: 115 MG/DL (ref 65–99)
HBA1C MFR BLD: 6.6 % (ref 4.8–5.6)
HCT VFR BLD AUTO: 32.2 % (ref 37.5–51)
HDLC SERPL-MCNC: 49 MG/DL (ref 40–60)
HGB BLD-MCNC: 10.4 G/DL (ref 13–17.7)
IRON 24H UR-MRATE: 59 MCG/DL (ref 59–158)
IRON SATN MFR SERPL: 16 % (ref 20–50)
LDLC SERPL CALC-MCNC: 45 MG/DL (ref 0–100)
LDLC/HDLC SERPL: 0.92 {RATIO}
MCH RBC QN AUTO: 29.5 PG (ref 26.6–33)
MCHC RBC AUTO-ENTMCNC: 32.3 G/DL (ref 31.5–35.7)
MCV RBC AUTO: 91.2 FL (ref 79–97)
PLATELET # BLD AUTO: 310 10*3/MM3 (ref 140–450)
PMV BLD AUTO: 9.6 FL (ref 6–12)
POTASSIUM SERPL-SCNC: 4.6 MMOL/L (ref 3.5–5.2)
PROT SERPL-MCNC: 7.3 G/DL (ref 6–8.5)
PSA SERPL-MCNC: 2.05 NG/ML (ref 0–4)
RBC # BLD AUTO: 3.53 10*6/MM3 (ref 4.14–5.8)
SODIUM SERPL-SCNC: 140 MMOL/L (ref 136–145)
TIBC SERPL-MCNC: 364 MCG/DL (ref 298–536)
TRANSFERRIN SERPL-MCNC: 244 MG/DL (ref 200–360)
TRIGL SERPL-MCNC: 84 MG/DL (ref 0–150)
VIT B12 BLD-MCNC: 491 PG/ML (ref 211–946)
VLDLC SERPL-MCNC: 17 MG/DL (ref 5–40)
WBC NRBC COR # BLD: 5.75 10*3/MM3 (ref 3.4–10.8)

## 2021-12-23 PROCEDURE — 83540 ASSAY OF IRON: CPT

## 2021-12-23 PROCEDURE — 82728 ASSAY OF FERRITIN: CPT

## 2021-12-23 PROCEDURE — 82746 ASSAY OF FOLIC ACID SERUM: CPT

## 2021-12-23 PROCEDURE — 82607 VITAMIN B-12: CPT

## 2021-12-23 PROCEDURE — 80061 LIPID PANEL: CPT

## 2021-12-23 PROCEDURE — 80053 COMPREHEN METABOLIC PANEL: CPT

## 2021-12-23 PROCEDURE — 83036 HEMOGLOBIN GLYCOSYLATED A1C: CPT

## 2021-12-23 PROCEDURE — 36415 COLL VENOUS BLD VENIPUNCTURE: CPT

## 2021-12-23 PROCEDURE — 85027 COMPLETE CBC AUTOMATED: CPT

## 2021-12-23 PROCEDURE — 84466 ASSAY OF TRANSFERRIN: CPT

## 2021-12-23 PROCEDURE — G0103 PSA SCREENING: HCPCS

## 2021-12-23 NOTE — ASSESSMENT & PLAN NOTE
Mild and chronic on review of most recent labs with mild interval worsening. Repeating labs.Discussed potential need for referral to GI for evaluation with endoscopies. His last colonoscopy was in April 2019 with findings of non-bleeding diverticula in the sigmoid colon and external hemorrhoids.

## 2022-02-09 RX ORDER — APIXABAN 5 MG/1
TABLET, FILM COATED ORAL
Qty: 180 TABLET | Refills: 3 | Status: SHIPPED | OUTPATIENT
Start: 2022-02-09 | End: 2023-02-13

## 2022-02-23 RX ORDER — METOPROLOL SUCCINATE 50 MG/1
TABLET, EXTENDED RELEASE ORAL
Qty: 90 TABLET | Refills: 0 | Status: SHIPPED | OUTPATIENT
Start: 2022-02-23 | End: 2022-06-01

## 2022-03-09 RX ORDER — METFORMIN HYDROCHLORIDE 500 MG/1
TABLET, EXTENDED RELEASE ORAL
Qty: 180 TABLET | Refills: 0 | Status: SHIPPED | OUTPATIENT
Start: 2022-03-09 | End: 2022-05-11

## 2022-03-17 ENCOUNTER — TELEPHONE (OUTPATIENT)
Dept: CARDIOLOGY | Facility: CLINIC | Age: 72
End: 2022-03-17

## 2022-03-17 DIAGNOSIS — R06.02 SHORTNESS OF BREATH: ICD-10-CM

## 2022-03-17 DIAGNOSIS — I10 PRIMARY HYPERTENSION: Primary | ICD-10-CM

## 2022-03-17 DIAGNOSIS — R60.0 PEDAL EDEMA: ICD-10-CM

## 2022-03-17 NOTE — TELEPHONE ENCOUNTER
Received VM from patient.    Returned call. Patient stated that he has B/L lower extremity swelling. Denied SOB. Denied weight gain.    Confirmed taking spironolactone 25 mg QOD.    Advised I would discuss with Marilynn since Dr. Tobar and June were both out.

## 2022-03-17 NOTE — TELEPHONE ENCOUNTER
He can take spironolactone daily for the next 5 days then go back to every other day. Advise compression socks/ace bandage wrap for compression therapy. Limit fluid intake to no more than 2L/day. Low sodium diet (no more than 2g/day). CMP and BNP in 1 week

## 2022-03-18 ENCOUNTER — TELEPHONE (OUTPATIENT)
Dept: INTERNAL MEDICINE | Facility: CLINIC | Age: 72
End: 2022-03-18

## 2022-03-18 NOTE — TELEPHONE ENCOUNTER
Caller: Toñito Kuo    Relationship: [facility]    Best call back number: [phone]    What is the medical concern/diagnosis: SLEEP APNEA, CPAP MACHINE    What specialty or service is being requested: SLEEP DOCTOR    What is the provider, practice or medical service name: UNKNOWN, DOCTORS SUGGESTION    What is the office location: PATIENT PREFERS [place]

## 2022-03-18 NOTE — TELEPHONE ENCOUNTER
Pt said he was seeing a dr for his sleep apnea, that left town.  He was wanting another referral to see a provider in [facility].

## 2022-03-30 NOTE — TELEPHONE ENCOUNTER
Scheduled with sleep medicine next week. Patient instructed to bring copy of sleep study and sd card from old machine to his appointment. Voiced understanding, gave location and phone number for sleep center.

## 2022-04-06 ENCOUNTER — OFFICE VISIT (OUTPATIENT)
Dept: SLEEP MEDICINE | Facility: HOSPITAL | Age: 72
End: 2022-04-06

## 2022-04-06 ENCOUNTER — LAB (OUTPATIENT)
Dept: LAB | Facility: HOSPITAL | Age: 72
End: 2022-04-06

## 2022-04-06 VITALS
HEIGHT: 72 IN | DIASTOLIC BLOOD PRESSURE: 53 MMHG | WEIGHT: 258 LBS | HEART RATE: 73 BPM | BODY MASS INDEX: 34.95 KG/M2 | OXYGEN SATURATION: 97 % | TEMPERATURE: 97.7 F | SYSTOLIC BLOOD PRESSURE: 143 MMHG

## 2022-04-06 DIAGNOSIS — I10 PRIMARY HYPERTENSION: ICD-10-CM

## 2022-04-06 DIAGNOSIS — I48.0 PAROXYSMAL ATRIAL FIBRILLATION: Chronic | ICD-10-CM

## 2022-04-06 DIAGNOSIS — E78.5 HYPERLIPIDEMIA, UNSPECIFIED HYPERLIPIDEMIA TYPE: ICD-10-CM

## 2022-04-06 DIAGNOSIS — R60.0 PEDAL EDEMA: ICD-10-CM

## 2022-04-06 DIAGNOSIS — E66.9 CLASS 1 OBESITY: ICD-10-CM

## 2022-04-06 DIAGNOSIS — G47.33 OSA ON CPAP: Primary | ICD-10-CM

## 2022-04-06 DIAGNOSIS — R06.02 SHORTNESS OF BREATH: ICD-10-CM

## 2022-04-06 DIAGNOSIS — Z99.89 OSA ON CPAP: Primary | ICD-10-CM

## 2022-04-06 DIAGNOSIS — I10 ESSENTIAL HYPERTENSION: ICD-10-CM

## 2022-04-06 PROBLEM — E66.811 CLASS 1 OBESITY: Status: ACTIVE | Noted: 2022-04-06

## 2022-04-06 LAB
ALBUMIN SERPL-MCNC: 4.8 G/DL (ref 3.5–5.2)
ALBUMIN/GLOB SERPL: 1.7 G/DL
ALP SERPL-CCNC: 54 U/L (ref 39–117)
ALT SERPL W P-5'-P-CCNC: 18 U/L (ref 1–41)
ANION GAP SERPL CALCULATED.3IONS-SCNC: 10 MMOL/L (ref 5–15)
AST SERPL-CCNC: 22 U/L (ref 1–40)
BILIRUB SERPL-MCNC: 0.2 MG/DL (ref 0–1.2)
BUN SERPL-MCNC: 29 MG/DL (ref 8–23)
BUN/CREAT SERPL: 19.2 (ref 7–25)
CALCIUM SPEC-SCNC: 9.9 MG/DL (ref 8.6–10.5)
CHLORIDE SERPL-SCNC: 104 MMOL/L (ref 98–107)
CHOLEST SERPL-MCNC: 120 MG/DL (ref 0–200)
CO2 SERPL-SCNC: 26 MMOL/L (ref 22–29)
CREAT SERPL-MCNC: 1.51 MG/DL (ref 0.76–1.27)
EGFRCR SERPLBLD CKD-EPI 2021: 48.5 ML/MIN/1.73
GLOBULIN UR ELPH-MCNC: 2.8 GM/DL
GLUCOSE SERPL-MCNC: 111 MG/DL (ref 65–99)
HDLC SERPL-MCNC: 53 MG/DL (ref 40–60)
LDLC SERPL CALC-MCNC: 52 MG/DL (ref 0–100)
LDLC/HDLC SERPL: 0.98 {RATIO}
MAGNESIUM SERPL-MCNC: 2.3 MG/DL (ref 1.6–2.4)
NT-PROBNP SERPL-MCNC: 213 PG/ML (ref 0–900)
POTASSIUM SERPL-SCNC: 4.5 MMOL/L (ref 3.5–5.2)
PROT SERPL-MCNC: 7.6 G/DL (ref 6–8.5)
SODIUM SERPL-SCNC: 140 MMOL/L (ref 136–145)
TRIGL SERPL-MCNC: 76 MG/DL (ref 0–150)
VLDLC SERPL-MCNC: 15 MG/DL (ref 5–40)

## 2022-04-06 PROCEDURE — 99204 OFFICE O/P NEW MOD 45 MIN: CPT | Performed by: INTERNAL MEDICINE

## 2022-04-06 PROCEDURE — 83735 ASSAY OF MAGNESIUM: CPT

## 2022-04-06 PROCEDURE — 83880 ASSAY OF NATRIURETIC PEPTIDE: CPT

## 2022-04-06 PROCEDURE — 80061 LIPID PANEL: CPT

## 2022-04-06 PROCEDURE — 36415 COLL VENOUS BLD VENIPUNCTURE: CPT

## 2022-04-06 PROCEDURE — 80053 COMPREHEN METABOLIC PANEL: CPT

## 2022-04-06 PROCEDURE — G0463 HOSPITAL OUTPT CLINIC VISIT: HCPCS | Performed by: INTERNAL MEDICINE

## 2022-04-06 NOTE — PROGRESS NOTES
[address]  [place]   [place] [address]  Phone: [phone]  Fax: [phone]      Toñito Kuo  [id number]   1950  71 y.o.  male      PCP:Belinda Bocanegra MD    Type of service: Initial New Patient Office Visit  Date of service: 4/6/2022    Chief Complaint   Patient presents with   • Sleep Apnea   • Obesity       History of present illness;  Toñito Kuo 71 y.o.  is a new patient for me and was seen today for management of obstructive sleep apnea.    He had a previous sleep study at [facility] [place] in [place].  Date of the test was August 28, 2007 which is a split-night study showed severe sleep apnea with AHI of 104.9/h and he was given a auto CPAP.  He has been using the CPAP since then and recently was seen at telemedicine and was getting supplies from [facility].  Unfortunately the telemedicine is closed and he wanted to transfer his care to [facility].    Patient is here to get a travel CPAP as patient travels a lot and he has a RV.  In addition he says that he was trying to replace form and is Respironics dream station CPAP and it has quit working and he is using another spare one now.      Patient gives the following sleep history.  Sleep schedule:  Bedtime: 11 PM  Wake time: 7 AM  Normally takes about 5-10 minutes to fall asleep  Average hours of sleep 8  Number of naps per day 0    The Smart card downloaded on 4/6/2022 has been independently reviewed by me and discussed the data with the patient. It shows the following..  January 16, 2022 2 February 14, 2022  Compliance; 87%  > 4 hr use, 80%  Average use of the device 6 hours and 47 per night  Residual AHI: 4.5 /hr (normal less than 5)  Device: DreamStation 1  DME: [facility]        Past medical history: (Relevant to sleep medicine)  1. Severe sleep apnea with AHI of 104.9/h  2. Hypertension  3. Hyperlipidemia  4. Diabetes mellitus 2  5. Atrial  "fibrillation    • Medications are reviewed by me and documented in the encounter  • Allergies reviewed and documented in encounter    Social history:  Do you drive a commercial vehicle:  No   Shift work:  No   Tobacco use:  No   Alcohol use:  1 per week  Caffeinated drinks: 3    FAMILY HISTORY (Your mother, father, brothers and sisters)  1. Sleep apnea, son    REVIEW OF SYSTEMS.  Full review of systems available on the intake form which is scanned in the media tab.  The relevant positive are noted below  1. Daytime excessive sleepiness with [place] Sleepiness Scale :Total score: 1   2. Snoring resolved with the CPAP      Physical exam:  Vitals:    04/06/22 1100   BP: 143/53   Pulse: 73   Temp: 97.7 °F (36.5 °C)   SpO2: 97%   Weight: 117 kg (258 lb)   Height: 182.9 cm (72\")    Body mass index is 34.99 kg/m².    HEENT: Head is atraumatic, normocephalic  Eyes: pupils are round equal and reacting to light and accommodation, conjunctiva normal  Nose: no nasal septal defects or deviation and the nasal passages are clear, no nasal polyps,  Throat: tonsils are , tongue normal, oral airway Mallampati class 3  NECK: , trachea is in the midline, thyroid not enlarged  RESPIRATORY SYSTEM: Breath sounds are equal on both sides, there are no wheezes   CARDIOVASULAR SYSTEM: Heart sounds are regular rhythm and jamie rate, no edema  EXTREMITES: No cyanosis, clubbing  NEUROLOGICAL SYSTEM: Oriented x 3, no gross motor defects, gait normal    Labs reviewed.  TSH Results:  TSH    TSH 7/28/21   TSH 2.460            Most Recent A1C    HGBA1C Most Recent 12/23/21   Hemoglobin A1C 6.60 (A)   (A) Abnormal value               Assessment and plan:  Obstructive sleep apnea ( G 47.33).  The symptoms of sleep apnea have improved with the device and the treatment.  Patient's compliance with the device is excellent for treatment of sleep apnea.  I have independently reviewed the smart card down load and discussed with the patient the download data " and encouarged the patient to continue to use the device.The residual AHI is acceptable. The device is benefiting the patient and the device is medically necessary. Without proper control of sleep apnea and good compliance there is a increased risk for hypertension, diabetes mellitus and nonrestorative sleep with hypersomnia which can increase risk for motor vehicle accidents.  Untreated sleep apnea is also a risk factor for development of atrial fibrillation, pulmonary hypertension and stroke.The patient is also instructed to get the supplies from the DME company and and change them on a regular basis.  A prescription for supplies has been sent to the [employer] company.  I have also discussed the good sleep hygiene habits and adequate amount of sleep needed for good health.  I have given him a prescription for travel CPAP with a set pressure of 9 cm or he can buy auto CPAP.  I talked to the patient about insurance coverage which does not cover travel CPAP's.  He has to buy this out-of-pocket.  In addition also talked to him about going back to [facility] and see whether he is eligible for endocrine new CPAP or a replacement.  He needs to register for replacement as there is DreamStation is on recall.  · Snoring (R06.83) snoring is the sound created by turbulent airflow vibrating upper airway soft tissue.  I have also discussed factors affecting snoring including sleep deprivation, sleeping on the back and alcohol ingestion. To minimize snoring, patient is advised to have adequate sleep, sleep on the side and avoid alcohol and sedative medications before bedtime  · Obesity 2, with BMI Body mass index is 34.99 kg/m².. I have discussed the relationship between weight and sleep apnea.There is direct correlation between weight and severity of sleep apnea.  Weight reduction is encouraged, as it is going to reduce the severity of sleep apnea. I have also discussed with the patient diet and exercise to achieve ideal body  weight  · Hypertension,  Essential hypertension is highly correlated with sleep apnea. Treating sleep apnea will assist in good blood pressure control.  · Atrial fibrillation    I have also discussed with the patient the following  • Sleep hygiene: Maintaining a regular bedtime and wake time, not to watch television or work in bed, limit caffeine-containing beverages before bed time and avoid naps during the day  • Adequate amount of sleep.  Generally most people needs about 7 to 8 hours of sleep.  • Return in about 1 year (around 4/6/2023) for Annual visit with smartcard download..  Patient's questions were answered.        Debora Shepard MD  Sleep Medicine.  Medical Director, [facility] sleep centers  4/6/2022 ,

## 2022-04-07 ENCOUNTER — TELEPHONE (OUTPATIENT)
Dept: CARDIOLOGY | Facility: CLINIC | Age: 72
End: 2022-04-07

## 2022-04-07 DIAGNOSIS — N18.31 STAGE 3A CHRONIC KIDNEY DISEASE: Primary | ICD-10-CM

## 2022-04-07 NOTE — TELEPHONE ENCOUNTER
----- Message from ANDRES Khan sent at 4/7/2022  8:34 AM EDT -----  Cholesterols are good continue current cholesterol medicines.  Kidney function slightly worse again.  How much spironolactone is she taking?

## 2022-04-07 NOTE — TELEPHONE ENCOUNTER
Pt stated he is taking Spironolactone 25 mg qod. 3 weeks ago he took it 5 days in a row due to swelling.

## 2022-04-08 ENCOUNTER — TELEPHONE (OUTPATIENT)
Dept: CARDIOLOGY | Facility: CLINIC | Age: 72
End: 2022-04-08

## 2022-04-08 NOTE — TELEPHONE ENCOUNTER
Okay to increase spironolactone occasionally for swelling or shortness of breath.  We will continue to monitor her kidney function.  Check BMP in 2 weeks

## 2022-04-08 NOTE — TELEPHONE ENCOUNTER
Me     AP    4/8/22 10:43 AM  Note    Spoke with pt and his wife Tracey, relayed msg. She did want to verify if he should continue to take it on M/ W/ F (originally said qod)? She said he has been on 3 days/ wk x a month. Please verify. BMP still in 2 weeks also?

## 2022-04-09 DIAGNOSIS — N18.31 STAGE 3A CHRONIC KIDNEY DISEASE: Primary | ICD-10-CM

## 2022-04-09 RX ORDER — SPIRONOLACTONE 25 MG/1
TABLET ORAL
Qty: 30 TABLET | Refills: 11 | Status: SHIPPED | OUTPATIENT
Start: 2022-04-09 | End: 2022-05-10

## 2022-04-11 ENCOUNTER — TELEPHONE (OUTPATIENT)
Dept: CARDIOLOGY | Facility: CLINIC | Age: 72
End: 2022-04-11

## 2022-04-26 ENCOUNTER — LAB (OUTPATIENT)
Dept: LAB | Facility: HOSPITAL | Age: 72
End: 2022-04-26

## 2022-04-26 DIAGNOSIS — N18.31 STAGE 3A CHRONIC KIDNEY DISEASE: ICD-10-CM

## 2022-04-26 LAB
ANION GAP SERPL CALCULATED.3IONS-SCNC: 12.3 MMOL/L (ref 5–15)
BUN SERPL-MCNC: 32 MG/DL (ref 8–23)
BUN/CREAT SERPL: 22.2 (ref 7–25)
CALCIUM SPEC-SCNC: 9.6 MG/DL (ref 8.6–10.5)
CHLORIDE SERPL-SCNC: 103 MMOL/L (ref 98–107)
CO2 SERPL-SCNC: 23.7 MMOL/L (ref 22–29)
CREAT SERPL-MCNC: 1.44 MG/DL (ref 0.76–1.27)
EGFRCR SERPLBLD CKD-EPI 2021: 51.3 ML/MIN/1.73
GLUCOSE SERPL-MCNC: 113 MG/DL (ref 65–99)
POTASSIUM SERPL-SCNC: 4.4 MMOL/L (ref 3.5–5.2)
SODIUM SERPL-SCNC: 139 MMOL/L (ref 136–145)

## 2022-04-26 PROCEDURE — 80048 BASIC METABOLIC PNL TOTAL CA: CPT

## 2022-04-26 PROCEDURE — 36415 COLL VENOUS BLD VENIPUNCTURE: CPT

## 2022-05-06 NOTE — TELEPHONE ENCOUNTER
Relayed msg. Lab done. Pt would like to have his fasting labs prior to his appt in June- could you put this order in?

## 2022-05-09 DIAGNOSIS — N18.31 STAGE 3A CHRONIC KIDNEY DISEASE: ICD-10-CM

## 2022-05-09 DIAGNOSIS — E78.5 HYPERLIPIDEMIA, UNSPECIFIED HYPERLIPIDEMIA TYPE: Primary | ICD-10-CM

## 2022-05-10 RX ORDER — SPIRONOLACTONE 25 MG/1
TABLET ORAL
Qty: 90 TABLET | Refills: 1 | Status: SHIPPED | OUTPATIENT
Start: 2022-05-10 | End: 2023-02-27 | Stop reason: SDUPTHER

## 2022-05-11 RX ORDER — METFORMIN HYDROCHLORIDE 500 MG/1
TABLET, EXTENDED RELEASE ORAL
Qty: 180 TABLET | Refills: 0 | Status: SHIPPED | OUTPATIENT
Start: 2022-05-11 | End: 2022-10-24

## 2022-06-01 RX ORDER — METOPROLOL SUCCINATE 50 MG/1
TABLET, EXTENDED RELEASE ORAL
Qty: 90 TABLET | Refills: 0 | Status: SHIPPED | OUTPATIENT
Start: 2022-06-01

## 2022-06-13 ENCOUNTER — LAB (OUTPATIENT)
Dept: LAB | Facility: HOSPITAL | Age: 72
End: 2022-06-13

## 2022-06-13 DIAGNOSIS — I48.0 PAROXYSMAL ATRIAL FIBRILLATION: Chronic | ICD-10-CM

## 2022-06-13 DIAGNOSIS — N18.31 STAGE 3A CHRONIC KIDNEY DISEASE: Chronic | ICD-10-CM

## 2022-06-13 DIAGNOSIS — E78.5 HYPERLIPIDEMIA, UNSPECIFIED HYPERLIPIDEMIA TYPE: ICD-10-CM

## 2022-06-13 DIAGNOSIS — I10 PRIMARY HYPERTENSION: ICD-10-CM

## 2022-06-13 DIAGNOSIS — I25.810 ATHEROSCLEROSIS OF CORONARY ARTERY BYPASS GRAFT OF NATIVE HEART WITHOUT ANGINA PECTORIS: ICD-10-CM

## 2022-06-13 PROCEDURE — 83735 ASSAY OF MAGNESIUM: CPT

## 2022-06-13 PROCEDURE — 80061 LIPID PANEL: CPT

## 2022-06-13 PROCEDURE — 80053 COMPREHEN METABOLIC PANEL: CPT

## 2022-06-13 PROCEDURE — 36415 COLL VENOUS BLD VENIPUNCTURE: CPT

## 2022-06-14 LAB
ALBUMIN SERPL-MCNC: 4.7 G/DL (ref 3.5–5.2)
ALBUMIN/GLOB SERPL: 1.6 G/DL
ALP SERPL-CCNC: 56 U/L (ref 39–117)
ALT SERPL W P-5'-P-CCNC: 16 U/L (ref 1–41)
ANION GAP SERPL CALCULATED.3IONS-SCNC: 9 MMOL/L (ref 5–15)
AST SERPL-CCNC: 19 U/L (ref 1–40)
BILIRUB SERPL-MCNC: 0.3 MG/DL (ref 0–1.2)
BUN SERPL-MCNC: 36 MG/DL (ref 8–23)
BUN/CREAT SERPL: 24 (ref 7–25)
CALCIUM SPEC-SCNC: 9.8 MG/DL (ref 8.6–10.5)
CHLORIDE SERPL-SCNC: 106 MMOL/L (ref 98–107)
CHOLEST SERPL-MCNC: 124 MG/DL (ref 0–200)
CO2 SERPL-SCNC: 25 MMOL/L (ref 22–29)
CREAT SERPL-MCNC: 1.5 MG/DL (ref 0.76–1.27)
EGFRCR SERPLBLD CKD-EPI 2021: 48.9 ML/MIN/1.73
GLOBULIN UR ELPH-MCNC: 2.9 GM/DL
GLUCOSE SERPL-MCNC: 100 MG/DL (ref 65–99)
HDLC SERPL-MCNC: 51 MG/DL (ref 40–60)
LDLC SERPL CALC-MCNC: 58 MG/DL (ref 0–100)
LDLC/HDLC SERPL: 1.13 {RATIO}
MAGNESIUM SERPL-MCNC: 2.4 MG/DL (ref 1.6–2.4)
POTASSIUM SERPL-SCNC: 4.3 MMOL/L (ref 3.5–5.2)
PROT SERPL-MCNC: 7.6 G/DL (ref 6–8.5)
SODIUM SERPL-SCNC: 140 MMOL/L (ref 136–145)
TRIGL SERPL-MCNC: 76 MG/DL (ref 0–150)
VLDLC SERPL-MCNC: 15 MG/DL (ref 5–40)

## 2022-06-14 RX ORDER — VALSARTAN AND HYDROCHLOROTHIAZIDE 320; 25 MG/1; MG/1
TABLET, FILM COATED ORAL
Qty: 90 TABLET | Refills: 0 | Status: SHIPPED | OUTPATIENT
Start: 2022-06-14 | End: 2022-10-04

## 2022-06-14 NOTE — PROGRESS NOTES
Chief Complaint  Atrial Fibrillation, Hypertension, Hyperlipidemia, and Leg Swelling    Subjective        Toñito Kuo presents to [facility]  He is a 71-year-old male who has a history of Coronary artery disease with previous bypass surgery x1 in 2011 comes in now to have evaluate his coronary artery disease, hypertension, hyperlipidemia and atrial arrhythmias.  Has occasional pedal edema its long-term and normal for him.  Complains of fatigue and feels like it slightly worse.  Denies any chest pains, shortness of breath, palpitations, dizziness, syncope, swelling, PND, or orthopnea.  Cardiac wise he has no further complaints.  Takes all his meds regularly and tolerates it without any side effects.       Past History:    Past Medical History:   Diagnosis Date   • Bladder disease    • CAD (coronary artery disease) 11/09/2016    hx    • Cancer (HCC)    • Chest pain    • Coronary atherosclerosis of native coronary vessel 03/29/2015   • Diabetes mellitus (HCC)     type 2   • GERD (gastroesophageal reflux disease)    • Hard of hearing    • Heart attack (HCC)    • High cholesterol    • HTN (hypertension)    • Hyperlipemia    • Myocardial infarction (HCC)    • Paroxysmal atrial fibrillation (HCC) 8/11/2021   • Seasonal allergies    • Sleep apnea     over 10 yrs ago   • Stage 3a chronic kidney disease (HCC) 8/11/2021        Family History: family history includes Breast cancer in his paternal grandmother and sister; Colon cancer in his maternal grandmother; Diabetes in his maternal grandmother; Lung cancer in his maternal grandfather; Melanoma in his mother.     Social History: reports that he has never smoked. He has never used smokeless tobacco. He reports current alcohol use. He reports that he does not use drugs.    Allergies: Penicillins    Past Surgical History:   Procedure Laterality Date   • APPENDECTOMY      1957   • CARDIAC SURGERY      bypass; 2/2011   • COLONOSCOPY       diverticulosis   • CORONARY ARTERY BYPASS GRAFT     • HERNIA REPAIR     • TESTICLE SURGERY  1974          Current Outpatient Medications:   •  amLODIPine (NORVASC) 2.5 MG tablet, TAKE 1 TABLET EVERY DAY, Disp: 90 tablet, Rfl: 2  •  Eliquis 5 MG tablet tablet, TAKE 1 TABLET TWICE DAILY  FOR  90  DAYS, Disp: 180 tablet, Rfl: 3  •  metFORMIN ER (GLUCOPHAGE-XR) 500 MG 24 hr tablet, TAKE 1 TABLET TWICE DAILY WITH MEALS, Disp: 180 tablet, Rfl: 0  •  metoprolol succinate XL (TOPROL-XL) 50 MG 24 hr tablet, TAKE 1 TABLET EVERY DAY, Disp: 90 tablet, Rfl: 0  •  multivitamin with minerals tablet tablet, One-A-Day Men's 50 Plus 400- mcg oral tablet take 1 tablet by oral route daily   Active, Disp: , Rfl:   •  nitroglycerin (NITROSTAT) 0.4 MG SL tablet, nitroglycerin 0.4 mg sublingual tablet, sublingual place 1 tablet (0.4 mg) by buccal route at the first sign of an attack; no more than 3 tabs are recommended within a 15 minute period.   Active, Disp: , Rfl:   •  rosuvastatin (CRESTOR) 40 MG tablet, TAKE 1 TABLET EVERY DAY  (INSTEAD  OF  ATORVASTATIN), Disp: 90 tablet, Rfl: 3  •  sildenafil (Viagra) 25 MG tablet, Take 1 tablet prn, if no improvement ok to take 2 tablets at a time., Disp: 30 tablet, Rfl: 1  •  spironolactone (ALDACTONE) 25 MG tablet, TAKE 1 TABLET EVERY OTHER DAY, Disp: 90 tablet, Rfl: 1  •  triamcinolone (KENALOG) 0.1 % cream, 2 (Two) Times a Day As Needed., Disp: , Rfl:   •  valsartan-hydrochlorothiazide (DIOVAN-HCT) 320-25 MG per tablet, TAKE 1 TABLET EVERY MORNING, Disp: 90 tablet, Rfl: 0     There are no discontinued medications.     Review of Systems   Constitutional: Positive for fatigue.   Respiratory: Negative for cough and shortness of breath.    Cardiovascular: Positive for leg swelling. Negative for chest pain and palpitations.   All other systems reviewed and are negative.       Objective     Physical Exam  Constitutional:       General: He is not in acute distress.     Appearance: He is obese.  "  Neck:      Vascular: No carotid bruit.   Cardiovascular:      Rate and Rhythm: Normal rate and regular rhythm.      Chest Wall: PMI is displaced.      Heart sounds: Heart sounds are distant.   Pulmonary:      Effort: Pulmonary effort is normal.      Breath sounds: Normal breath sounds.   Musculoskeletal:      Right lower leg: No edema.      Left lower leg: No edema.   Neurological:      Mental Status: He is alert.       /47   Pulse 59   Ht 182.9 cm (72\")   Wt 114 kg (252 lb)   BMI 34.18 kg/m²       Vitals:    06/20/22 1225   BP: 113/47   Pulse: 59       Result Review :         The following data was reviewed by: ANDRES Khan on 06/20/2022:      Lab Results   Component Value Date    PROBNP 213.0 04/06/2022     CMP    CMP 4/6/22 4/26/22 6/13/22   Glucose 111 (A) 113 (A) 100 (A)   BUN 29 (A) 32 (A) 36 (A)   Creatinine 1.51 (A) 1.44 (A) 1.50 (A)   Sodium 140 139 140   Potassium 4.5 4.4 4.3   Chloride 104 103 106   Calcium 9.9 9.6 9.8   Albumin 4.80  4.70   Total Bilirubin 0.2  0.3   Alkaline Phosphatase 54  56   AST (SGOT) 22  19   ALT (SGPT) 18  16   (A) Abnormal value            CBC w/diff    CBC w/Diff 12/8/21 12/23/21   WBC 9.51 5.75   RBC 3.59 (A) 3.53 (A)   Hemoglobin 10.7 (A) 10.4 (A)   Hematocrit 32.2 (A) 32.2 (A)   MCV 89.7 91.2   MCH 29.8 29.5   MCHC 33.2 32.3   RDW 11.9 (A) 12.0 (A)   Platelets 254 310   Neutrophil Rel % 72.6    Immature Granulocyte Rel % 0.3    Lymphocyte Rel % 15.1 (A)    Monocyte Rel % 8.1    Eosinophil Rel % 3.5    Basophil Rel % 0.4    (A) Abnormal value             Lipid Panel    Lipid Panel 12/23/21 4/6/22 6/13/22   Total Cholesterol 111 120 124   Triglycerides 84 76 76   HDL Cholesterol 49 53 51   VLDL Cholesterol 17 15 15   LDL Cholesterol  45 52 58   LDL/HDL Ratio 0.92 0.98 1.13            Lab Results   Component Value Date    TSH 2.460 07/28/2021    TSH 2.120 06/01/2020      Lab Results   Component Value Date    FREET4 1.21 07/28/2021    FREET4 1.1 06/01/2020 "      Magnesium   Date Value Ref Range Status   06/13/2022 2.4 1.6 - 2.4 mg/dL Final      A1C Last 3 Results    HGBA1C Last 3 Results 12/23/21   Hemoglobin A1C 6.60 (A)   (A) Abnormal value                          Assessment and Plan    Diagnoses and all orders for this visit:    1. Paroxysmal atrial fibrillation (HCC) (Primary)  Assessment & Plan:  Currently in sinus rhythm.  Continue Eliquis 5 mg twice daily.      2. Atherosclerosis of coronary artery bypass graft of native heart without angina pectoris  Assessment & Plan:  Stable without angina.  Not on aspirin due to Eliquis.  We will continue to monitor.      3. Hyperlipidemia, unspecified hyperlipidemia type  Assessment & Plan:  Lipids at goal.  Continue rosuvastatin 40 mg.    Orders:  -     Lipid Panel; Future  -     Comprehensive Metabolic Panel; Future    4. Primary hypertension  Assessment & Plan:  Controlled.  Continue valsartan /25 1 tab a day, spironolactone 25 every other day, amlodipine 2.5 mg, and metoprolol ER 50 mg.      5. Stage 3a chronic kidney disease (HCC)  Assessment & Plan:  Stable.  Continue to avoid NSAIDs.      6. Anemia, unspecified type  Assessment & Plan:  We will check a CBC in view of his increasing fatigue.  If worsening will recommend he see PCP for further evaluations    Orders:  -     CBC Auto Differential; Future    7. Pedal edema  Assessment & Plan:  Can take spironolactone every day for 3 to 4 days for increased swelling as needed and then back to regular dose            Follow Up     Return in about 6 months (around 12/20/2022) for with [place].    Patient was given instructions and counseling regarding his condition or for health maintenance advice. Please see specific information pulled into the AVS if appropriate.       ANDRES Renteria  06/20/22 10:23 EDT

## 2022-06-20 ENCOUNTER — OFFICE VISIT (OUTPATIENT)
Dept: CARDIOLOGY | Facility: CLINIC | Age: 72
End: 2022-06-20

## 2022-06-20 VITALS
BODY MASS INDEX: 34.13 KG/M2 | WEIGHT: 252 LBS | SYSTOLIC BLOOD PRESSURE: 113 MMHG | DIASTOLIC BLOOD PRESSURE: 47 MMHG | HEART RATE: 59 BPM | HEIGHT: 72 IN

## 2022-06-20 DIAGNOSIS — E78.5 HYPERLIPIDEMIA, UNSPECIFIED HYPERLIPIDEMIA TYPE: ICD-10-CM

## 2022-06-20 DIAGNOSIS — I10 PRIMARY HYPERTENSION: ICD-10-CM

## 2022-06-20 DIAGNOSIS — D64.9 ANEMIA, UNSPECIFIED TYPE: ICD-10-CM

## 2022-06-20 DIAGNOSIS — R60.0 PEDAL EDEMA: ICD-10-CM

## 2022-06-20 DIAGNOSIS — I25.810 ATHEROSCLEROSIS OF CORONARY ARTERY BYPASS GRAFT OF NATIVE HEART WITHOUT ANGINA PECTORIS: ICD-10-CM

## 2022-06-20 DIAGNOSIS — N18.31 STAGE 3A CHRONIC KIDNEY DISEASE: Chronic | ICD-10-CM

## 2022-06-20 DIAGNOSIS — I48.0 PAROXYSMAL ATRIAL FIBRILLATION: Primary | Chronic | ICD-10-CM

## 2022-06-20 PROCEDURE — 99214 OFFICE O/P EST MOD 30 MIN: CPT | Performed by: NURSE PRACTITIONER

## 2022-06-20 NOTE — ASSESSMENT & PLAN NOTE
Controlled.  Continue valsartan /25 1 tab a day, spironolactone 25 every other day, amlodipine 2.5 mg, and metoprolol ER 50 mg.

## 2022-06-20 NOTE — ASSESSMENT & PLAN NOTE
We will check a CBC in view of his increasing fatigue.  If worsening will recommend he see PCP for further evaluations

## 2022-06-20 NOTE — ASSESSMENT & PLAN NOTE
Can take spironolactone every day for 3 to 4 days for increased swelling as needed and then back to regular dose

## 2022-06-20 NOTE — PATIENT INSTRUCTIONS
Can take spironolactone every day for 3-4 days and then back to regular dose of every other day if needed for swelling

## 2022-06-22 RX ORDER — AMLODIPINE BESYLATE 2.5 MG/1
TABLET ORAL
Qty: 90 TABLET | Refills: 2 | Status: SHIPPED | OUTPATIENT
Start: 2022-06-22

## 2022-07-12 ENCOUNTER — TELEPHONE (OUTPATIENT)
Dept: INTERNAL MEDICINE | Facility: CLINIC | Age: 72
End: 2022-07-12

## 2022-07-12 NOTE — TELEPHONE ENCOUNTER
Caller: CLINTON FLORENCE    Relationship to patient: Emergency Contact    Best call back number: [phone]    Chief complaint FOLLWO UP FOR ANKLE AND LEG SWELLING     Type of visit: OFFICE VISIT     Requested date: BETWEEN THE LAST WEEK OF July 2022  AND THE FIRST WEEK OF AUGUST 2022            HUB ATTEMPTED TO SCHEDULE WITH PCP AND HAD NO APPOINTMENTS UNTIL 10/25/2022 AND PATIENT WANTS SEEN SOONER. THANKS.     CLINTON FLORENCE IS ON THE  [place].

## 2022-07-29 ENCOUNTER — OFFICE VISIT (OUTPATIENT)
Dept: ORTHOPEDIC SURGERY | Facility: CLINIC | Age: 72
End: 2022-07-29

## 2022-07-29 VITALS — BODY MASS INDEX: 34.13 KG/M2 | WEIGHT: 252 LBS | HEIGHT: 72 IN | OXYGEN SATURATION: 99 % | HEART RATE: 69 BPM

## 2022-07-29 DIAGNOSIS — M16.11 PRIMARY OSTEOARTHRITIS OF RIGHT HIP: ICD-10-CM

## 2022-07-29 DIAGNOSIS — M25.551 BILATERAL HIP PAIN: Primary | ICD-10-CM

## 2022-07-29 DIAGNOSIS — M16.12 PRIMARY OSTEOARTHRITIS OF LEFT HIP: ICD-10-CM

## 2022-07-29 DIAGNOSIS — M25.552 BILATERAL HIP PAIN: Primary | ICD-10-CM

## 2022-07-29 PROCEDURE — 99213 OFFICE O/P EST LOW 20 MIN: CPT | Performed by: ORTHOPAEDIC SURGERY

## 2022-07-29 PROCEDURE — 96372 THER/PROPH/DIAG INJ SC/IM: CPT | Performed by: ORTHOPAEDIC SURGERY

## 2022-07-29 RX ORDER — DEXAMETHASONE SODIUM PHOSPHATE 4 MG/ML
8 INJECTION, SOLUTION INTRA-ARTICULAR; INTRALESIONAL; INTRAMUSCULAR; INTRAVENOUS; SOFT TISSUE ONCE
Status: COMPLETED | OUTPATIENT
Start: 2022-07-29 | End: 2022-07-29

## 2022-07-29 RX ADMIN — DEXAMETHASONE SODIUM PHOSPHATE 8 MG: 4 INJECTION, SOLUTION INTRA-ARTICULAR; INTRALESIONAL; INTRAMUSCULAR; INTRAVENOUS; SOFT TISSUE at 10:11

## 2022-07-29 NOTE — PROGRESS NOTES
"Chief Complaint  Initial Evaluation of the Left Hip and Initial Evaluation of the Right Hip     Subjective      Toñito Kuo presents to [facility] for an evaluation of bilateral hips. He states left hip pain is worse than the right at this time. He has been having increasing pain with no new injury or trauma. Right hip pain has been ongoing for some time but left hip pain began 3-4 months ago. He has difficulty with lifting the leg. At night he has a lot of discomfort of the lateral hip.     Allergies   Allergen Reactions   • Penicillins Unknown - High Severity        Social History     Socioeconomic History   • Marital status:    Tobacco Use   • Smoking status: Never Smoker   • Smokeless tobacco: Never Used   Vaping Use   • Vaping Use: Never used   Substance and Sexual Activity   • Alcohol use: Yes     Comment: some days, started at 19   • Drug use: Never   • Sexual activity: Defer        Review of Systems     Objective   Vital Signs:   Pulse 69   Ht 182.9 cm (72\")   Wt 114 kg (252 lb)   SpO2 99%   BMI 34.18 kg/m²       Physical Exam  Constitutional:       Appearance: Normal appearance. Patient is well-developed and normal weight.   HENT:      Head: Normocephalic.      Right Ear: Hearing and external ear normal.      Left Ear: Hearing and external ear normal.      Nose: Nose normal.   Eyes:      Conjunctiva/sclera: Conjunctivae normal.   Cardiovascular:      Rate and Rhythm: Normal rate.   Pulmonary:      Effort: Pulmonary effort is normal.      Breath sounds: No wheezing or rales.   Abdominal:      Palpations: Abdomen is soft.      Tenderness: There is no abdominal tenderness.   Musculoskeletal:      Cervical back: Normal range of motion.   Skin:     Findings: No rash.   Neurological:      Mental Status: Patient is alert and oriented to person, place, and time.   Psychiatric:         Mood and Affect: Mood and affect normal.         Judgment: Judgment normal.       Ortho " Exam      RIGHT HIP: Hip flexion to 120 degrees. Good tone of hip flexors, hip extensors, hip adductor, hip abductors. Abduction to 30 degrees. Er to 45 degrees with pain. Calf supple, non-tender, no signs of DVT. Dorsal Pedal Pulse 2+, posterior tibialis pulse 2+.     LEFT HIP: Hip flexion to 90 degrees with pain. Good tone of hip flexors, hip extensors, hip adductor, hip abductors. Calf supple, non-tender, no signs of DVT. Dorsal Pedal Pulse 2+, posterior tibialis pulse 2+. Antalgic gait. Abduction to 20 degrees. Er to 40 degrees with pain.       Procedures      Imaging Results (Most Recent)     Procedure Component Value Units Date/Time    XR Hip With or Without Pelvis 2 - 3 View Left [[id number]] Resulted: 07/29/22 0945     Updated: 07/29/22 0948           Result Review :     X-Ray Report:  Bilateral hip(s) X-Ray  Indication: Evaluation of bilateral hips   AP and Lateral view(s)  Findings: Bilateral hip osteoarthritis, advanced. No acute fractures or dislocation.   Prior studies available for comparison: no     Assessment and Plan     Diagnoses and all orders for this visit:    1. Bilateral hip pain (Primary)  -     XR Hip With or Without Pelvis 2 - 3 View Left  -     XR Hip With or Without Pelvis 2 - 3 View Right; Future    2. Primary osteoarthritis of left hip    3. Primary osteoarthritis of right hip        He takes Eliquis, he can't have NSAIDs. He is candidate for a hip replacement, he would need clearance prior to this. Discussed therapy, home exercises and injections. Hip injection administered, patient tolerated this well.     Call or return if worsening symptoms.    Follow Up     PRN.       Patient was given instructions and counseling regarding his condition or for health maintenance advice. Please see specific information pulled into the AVS if appropriate.     Scribed for Ravinder Hawley MD by Tegan Estrada.  07/29/22   09:51 EDT    I have personally performed the services described in this document  as scribed by the above individual and it is both accurate and complete. Ravinder Hawley MD 07/29/22

## 2022-08-04 ENCOUNTER — OFFICE VISIT (OUTPATIENT)
Dept: INTERNAL MEDICINE | Facility: CLINIC | Age: 72
End: 2022-08-04

## 2022-08-04 VITALS
OXYGEN SATURATION: 98 % | HEART RATE: 55 BPM | SYSTOLIC BLOOD PRESSURE: 112 MMHG | WEIGHT: 253.6 LBS | DIASTOLIC BLOOD PRESSURE: 74 MMHG | HEIGHT: 72 IN | TEMPERATURE: 98 F | BODY MASS INDEX: 34.35 KG/M2

## 2022-08-04 DIAGNOSIS — E11.65 TYPE 2 DIABETES MELLITUS WITH HYPERGLYCEMIA, WITHOUT LONG-TERM CURRENT USE OF INSULIN: ICD-10-CM

## 2022-08-04 DIAGNOSIS — N18.31 STAGE 3A CHRONIC KIDNEY DISEASE: Chronic | ICD-10-CM

## 2022-08-04 DIAGNOSIS — I10 PRIMARY HYPERTENSION: Primary | ICD-10-CM

## 2022-08-04 DIAGNOSIS — R60.0 PEDAL EDEMA: ICD-10-CM

## 2022-08-04 DIAGNOSIS — E78.5 HYPERLIPIDEMIA, UNSPECIFIED HYPERLIPIDEMIA TYPE: ICD-10-CM

## 2022-08-04 DIAGNOSIS — M25.552 LEFT HIP PAIN: ICD-10-CM

## 2022-08-04 PROCEDURE — 99214 OFFICE O/P EST MOD 30 MIN: CPT | Performed by: NURSE PRACTITIONER

## 2022-08-04 NOTE — ASSESSMENT & PLAN NOTE
Reviewed recent labs, glucose level was 100, he does not report any concerns with medication or diet.  Last A1c was checked in December 2021, he does not wish to have any blood drawn today, so recommend follow-up in about 3 months for repeat lab work.

## 2022-08-04 NOTE — PROGRESS NOTES
Chief Complaint  Joint Swelling (Bilateral ankle swelling.), Procedure (Patient wants to talk about a notice he got that he is due for colonoscopy. /Also that he has seen Dr. Hawley about a hip replacement), and Headache (Pt states that he has had mild a headache that comes and goes for several weeks now. States this is not typical for him. )    Subjective         Toñito Kuo presents to [facility] for Regular follow-up for chronic conditions including hypertension, hyperlipidemia diabetes, kidney disease, concerns regarding his pedal edema, headaches, hip pain, and colonoscopy.    Patient comes in today, he was little frustrated as his last visit that was with his PCP, he and his wife sat in the room and waited for over an hour, and no one checked on them, so they ultimately just walked out.  Patient states that he has talked with the manager since and things have been worked out.  But he did let me know that that is how his last visit went.  He reports that overall most of his conditions are under good control.  He is a little concerned about pedal edema, he has had issues with this for quite some time, he sees cardiology, and recently seen in June, where they increased his spironolactone temporarily, which he states has helped quite significantly.  He still has some swelling, but doing much better.  He takes all of his medications with good compliance.  He has been having issues with his hip, he has been working with Dr. Hawley at orthopedics, they are considering surgery.  Patient received a letter in the mail pertaining to a colonoscopy, he was curious if this was doing or not, he did not bring the letter with him today.  Patient has also reported intermittent headaches, which is not something that he is used to, but they have not been severe enough for him to even take over-the-counter medications, they usually resolve themselves within a couple of hours.    Otherwise, no  "significant concerns or complaints today.         Review of Systems    Objective   Vital Signs:   /74 (BP Location: Right arm, Patient Position: Sitting, Cuff Size: Adult)   Pulse 55   Temp 98 °F (36.7 °C) (Oral)   Ht 182.9 cm (72\")   Wt 115 kg (253 lb 9.6 oz)   SpO2 98%   BMI 34.39 kg/m²     Physical Exam  Vitals and nursing note reviewed.   Constitutional:       Appearance: Normal appearance.   HENT:      Head: Normocephalic and atraumatic.   Cardiovascular:      Rate and Rhythm: Normal rate.   Pulmonary:      Effort: Pulmonary effort is normal.   Musculoskeletal:      Right lower le+ Edema present.      Left lower le+ Edema present.   Neurological:      Mental Status: He is alert.   Psychiatric:         Mood and Affect: Mood normal.         Thought Content: Thought content normal.        Result Review :                   Diagnoses and all orders for this visit:    1. Primary hypertension (Primary)  Assessment & Plan:  Currently well controlled, continue current regimen, follow-up if needed.      2. Hyperlipidemia, unspecified hyperlipidemia type  Assessment & Plan:  Well-controlled currently, will need to come in 3 months for repeat lab work.      3. Type 2 diabetes mellitus with hyperglycemia, without long-term current use of insulin (HCC)  Assessment & Plan:  Reviewed recent labs, glucose level was 100, he does not report any concerns with medication or diet.  Last A1c was checked in 2021, he does not wish to have any blood drawn today, so recommend follow-up in about 3 months for repeat lab work.      4. Stage 3a chronic kidney disease (HCC)  Assessment & Plan:  Labs reviewed, seems to be stable at this time.  Continue to monitor.      5. Pedal edema  Assessment & Plan:  Patient still has ongoing pedal edema, he was seen and evaluated by cardiology in mid , they did encourage him to take daily dose of spironolactone for 5 days, which patient states the swelling is much better " than what it was at that time.  He was getting ready to go on a trip, he was concerned that his swelling usually gets worse when traveling.  We did discuss regular rest breaks, where he can elevate his legs, get out and ambulate every couple of hours.  Advised that he could go ahead and use daily dose for another 5 days before leaving on his trip, that may be helpful.  If it worsens at all, I did recommend that he follow-up closely with cardiology.      6. Left hip pain  Comments:  Has been working with Ortho, he is debating on replacement surgery, will follow up with Ortho as needed.        Follow Up   Return in about 3 months (around 11/4/2022) for Recheck, Medicare Wellness.  Patient was given instructions and counseling regarding his condition or for health maintenance advice. Please see specific information pulled into the AVS if appropriate.     Dawson Nowak, ANDRES  8/4/2022  This note was electronically signed.

## 2022-08-04 NOTE — ASSESSMENT & PLAN NOTE
Patient still has ongoing pedal edema, he was seen and evaluated by cardiology in mid June, they did encourage him to take daily dose of spironolactone for 5 days, which patient states the swelling is much better than what it was at that time.  He was getting ready to go on a trip, he was concerned that his swelling usually gets worse when traveling.  We did discuss regular rest breaks, where he can elevate his legs, get out and ambulate every couple of hours.  Advised that he could go ahead and use daily dose for another 5 days before leaving on his trip, that may be helpful.  If it worsens at all, I did recommend that he follow-up closely with cardiology.

## 2022-08-05 ENCOUNTER — TELEPHONE (OUTPATIENT)
Dept: SURGERY | Facility: CLINIC | Age: 72
End: 2022-08-05

## 2022-08-05 NOTE — TELEPHONE ENCOUNTER
Pt called inquiring about when he should have another colonoscopy. He said he received a letter stating it was time to get one. I couldn't find anything in the chart to tell me a timeframe from his last colonoscopy so I printed the colonoscopy report from 2019 and had Dr. Burris look at it. He said the patient should have another colonoscopy in 2024. I called pt and told him. He voiced understanding. New recall placed for 2024 colonoscopy reminder.

## 2022-08-09 ENCOUNTER — OFFICE VISIT (OUTPATIENT)
Dept: ORTHOPEDIC SURGERY | Facility: CLINIC | Age: 72
End: 2022-08-09

## 2022-08-09 ENCOUNTER — PREP FOR SURGERY (OUTPATIENT)
Dept: OTHER | Facility: HOSPITAL | Age: 72
End: 2022-08-09

## 2022-08-09 VITALS — HEIGHT: 72 IN | WEIGHT: 254 LBS | HEART RATE: 68 BPM | OXYGEN SATURATION: 99 % | BODY MASS INDEX: 34.4 KG/M2

## 2022-08-09 DIAGNOSIS — M16.12 PRIMARY OSTEOARTHRITIS OF LEFT HIP: ICD-10-CM

## 2022-08-09 DIAGNOSIS — M16.11 PRIMARY OSTEOARTHRITIS OF RIGHT HIP: Primary | ICD-10-CM

## 2022-08-09 PROBLEM — M16.9 OA (OSTEOARTHRITIS) OF HIP: Status: ACTIVE | Noted: 2022-08-09

## 2022-08-09 PROCEDURE — 99214 OFFICE O/P EST MOD 30 MIN: CPT | Performed by: ORTHOPAEDIC SURGERY

## 2022-08-09 RX ORDER — TRANEXAMIC ACID 10 MG/ML
1000 INJECTION, SOLUTION INTRAVENOUS ONCE
Status: CANCELLED | OUTPATIENT
Start: 2022-08-09 | End: 2022-08-09

## 2022-08-09 RX ORDER — POVIDONE-IODINE 10 MG/ML
SOLUTION TOPICAL ONCE
Status: CANCELLED | OUTPATIENT
Start: 2022-08-09 | End: 2022-08-09

## 2022-08-10 NOTE — PROGRESS NOTES
"Chief Complaint  Pain and Follow-up of the Right Hip     Subjective      Toñito Kuo presents to [facility] for a follow-up of right hip. Patient has a history of right hip osteoarthritis that he has been treating conservatively. Pain is in the groin. Pain has worsened and he has limited motion of the hip. Patient reports decrease in activities due to pain. Patient has a history of left hip osteoarthritis as well. He states left hip is worse than the right today.     Allergies   Allergen Reactions   • Penicillins Unknown - High Severity        Social History     Socioeconomic History   • Marital status:    Tobacco Use   • Smoking status: Never Smoker   • Smokeless tobacco: Never Used   Vaping Use   • Vaping Use: Never used   Substance and Sexual Activity   • Alcohol use: Yes     Comment: some days, started at 19   • Drug use: Never   • Sexual activity: Defer        Review of Systems     Objective   Vital Signs:   Pulse 68   Ht 182.9 cm (72\")   Wt 115 kg (254 lb)   SpO2 99%   BMI 34.45 kg/m²       Physical Exam  Constitutional:       Appearance: Normal appearance. Patient is well-developed and normal weight.   HENT:      Head: Normocephalic.      Right Ear: Hearing and external ear normal.      Left Ear: Hearing and external ear normal.      Nose: Nose normal.   Eyes:      Conjunctiva/sclera: Conjunctivae normal.   Cardiovascular:      Rate and Rhythm: Normal rate.   Pulmonary:      Effort: Pulmonary effort is normal.      Breath sounds: No wheezing or rales.   Abdominal:      Palpations: Abdomen is soft.      Tenderness: There is no abdominal tenderness.   Musculoskeletal:      Cervical back: Normal range of motion.   Skin:     Findings: No rash.   Neurological:      Mental Status: Patient is alert and oriented to person, place, and time.   Psychiatric:         Mood and Affect: Mood and affect normal.         Judgment: Judgment normal.       Ortho Exam      BILATERAL HIP: " Tender hip and pelvic muscles. Impingement signs. ER to 5 degrees. IR to 15 degrees. Limping gait. Groin pain with hip motion. Limited examination due hip pain and lack of motion.     Procedures      Imaging Results (Most Recent)     None           Result Review :         XR Hip With or Without Pelvis 2 - 3 View Left    Result Date: 7/29/2022  Narrative: X-Ray Report: Bilateral hip(s) X-Ray Indication: Evaluation of bilateral hips AP and Lateral view(s) Findings: Bilateral hip osteoarthritis, advanced. No acute fractures or dislocation. Prior studies available for comparison: no     XR Hip With or Without Pelvis 2 - 3 View Right    Result Date: 7/29/2022  Narrative: X-Ray Report: Bilateral hip(s) X-Ray Indication: Evaluation of bilateral hips AP and Lateral view(s) Findings: Bilateral hip osteoarthritis, advanced. No acute fractures or dislocation. Prior studies available for comparison: no              Assessment and Plan     Diagnoses and all orders for this visit:    1. Primary osteoarthritis of right hip (Primary)    2. Primary osteoarthritis of left hip        Risks and benefits of right total hip replacement discussed, he wishes to proceed.     Discussed surgery., Risks/benefits discussed with patient including, but not limited to: infection, bleeding, neurovascular damage, malunion, nonunion, aesthetic deformity, need for further surgery, and death., Discussed with patient the implant type being used during surgery and patient understands and desires to proceed. and Surgery pamphlet given.    Follow Up     Post-operatively.       Patient was given instructions and counseling regarding his condition or for health maintenance advice. Please see specific information pulled into the AVS if appropriate.     Scribed for Ravinder Hawley MD by Tegan Estrada.  08/10/22   12:07 EDT    I have personally performed the services described in this document as scribed by the above individual and it is both accurate and  complete. Ravinder Hawley MD 08/11/22

## 2022-09-13 ENCOUNTER — TELEPHONE (OUTPATIENT)
Dept: CARDIOLOGY | Facility: CLINIC | Age: 72
End: 2022-09-13

## 2022-09-13 NOTE — TELEPHONE ENCOUNTER
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

## 2022-09-19 DIAGNOSIS — Z47.1 AFTERCARE FOLLOWING LEFT HIP JOINT REPLACEMENT SURGERY: Primary | ICD-10-CM

## 2022-09-19 DIAGNOSIS — Z96.642 AFTERCARE FOLLOWING LEFT HIP JOINT REPLACEMENT SURGERY: Primary | ICD-10-CM

## 2022-09-27 ENCOUNTER — PRE-ADMISSION TESTING (OUTPATIENT)
Dept: PREADMISSION TESTING | Facility: HOSPITAL | Age: 72
End: 2022-09-27

## 2022-09-27 VITALS
OXYGEN SATURATION: 97 % | HEART RATE: 69 BPM | HEIGHT: 72 IN | RESPIRATION RATE: 16 BRPM | SYSTOLIC BLOOD PRESSURE: 134 MMHG | TEMPERATURE: 98.1 F | BODY MASS INDEX: 34.37 KG/M2 | WEIGHT: 253.75 LBS | DIASTOLIC BLOOD PRESSURE: 72 MMHG

## 2022-09-27 DIAGNOSIS — Z47.1 AFTERCARE FOLLOWING LEFT HIP JOINT REPLACEMENT SURGERY: Primary | ICD-10-CM

## 2022-09-27 DIAGNOSIS — Z96.642 AFTERCARE FOLLOWING LEFT HIP JOINT REPLACEMENT SURGERY: Primary | ICD-10-CM

## 2022-09-27 DIAGNOSIS — M16.11 PRIMARY OSTEOARTHRITIS OF RIGHT HIP: ICD-10-CM

## 2022-09-27 LAB
ABO GROUP BLD: NORMAL
ALBUMIN SERPL-MCNC: 4.3 G/DL (ref 3.5–5.2)
ALBUMIN/GLOB SERPL: 1.4 G/DL
ALP SERPL-CCNC: 57 U/L (ref 39–117)
ALT SERPL W P-5'-P-CCNC: 11 U/L (ref 1–41)
ANION GAP SERPL CALCULATED.3IONS-SCNC: 10.2 MMOL/L (ref 5–15)
AST SERPL-CCNC: 16 U/L (ref 1–40)
BASOPHILS # BLD AUTO: 0.06 10*3/MM3 (ref 0–0.2)
BASOPHILS NFR BLD AUTO: 0.9 % (ref 0–1.5)
BILIRUB SERPL-MCNC: 0.2 MG/DL (ref 0–1.2)
BUN SERPL-MCNC: 25 MG/DL (ref 8–23)
BUN/CREAT SERPL: 18.7 (ref 7–25)
CALCIUM SPEC-SCNC: 9.3 MG/DL (ref 8.6–10.5)
CHLORIDE SERPL-SCNC: 102 MMOL/L (ref 98–107)
CO2 SERPL-SCNC: 26.8 MMOL/L (ref 22–29)
CREAT SERPL-MCNC: 1.34 MG/DL (ref 0.76–1.27)
DEPRECATED RDW RBC AUTO: 46.5 FL (ref 37–54)
EGFRCR SERPLBLD CKD-EPI 2021: 55.6 ML/MIN/1.73
EOSINOPHIL # BLD AUTO: 0.29 10*3/MM3 (ref 0–0.4)
EOSINOPHIL NFR BLD AUTO: 4.4 % (ref 0.3–6.2)
ERYTHROCYTE [DISTWIDTH] IN BLOOD BY AUTOMATED COUNT: 14 % (ref 12.3–15.4)
GLOBULIN UR ELPH-MCNC: 3 GM/DL
GLUCOSE SERPL-MCNC: 112 MG/DL (ref 65–99)
HBA1C MFR BLD: 6.4 % (ref 4.8–5.6)
HCT VFR BLD AUTO: 31.2 % (ref 37.5–51)
HGB BLD-MCNC: 10 G/DL (ref 13–17.7)
IMM GRANULOCYTES # BLD AUTO: 0.02 10*3/MM3 (ref 0–0.05)
IMM GRANULOCYTES NFR BLD AUTO: 0.3 % (ref 0–0.5)
INR PPP: 1.14 (ref 0.86–1.15)
LYMPHOCYTES # BLD AUTO: 0.93 10*3/MM3 (ref 0.7–3.1)
LYMPHOCYTES NFR BLD AUTO: 14.1 % (ref 19.6–45.3)
MCH RBC QN AUTO: 29.2 PG (ref 26.6–33)
MCHC RBC AUTO-ENTMCNC: 32.1 G/DL (ref 31.5–35.7)
MCV RBC AUTO: 91 FL (ref 79–97)
MONOCYTES # BLD AUTO: 0.66 10*3/MM3 (ref 0.1–0.9)
MONOCYTES NFR BLD AUTO: 10 % (ref 5–12)
NEUTROPHILS NFR BLD AUTO: 4.65 10*3/MM3 (ref 1.7–7)
NEUTROPHILS NFR BLD AUTO: 70.3 % (ref 42.7–76)
NRBC BLD AUTO-RTO: 0 /100 WBC (ref 0–0.2)
PLATELET # BLD AUTO: 301 10*3/MM3 (ref 140–450)
PMV BLD AUTO: 8.9 FL (ref 6–12)
POTASSIUM SERPL-SCNC: 4.5 MMOL/L (ref 3.5–5.2)
PROT SERPL-MCNC: 7.3 G/DL (ref 6–8.5)
PROTHROMBIN TIME: 14.8 SECONDS (ref 11.8–14.9)
RBC # BLD AUTO: 3.43 10*6/MM3 (ref 4.14–5.8)
RH BLD: POSITIVE
SODIUM SERPL-SCNC: 139 MMOL/L (ref 136–145)
WBC NRBC COR # BLD: 6.61 10*3/MM3 (ref 3.4–10.8)

## 2022-09-27 PROCEDURE — 85025 COMPLETE CBC W/AUTO DIFF WBC: CPT

## 2022-09-27 PROCEDURE — 80053 COMPREHEN METABOLIC PANEL: CPT

## 2022-09-27 PROCEDURE — 86900 BLOOD TYPING SEROLOGIC ABO: CPT

## 2022-09-27 PROCEDURE — 83036 HEMOGLOBIN GLYCOSYLATED A1C: CPT

## 2022-09-27 PROCEDURE — 86901 BLOOD TYPING SEROLOGIC RH(D): CPT

## 2022-09-27 PROCEDURE — 85610 PROTHROMBIN TIME: CPT

## 2022-09-27 PROCEDURE — 36415 COLL VENOUS BLD VENIPUNCTURE: CPT

## 2022-09-27 ASSESSMENT — HOOS JR
HOOS JR SCORE: 9
HOOS JR SCORE: 63.776

## 2022-09-27 NOTE — DISCHARGE INSTRUCTIONS
IMPORTANT INSTRUCTIONS - PRE-ADMISSION TESTING  DO NOT EAT OR CHEW anything after midnight the night before your procedure.    You may have CLEAR liquids up to __3____ hours prior to ARRIVAL time.   Take the following medications the morning of your procedure with JUST A SIP OF WATER:  ____  AMLODIPINE, METOPROLOL, AND ROSUVASTATIN___________________________________________________________________________________________________________________________________________________________________________________    DO NOT BRING your medications to the hospital with you, UNLESS something has changed since your PRE-Admission Testing appointment.  STARTING NOW Hold all vitamins, supplements, and NSAIDS (Non- steroidal anti-inflammatory meds) for one week prior to surgery (you MAY take Tylenol or Acetaminophen).  If you are diabetic, check your blood sugar the morning of your procedure. If it is less than 70 or if you are feeling symptomatic, call the following number for further instructions: [phone]  Use your inhalers/nebulizers as usual, the morning of your procedure. BRING YOUR INHALERS with you.   Bring your CPAP or BIPAP to hospital, ONLY IF YOU WILL BE SPENDING THE NIGHT.   Make sure you have a ride home and have someone who will stay with you the day of your procedure after you go home.  If you have any questions, please call your Pre-Admission Testing Nurse, ___BETTY_____________ at [phone]__.   Per anesthesia request, do not smoke for 24 hours before your procedure or as instructed by your surgeon.    WILL CALL ON 9/30/22 AND GIVE OFFICIAL ARRIVAL TIME FOR DAY OF SURGERY  DRINK 20 OZ GATORADE SUGAR FREE NO RED 3 HOURS PRIOR TO ARRIVAL  REFER TO PAGE 9 IN TOTAL JOINT BOOK FOR BATHING INSTRUCTIONS. NO JEWELRY OF ANY TYPE DAY OF PROCEDURE  LAST DOSE OF ELIQUIS TO BE 9/29/22 PER DR MARCELINO  PERKINS, CHECK, OR CARD FOR MED TO BED IF INDICATED AT DISCHARGE  COME TO SAME AREA HAD PAT [place] ELEVATOR A  3RD FLOOR DAY OF PROCEDURE  ON 10/2/22 NO METFORMIN AFTER 6 PM

## 2022-09-28 ENCOUNTER — ANESTHESIA EVENT (OUTPATIENT)
Dept: PERIOP | Facility: HOSPITAL | Age: 72
End: 2022-09-28

## 2022-09-28 RX ORDER — ROSUVASTATIN CALCIUM 40 MG/1
TABLET, COATED ORAL
Qty: 90 TABLET | Refills: 1 | Status: SHIPPED | OUTPATIENT
Start: 2022-09-28

## 2022-10-01 NOTE — H&P
Chief Complaint  No chief complaint on file.     Subjective      Toñito Kuo presents to [facility] for a follow-up of right hip. Patient has a history of right hip osteoarthritis that he has been treating conservatively. Pain is in the groin. Pain has worsened and he has limited motion of the hip. Patient reports decrease in activities due to pain. Patient has a history of left hip osteoarthritis as well. He states left hip is worse than the right today.     Allergies   Allergen Reactions   • Penicillins Rash        Social History     Socioeconomic History   • Marital status:    Tobacco Use   • Smoking status: Never Smoker   • Smokeless tobacco: Never Used   Vaping Use   • Vaping Use: Never used   Substance and Sexual Activity   • Alcohol use: Yes     Comment: OCC   • Drug use: Never   • Sexual activity: Defer        Review of Systems     Objective   Vital Signs:   There were no vitals taken for this visit.      Physical Exam  Constitutional:       Appearance: Normal appearance. Patient is well-developed and normal weight.   HENT:      Head: Normocephalic.      Right Ear: Hearing and external ear normal.      Left Ear: Hearing and external ear normal.      Nose: Nose normal.   Eyes:      Conjunctiva/sclera: Conjunctivae normal.   Cardiovascular:      Rate and Rhythm: Normal rate.   Pulmonary:      Effort: Pulmonary effort is normal.      Breath sounds: No wheezing or rales.   Abdominal:      Palpations: Abdomen is soft.      Tenderness: There is no abdominal tenderness.   Musculoskeletal:      Cervical back: Normal range of motion.   Skin:     Findings: No rash.   Neurological:      Mental Status: Patient is alert and oriented to person, place, and time.   Psychiatric:         Mood and Affect: Mood and affect normal.         Judgment: Judgment normal.       Ortho Exam      BILATERAL HIP: Tender hip and pelvic muscles. Impingement signs. ER to 5 degrees. IR to 15 degrees. Limping gait.  Groin pain with hip motion. Limited examination due hip pain and lack of motion.     Procedures      Imaging Results (Most Recent)     None           Result Review :         No results found.           Assessment and Plan     Right hip oa    Risks and benefits of right total hip replacement discussed, he wishes to proceed.     Discussed surgery., Risks/benefits discussed with patient including, but not limited to: infection, bleeding, neurovascular damage, malunion, nonunion, aesthetic deformity, need for further surgery, and death., Discussed with patient the implant type being used during surgery and patient understands and desires to proceed. and Surgery pamphlet given.    Follow Up     Post-operatively.     I have personally performed the services described in this document as scribed by the above individual and it is both accurate and complete. Ravinder Hawley MD 10/01/22

## 2022-10-03 ENCOUNTER — APPOINTMENT (OUTPATIENT)
Dept: GENERAL RADIOLOGY | Facility: HOSPITAL | Age: 72
End: 2022-10-03

## 2022-10-03 ENCOUNTER — HOSPITAL ENCOUNTER (OUTPATIENT)
Facility: HOSPITAL | Age: 72
Discharge: HOME HEALTH | End: 2022-10-04
Attending: ORTHOPAEDIC SURGERY | Admitting: ORTHOPAEDIC SURGERY

## 2022-10-03 ENCOUNTER — ANESTHESIA (OUTPATIENT)
Dept: PERIOP | Facility: HOSPITAL | Age: 72
End: 2022-10-03

## 2022-10-03 DIAGNOSIS — N18.31 STAGE 3A CHRONIC KIDNEY DISEASE: Chronic | ICD-10-CM

## 2022-10-03 DIAGNOSIS — Z78.9 DECREASED ACTIVITIES OF DAILY LIVING (ADL): ICD-10-CM

## 2022-10-03 DIAGNOSIS — R26.2 DIFFICULTY IN WALKING: Primary | ICD-10-CM

## 2022-10-03 DIAGNOSIS — M16.11 PRIMARY OSTEOARTHRITIS OF RIGHT HIP: ICD-10-CM

## 2022-10-03 DIAGNOSIS — D64.9 ANEMIA, UNSPECIFIED TYPE: ICD-10-CM

## 2022-10-03 LAB
GLUCOSE BLDC GLUCOMTR-MCNC: 121 MG/DL (ref 70–99)
GLUCOSE BLDC GLUCOMTR-MCNC: 153 MG/DL (ref 70–99)

## 2022-10-03 PROCEDURE — A9270 NON-COVERED ITEM OR SERVICE: HCPCS | Performed by: ANESTHESIOLOGY

## 2022-10-03 PROCEDURE — A9270 NON-COVERED ITEM OR SERVICE: HCPCS | Performed by: ORTHOPAEDIC SURGERY

## 2022-10-03 PROCEDURE — 63710000001 SENNOSIDES-DOCUSATE 8.6-50 MG TABLET: Performed by: ORTHOPAEDIC SURGERY

## 2022-10-03 PROCEDURE — 25010000002 CEFAZOLIN IN DEXTROSE 2-4 GM/100ML-% SOLUTION: Performed by: ORTHOPAEDIC SURGERY

## 2022-10-03 PROCEDURE — 73502 X-RAY EXAM HIP UNI 2-3 VIEWS: CPT

## 2022-10-03 PROCEDURE — 82962 GLUCOSE BLOOD TEST: CPT

## 2022-10-03 PROCEDURE — 27130 TOTAL HIP ARTHROPLASTY: CPT | Performed by: ORTHOPAEDIC SURGERY

## 2022-10-03 PROCEDURE — 0 MEPERIDINE PER 100 MG: Performed by: NURSE ANESTHETIST, CERTIFIED REGISTERED

## 2022-10-03 PROCEDURE — 25010000002 EPINEPHRINE 1 MG/ML SOLUTION: Performed by: ORTHOPAEDIC SURGERY

## 2022-10-03 PROCEDURE — 25010000002 KETOROLAC TROMETHAMINE PER 15 MG: Performed by: ORTHOPAEDIC SURGERY

## 2022-10-03 PROCEDURE — 25010000002 ROPIVACAINE PER 1 MG: Performed by: ORTHOPAEDIC SURGERY

## 2022-10-03 PROCEDURE — 63710000001 CELECOXIB 100 MG CAPSULE: Performed by: ANESTHESIOLOGY

## 2022-10-03 PROCEDURE — 63710000001 FAMOTIDINE 20 MG TABLET: Performed by: ORTHOPAEDIC SURGERY

## 2022-10-03 PROCEDURE — 25010000002 PROPOFOL 10 MG/ML EMULSION: Performed by: NURSE ANESTHETIST, CERTIFIED REGISTERED

## 2022-10-03 PROCEDURE — 94799 UNLISTED PULMONARY SVC/PX: CPT

## 2022-10-03 PROCEDURE — 25010000002 MORPHINE PER 10 MG: Performed by: ORTHOPAEDIC SURGERY

## 2022-10-03 PROCEDURE — 99213 OFFICE O/P EST LOW 20 MIN: CPT | Performed by: INTERNAL MEDICINE

## 2022-10-03 PROCEDURE — 97161 PT EVAL LOW COMPLEX 20 MIN: CPT

## 2022-10-03 PROCEDURE — 94761 N-INVAS EAR/PLS OXIMETRY MLT: CPT

## 2022-10-03 PROCEDURE — 63710000001 ACETAMINOPHEN 500 MG TABLET: Performed by: ORTHOPAEDIC SURGERY

## 2022-10-03 PROCEDURE — 25010000002 MIDAZOLAM PER 1 MG: Performed by: ANESTHESIOLOGY

## 2022-10-03 PROCEDURE — 76000 FLUOROSCOPY <1 HR PHYS/QHP: CPT

## 2022-10-03 PROCEDURE — C1776 JOINT DEVICE (IMPLANTABLE): HCPCS | Performed by: ORTHOPAEDIC SURGERY

## 2022-10-03 PROCEDURE — 63710000001 FERROUS SULFATE 325 (65 FE) MG TABLET: Performed by: ORTHOPAEDIC SURGERY

## 2022-10-03 PROCEDURE — 25010000002 FENTANYL CITRATE (PF) 50 MCG/ML SOLUTION: Performed by: NURSE ANESTHETIST, CERTIFIED REGISTERED

## 2022-10-03 PROCEDURE — 25010000002 HYDROMORPHONE 1 MG/ML SOLUTION: Performed by: NURSE ANESTHETIST, CERTIFIED REGISTERED

## 2022-10-03 PROCEDURE — 63710000001 ACETAMINOPHEN 500 MG TABLET: Performed by: ANESTHESIOLOGY

## 2022-10-03 PROCEDURE — 25010000002 ONDANSETRON PER 1 MG: Performed by: NURSE ANESTHETIST, CERTIFIED REGISTERED

## 2022-10-03 DEVICE — SHLL ACET G7 PPS LTD/HL TI SZE 52MM: Type: IMPLANTABLE DEVICE | Site: HIP | Status: FUNCTIONAL

## 2022-10-03 DEVICE — ADAPT HIP BIOLOX OPTN TYPE1 TPR STD: Type: IMPLANTABLE DEVICE | Site: HIP | Status: FUNCTIONAL

## 2022-10-03 DEVICE — TOTAL HIP PRIMARY: Type: IMPLANTABLE DEVICE | Site: HIP | Status: FUNCTIONAL

## 2022-10-03 DEVICE — HD FEM/HIP G7 BIOLOX/DELTA OPTN 36MM: Type: IMPLANTABLE DEVICE | Site: HIP | Status: FUNCTIONAL

## 2022-10-03 DEVICE — IMPLANTABLE DEVICE: Type: IMPLANTABLE DEVICE | Site: HIP | Status: FUNCTIONAL

## 2022-10-03 DEVICE — LINER ACET G7 VIVACIT/E HI/WL HXPE SZE 36MM: Type: IMPLANTABLE DEVICE | Site: HIP | Status: FUNCTIONAL

## 2022-10-03 DEVICE — SCRW ACET CORT TRILOGY S/TAP 6.5X25: Type: IMPLANTABLE DEVICE | Site: HIP | Status: FUNCTIONAL

## 2022-10-03 RX ORDER — MEPERIDINE HYDROCHLORIDE 25 MG/ML
12.5 INJECTION INTRAMUSCULAR; INTRAVENOUS; SUBCUTANEOUS
Status: DISCONTINUED | OUTPATIENT
Start: 2022-10-03 | End: 2022-10-03 | Stop reason: HOSPADM

## 2022-10-03 RX ORDER — ONDANSETRON 2 MG/ML
4 INJECTION INTRAMUSCULAR; INTRAVENOUS ONCE AS NEEDED
Status: DISCONTINUED | OUTPATIENT
Start: 2022-10-03 | End: 2022-10-03 | Stop reason: HOSPADM

## 2022-10-03 RX ORDER — CEFAZOLIN SODIUM 2 G/100ML
2 INJECTION, SOLUTION INTRAVENOUS ONCE
Status: COMPLETED | OUTPATIENT
Start: 2022-10-03 | End: 2022-10-03

## 2022-10-03 RX ORDER — ACETAMINOPHEN 500 MG
1000 TABLET ORAL EVERY 8 HOURS
Status: DISCONTINUED | OUTPATIENT
Start: 2022-10-03 | End: 2022-10-04 | Stop reason: HOSPADM

## 2022-10-03 RX ORDER — SODIUM CHLORIDE, SODIUM LACTATE, POTASSIUM CHLORIDE, CALCIUM CHLORIDE 600; 310; 30; 20 MG/100ML; MG/100ML; MG/100ML; MG/100ML
100 INJECTION, SOLUTION INTRAVENOUS CONTINUOUS
Status: DISCONTINUED | OUTPATIENT
Start: 2022-10-03 | End: 2022-10-04

## 2022-10-03 RX ORDER — FENTANYL CITRATE 50 UG/ML
INJECTION, SOLUTION INTRAMUSCULAR; INTRAVENOUS AS NEEDED
Status: DISCONTINUED | OUTPATIENT
Start: 2022-10-03 | End: 2022-10-03 | Stop reason: SURG

## 2022-10-03 RX ORDER — TRANEXAMIC ACID 10 MG/ML
1000 INJECTION, SOLUTION INTRAVENOUS ONCE
Status: COMPLETED | OUTPATIENT
Start: 2022-10-03 | End: 2022-10-03

## 2022-10-03 RX ORDER — FERROUS SULFATE 325(65) MG
325 TABLET ORAL
Status: DISCONTINUED | OUTPATIENT
Start: 2022-10-03 | End: 2022-10-04 | Stop reason: HOSPADM

## 2022-10-03 RX ORDER — AMLODIPINE BESYLATE 2.5 MG/1
2.5 TABLET ORAL DAILY
Status: DISCONTINUED | OUTPATIENT
Start: 2022-10-04 | End: 2022-10-04 | Stop reason: HOSPADM

## 2022-10-03 RX ORDER — POVIDONE-IODINE 10 MG/ML
SOLUTION TOPICAL ONCE
Status: COMPLETED | OUTPATIENT
Start: 2022-10-03 | End: 2022-10-03

## 2022-10-03 RX ORDER — ENOXAPARIN SODIUM 100 MG/ML
40 INJECTION SUBCUTANEOUS DAILY
Status: DISCONTINUED | OUTPATIENT
Start: 2022-10-04 | End: 2022-10-04 | Stop reason: HOSPADM

## 2022-10-03 RX ORDER — ONDANSETRON 2 MG/ML
INJECTION INTRAMUSCULAR; INTRAVENOUS AS NEEDED
Status: DISCONTINUED | OUTPATIENT
Start: 2022-10-03 | End: 2022-10-03 | Stop reason: SURG

## 2022-10-03 RX ORDER — PROMETHAZINE HYDROCHLORIDE 12.5 MG/1
12.5 TABLET ORAL EVERY 6 HOURS PRN
Status: DISCONTINUED | OUTPATIENT
Start: 2022-10-03 | End: 2022-10-04 | Stop reason: HOSPADM

## 2022-10-03 RX ORDER — PHENYLEPHRINE HCL IN 0.9% NACL 1 MG/10 ML
SYRINGE (ML) INTRAVENOUS AS NEEDED
Status: DISCONTINUED | OUTPATIENT
Start: 2022-10-03 | End: 2022-10-03 | Stop reason: SURG

## 2022-10-03 RX ORDER — SODIUM CHLORIDE, SODIUM LACTATE, POTASSIUM CHLORIDE, CALCIUM CHLORIDE 600; 310; 30; 20 MG/100ML; MG/100ML; MG/100ML; MG/100ML
9 INJECTION, SOLUTION INTRAVENOUS CONTINUOUS PRN
Status: DISCONTINUED | OUTPATIENT
Start: 2022-10-03 | End: 2022-10-04 | Stop reason: HOSPADM

## 2022-10-03 RX ORDER — AMOXICILLIN 250 MG
2 CAPSULE ORAL 2 TIMES DAILY
Status: DISCONTINUED | OUTPATIENT
Start: 2022-10-03 | End: 2022-10-04 | Stop reason: HOSPADM

## 2022-10-03 RX ORDER — ACETAMINOPHEN 325 MG/1
325 TABLET ORAL EVERY 4 HOURS PRN
Status: DISCONTINUED | OUTPATIENT
Start: 2022-10-03 | End: 2022-10-04 | Stop reason: HOSPADM

## 2022-10-03 RX ORDER — HYDROCODONE BITARTRATE AND ACETAMINOPHEN 7.5; 325 MG/1; MG/1
2 TABLET ORAL EVERY 4 HOURS PRN
Status: DISCONTINUED | OUTPATIENT
Start: 2022-10-03 | End: 2022-10-04 | Stop reason: HOSPADM

## 2022-10-03 RX ORDER — CELECOXIB 100 MG/1
200 CAPSULE ORAL ONCE
Status: COMPLETED | OUTPATIENT
Start: 2022-10-03 | End: 2022-10-03

## 2022-10-03 RX ORDER — ROSUVASTATIN CALCIUM 20 MG/1
40 TABLET, COATED ORAL NIGHTLY
Status: DISCONTINUED | OUTPATIENT
Start: 2022-10-04 | End: 2022-10-04 | Stop reason: HOSPADM

## 2022-10-03 RX ORDER — LIDOCAINE HYDROCHLORIDE 20 MG/ML
INJECTION, SOLUTION EPIDURAL; INFILTRATION; INTRACAUDAL; PERINEURAL AS NEEDED
Status: DISCONTINUED | OUTPATIENT
Start: 2022-10-03 | End: 2022-10-03 | Stop reason: SURG

## 2022-10-03 RX ORDER — KETOROLAC TROMETHAMINE 15 MG/ML
15 INJECTION, SOLUTION INTRAMUSCULAR; INTRAVENOUS EVERY 6 HOURS SCHEDULED
Status: DISCONTINUED | OUTPATIENT
Start: 2022-10-03 | End: 2022-10-04

## 2022-10-03 RX ORDER — CEFAZOLIN SODIUM 2 G/100ML
2 INJECTION, SOLUTION INTRAVENOUS EVERY 8 HOURS
Status: COMPLETED | OUTPATIENT
Start: 2022-10-03 | End: 2022-10-03

## 2022-10-03 RX ORDER — MIDAZOLAM HYDROCHLORIDE 1 MG/ML
2 INJECTION INTRAMUSCULAR; INTRAVENOUS ONCE
Status: COMPLETED | OUTPATIENT
Start: 2022-10-03 | End: 2022-10-03

## 2022-10-03 RX ORDER — PROMETHAZINE HYDROCHLORIDE 25 MG/1
25 SUPPOSITORY RECTAL ONCE AS NEEDED
Status: DISCONTINUED | OUTPATIENT
Start: 2022-10-03 | End: 2022-10-03 | Stop reason: HOSPADM

## 2022-10-03 RX ORDER — PROMETHAZINE HYDROCHLORIDE 12.5 MG/1
25 TABLET ORAL ONCE AS NEEDED
Status: DISCONTINUED | OUTPATIENT
Start: 2022-10-03 | End: 2022-10-03 | Stop reason: HOSPADM

## 2022-10-03 RX ORDER — ACETAMINOPHEN 500 MG
1000 TABLET ORAL ONCE
Status: COMPLETED | OUTPATIENT
Start: 2022-10-03 | End: 2022-10-03

## 2022-10-03 RX ORDER — ROCURONIUM BROMIDE 10 MG/ML
INJECTION, SOLUTION INTRAVENOUS AS NEEDED
Status: DISCONTINUED | OUTPATIENT
Start: 2022-10-03 | End: 2022-10-03 | Stop reason: SURG

## 2022-10-03 RX ORDER — METOPROLOL SUCCINATE 50 MG/1
50 TABLET, EXTENDED RELEASE ORAL DAILY
Status: DISCONTINUED | OUTPATIENT
Start: 2022-10-04 | End: 2022-10-04 | Stop reason: HOSPADM

## 2022-10-03 RX ORDER — BISACODYL 10 MG
10 SUPPOSITORY, RECTAL RECTAL DAILY PRN
Status: DISCONTINUED | OUTPATIENT
Start: 2022-10-03 | End: 2022-10-04 | Stop reason: HOSPADM

## 2022-10-03 RX ORDER — ONDANSETRON 4 MG/1
4 TABLET, FILM COATED ORAL EVERY 6 HOURS PRN
Status: DISCONTINUED | OUTPATIENT
Start: 2022-10-03 | End: 2022-10-04 | Stop reason: HOSPADM

## 2022-10-03 RX ORDER — PROPOFOL 10 MG/ML
VIAL (ML) INTRAVENOUS AS NEEDED
Status: DISCONTINUED | OUTPATIENT
Start: 2022-10-03 | End: 2022-10-03 | Stop reason: SURG

## 2022-10-03 RX ORDER — FAMOTIDINE 20 MG/1
40 TABLET, FILM COATED ORAL DAILY
Status: DISCONTINUED | OUTPATIENT
Start: 2022-10-03 | End: 2022-10-04 | Stop reason: HOSPADM

## 2022-10-03 RX ORDER — PROMETHAZINE HYDROCHLORIDE 12.5 MG/1
12.5 SUPPOSITORY RECTAL EVERY 6 HOURS PRN
Status: DISCONTINUED | OUTPATIENT
Start: 2022-10-03 | End: 2022-10-04 | Stop reason: HOSPADM

## 2022-10-03 RX ORDER — OXYCODONE HYDROCHLORIDE 5 MG/1
5 TABLET ORAL
Status: DISCONTINUED | OUTPATIENT
Start: 2022-10-03 | End: 2022-10-03 | Stop reason: HOSPADM

## 2022-10-03 RX ORDER — ONDANSETRON 2 MG/ML
4 INJECTION INTRAMUSCULAR; INTRAVENOUS EVERY 6 HOURS PRN
Status: DISCONTINUED | OUTPATIENT
Start: 2022-10-03 | End: 2022-10-04 | Stop reason: HOSPADM

## 2022-10-03 RX ORDER — GLYCOPYRROLATE 0.2 MG/ML
0.2 INJECTION INTRAMUSCULAR; INTRAVENOUS
Status: COMPLETED | OUTPATIENT
Start: 2022-10-03 | End: 2022-10-03

## 2022-10-03 RX ORDER — NALOXONE HCL 0.4 MG/ML
0.4 VIAL (ML) INJECTION
Status: DISCONTINUED | OUTPATIENT
Start: 2022-10-03 | End: 2022-10-04 | Stop reason: HOSPADM

## 2022-10-03 RX ORDER — HYDROCODONE BITARTRATE AND ACETAMINOPHEN 7.5; 325 MG/1; MG/1
1 TABLET ORAL EVERY 4 HOURS PRN
Status: DISCONTINUED | OUTPATIENT
Start: 2022-10-03 | End: 2022-10-04 | Stop reason: HOSPADM

## 2022-10-03 RX ADMIN — SODIUM CHLORIDE, POTASSIUM CHLORIDE, SODIUM LACTATE AND CALCIUM CHLORIDE: 600; 310; 30; 20 INJECTION, SOLUTION INTRAVENOUS at 08:10

## 2022-10-03 RX ADMIN — Medication 100 MCG: at 07:31

## 2022-10-03 RX ADMIN — KETOROLAC TROMETHAMINE 15 MG: 15 INJECTION, SOLUTION INTRAMUSCULAR; INTRAVENOUS at 15:21

## 2022-10-03 RX ADMIN — ROCURONIUM BROMIDE 20 MG: 10 INJECTION INTRAVENOUS at 07:45

## 2022-10-03 RX ADMIN — CEFAZOLIN SODIUM 2 G: 2 INJECTION, SOLUTION INTRAVENOUS at 07:08

## 2022-10-03 RX ADMIN — CEFAZOLIN SODIUM 2 G: 2 INJECTION, SOLUTION INTRAVENOUS at 22:50

## 2022-10-03 RX ADMIN — TRANEXAMIC ACID 1000 MG: 10 INJECTION, SOLUTION INTRAVENOUS at 08:32

## 2022-10-03 RX ADMIN — FENTANYL CITRATE 50 MCG: 50 INJECTION, SOLUTION INTRAMUSCULAR; INTRAVENOUS at 07:10

## 2022-10-03 RX ADMIN — SUGAMMADEX 200 MG: 100 INJECTION, SOLUTION INTRAVENOUS at 08:45

## 2022-10-03 RX ADMIN — POVIDONE-IODINE 4 APPLICATION: 10 SOLUTION TOPICAL at 06:47

## 2022-10-03 RX ADMIN — TRANEXAMIC ACID 1000 MG: 10 INJECTION, SOLUTION INTRAVENOUS at 07:03

## 2022-10-03 RX ADMIN — SODIUM CHLORIDE, POTASSIUM CHLORIDE, SODIUM LACTATE AND CALCIUM CHLORIDE 100 ML/HR: 600; 310; 30; 20 INJECTION, SOLUTION INTRAVENOUS at 21:16

## 2022-10-03 RX ADMIN — GLYCOPYRROLATE 0.2 MG: 0.2 INJECTION INTRAMUSCULAR; INTRAVENOUS at 07:01

## 2022-10-03 RX ADMIN — SENNOSIDES AND DOCUSATE SODIUM 2 TABLET: 50; 8.6 TABLET ORAL at 21:17

## 2022-10-03 RX ADMIN — CELECOXIB 200 MG: 100 CAPSULE ORAL at 06:59

## 2022-10-03 RX ADMIN — MIDAZOLAM HYDROCHLORIDE 2 MG: 1 INJECTION, SOLUTION INTRAMUSCULAR; INTRAVENOUS at 07:00

## 2022-10-03 RX ADMIN — SODIUM CHLORIDE, POTASSIUM CHLORIDE, SODIUM LACTATE AND CALCIUM CHLORIDE 100 ML/HR: 600; 310; 30; 20 INJECTION, SOLUTION INTRAVENOUS at 12:09

## 2022-10-03 RX ADMIN — CEFAZOLIN SODIUM 2 G: 2 INJECTION, SOLUTION INTRAVENOUS at 15:22

## 2022-10-03 RX ADMIN — FENTANYL CITRATE 50 MCG: 50 INJECTION, SOLUTION INTRAMUSCULAR; INTRAVENOUS at 08:36

## 2022-10-03 RX ADMIN — FENTANYL CITRATE 50 MCG: 50 INJECTION, SOLUTION INTRAMUSCULAR; INTRAVENOUS at 07:45

## 2022-10-03 RX ADMIN — SODIUM CHLORIDE, POTASSIUM CHLORIDE, SODIUM LACTATE AND CALCIUM CHLORIDE 9 ML/HR: 600; 310; 30; 20 INJECTION, SOLUTION INTRAVENOUS at 07:03

## 2022-10-03 RX ADMIN — LIDOCAINE HYDROCHLORIDE 60 MG: 20 INJECTION, SOLUTION EPIDURAL; INFILTRATION; INTRACAUDAL; PERINEURAL at 07:10

## 2022-10-03 RX ADMIN — ACETAMINOPHEN 1000 MG: 500 TABLET, FILM COATED ORAL at 15:21

## 2022-10-03 RX ADMIN — PROPOFOL 150 MG: 10 INJECTION, EMULSION INTRAVENOUS at 07:10

## 2022-10-03 RX ADMIN — ROCURONIUM BROMIDE 50 MG: 10 INJECTION INTRAVENOUS at 07:10

## 2022-10-03 RX ADMIN — ROCURONIUM BROMIDE 10 MG: 10 INJECTION INTRAVENOUS at 08:18

## 2022-10-03 RX ADMIN — FAMOTIDINE 40 MG: 20 TABLET ORAL at 12:09

## 2022-10-03 RX ADMIN — MEPERIDINE HYDROCHLORIDE 12.5 MG: 25 INJECTION INTRAMUSCULAR; INTRAVENOUS; SUBCUTANEOUS at 09:28

## 2022-10-03 RX ADMIN — ONDANSETRON 4 MG: 2 INJECTION INTRAMUSCULAR; INTRAVENOUS at 08:35

## 2022-10-03 RX ADMIN — ACETAMINOPHEN 1000 MG: 500 TABLET, FILM COATED ORAL at 06:59

## 2022-10-03 RX ADMIN — ACETAMINOPHEN 1000 MG: 500 TABLET, FILM COATED ORAL at 22:50

## 2022-10-03 RX ADMIN — HYDROMORPHONE HYDROCHLORIDE 0.5 MG: 1 INJECTION, SOLUTION INTRAMUSCULAR; INTRAVENOUS; SUBCUTANEOUS at 09:17

## 2022-10-03 RX ADMIN — FERROUS SULFATE TAB 325 MG (65 MG ELEMENTAL FE) 325 MG: 325 (65 FE) TAB at 12:09

## 2022-10-03 NOTE — H&P
[facility]  [facility]   Patient Name: Toñito Kuo  : 1950  MRN: [id number]  Primary Care Physician:  Belinda Bocanegra MD  Date of admission: 10/3/2022    Subjective   Subjective     Chief Complaint: Left hip osteoarthritis  HPI:    Toñito Kuo is a 72 y.o. male with past medical he significant for hypertension, diabetes mellitus type 2, obstructive sleep apnea, coronary disease who is seen postoperatively from a left total hip arthroplasty.  At the time of my evaluation he is without any complaints.    Personal History     Past Medical History:  Hypertension  Diabetes mellitus type 2  Obstructive sleep apnea  Paroxysmal defibrillation  Coronary disease/MI  Dyslipidemia  Osteoarthritis  CKD stage III  GERD  Hearing loss  Basal skin cancer    Past Surgical History:  • APPENDECTOMY            • COLONOSCOPY         diverticulosis   • CORONARY ARTERY BYPASS GRAFT         2011   • HERNIA REPAIR       • TESTICLE SURGERY                Family History:   Reviewed non contributory    Social History:   , denies tobacco, occassional alcohol use, denies IDU    Home Medications:  amLODIPine, apixaban, metFORMIN ER, metoprolol succinate XL, multivitamin with minerals, nitroglycerin, rosuvastatin, spironolactone, triamcinolone, and valsartan-hydrochlorothiazide    Allergies:  Allergies   Allergen Reactions   • Penicillins Rash       Review of Systems   All systems were reviewed and negative except for: As noted in HPI    Objective   Objective     Vitals:   Temp:  [97.2 °F (36.2 °C)-98.1 °F (36.7 °C)] 97.6 °F (36.4 °C)  Heart Rate:  [53-94] 53  Resp:  [14-20] 17  BP: (100-153)/(21-73) 104/48  Flow (L/min):  [3-8] 3    Physical Exam    Constitutional: Awake, alert, sitting up in the chair at bedside in no acute distress   Eyes: Pupils equal, sclerae anicteric, no conjunctival injection   HENT: NCAT, mucous membranes moist, hard of hearing   Neck: Supple, no  thyromegaly, no lymphadenopathy, trachea midline   Respiratory: Clear to auscultation bilaterally, nonlabored respirations    Cardiovascular: Bradycardic S1-S2, no murmurs, rubs, or gallops, palpable pedal pulses bilaterally   Gastrointestinal: Positive bowel sounds, soft, nontender, nondistended   Musculoskeletal: No bilateral ankle edema, no clubbing or cyanosis to extremities   Psychiatric: Appropriate affect, cooperative   Neurologic: Oriented x 3, strength symmetric in all extremities although limited in the left lower extremity secondary to recent surgical intervention, Cranial Nerves grossly intact to confrontation, speech clear   Skin: No rashes     Result Review    Result Review:  I have personally reviewed the results from the time of this admission to 10/3/2022 12:21 EDT and agree with these findings:  [x]  Laboratory  []  Microbiology  [x]  Radiology  []  EKG/Telemetry   []  Cardiology/Vascular   []  Pathology  [x]  Old records  []  Other:      Assessment & Plan   Assessment / Plan     Assessment/Plan:   • Osteoarthritis status post left total hip arthroplasty.  Further management as per orthopedics.  • Diabetes mellitus type 2.  Will order diabetic diet.  Hold metformin during hospitalization.  • Hypertension.  Blood pressure appear to be well controlled at this time.  We will continue patient's oral antihypertensives.  • Obstructive sleep apnea.  Continue home CPAP at bedtime.  • Dyslipidemia.  Continue patient's therapy.  Statin      DVT prophylaxis:  Medical and mechanical DVT prophylaxis orders are present.    CODE STATUS:     Full    Admission Status:  I believe this patient meets outpatient status.    Electronically signed by Aziza Kelley MD, 10/03/22, 12:21 PM EDT.

## 2022-10-03 NOTE — OP NOTE
TOTAL HIP ARTHROPLASTY ANTERIOR  Procedure Report    Patient Name:  Toñito Kuo  YOB: 1950    Date of Surgery:  10/3/2022     Indications:  Advanced hip arthritis, failed conservative care    Pre-op Diagnosis:   Primary osteoarthritis of RIGHT  hip [M16.11]       Post-Op Diagnosis Codes:     * Primary localized osteoarthrosis of the hip, right [M16.11]    Procedure/CPT® Codes:      Procedure(s):  TOTAL HIP ARTHROPLASTY ANTERIOR RIGHT    Staff:  Surgeon(s):  Ravinder Hawley MD         Surgical Approach: Hip Direct Anterior ([place])        Anesthesia: Choice    Estimated Blood Loss: 200ml    Implants:    Implant Name Type Inv. Item Serial No.  Lot No. LRB No. Used Action   SHLL ACET G7 PPS LTD/HL TI ELICEO 52MM - [id number] Implant SHLL ACET G7 PPS LTD/HL TI ELICEO 52MM  [employer] [id number] Right 1 Implanted   SCRW ACET MONISHA TRILOGY S/TAP 6.5X25 - [id number] Implant SCRW ACET MONISHA TRILOGY S/TAP 6.5X25  [employer] [id number] Right 1 Implanted   STEM FEM/HIP TAPERLOC COMPL MICROPLASTY HI/OFFST SZ16 - [id number] Implant STEM FEM/HIP TAPERLOC COMPL MICROPLASTY HI/OFFST SZ16  [employer] [id number] Right 1 Implanted   HD FEM/HIP G7 BIOLOX/DELTA OPTN 36MM - [id number] Implant HD FEM/HIP G7 BIOLOX/DELTA OPTN 36MM  [employer] [id number] Right 1 Implanted   LINER ACET G7 VIVACIT/E HI/WL HXPE ELICEO 36MM - [id number] Implant LINER ACET G7 VIVACIT/E HI/WL HXPE ELICEO 36MM  [employer] [id number] Right 1 Implanted   ADAPT HIP BIOLOX OPTN TYPE1 TPR STD - [id number] Implant ADAPT HIP BIOLOX OPTN TYPE1 TPR STD  [employer] [id number] Right 1 Implanted       Specimen:          None        Findings: Advanced arthritis    Complications:  None    Description of Procedure: The patient went to the operating room and  underwent anesthesia, received a preoperative antibiotic, was placed on the Townley table and was then prepped and draped in standard fashion. A standard anterior hip incision was made. The interval  was found and retractors were placed. The capsule was then opened. The femoral neck was identified. Retractors were placed around the neck. The femoral neck cut was then made and then the head was removed from the acetabulum. Acetabular retractors were then placed. The labrum was removed. C-arm fluoroscopy was used to help guide the reaming for the acetabulum. This was sequentially reamed and then the appropriate size shell was then inserted, followed by a screw and then the  liner. The bed was raised, the leg was dropped, and femoral exposure was obtained. This was then opened using a rat-tail reamer and then sequentially broached  and then a stem was inserted. Leg lengths were measured after reduction and a ceramic head was then chosen. This was then thoroughly irrigated, tranexamic acid and local were injected, and then closed using #1 Vicryl, 2-0 Vicryl and staples. Sterile dressing was applied. The patient was then taken to recovery in stable condition. There were no complications.          Ravinder Hawley MD     Date: 10/3/2022  Time: 08:49 EDT

## 2022-10-03 NOTE — ANESTHESIA PREPROCEDURE EVALUATION
Anesthesia Evaluation     Patient summary reviewed and Nursing notes reviewed   NPO Solid Status: > 6 hours  NPO Liquid Status: > 6 hours           Airway   Mallampati: III  TM distance: <3 FB  Neck ROM: limited  Possible difficult intubation  Dental      Pulmonary - normal exam   (+) sleep apnea,   Cardiovascular - normal exam  Exercise tolerance: good (4-7 METS)    (+) hypertension, past MI , CAD, CABG, dysrhythmias, hyperlipidemia,       Neuro/Psych  GI/Hepatic/Renal/Endo    (+) obesity,  GERD,  renal disease, diabetes mellitus,     Musculoskeletal     Abdominal    Substance History      OB/GYN          Other                        Anesthesia Plan    ASA 3     general and ERAS Protocol     Reason for not using neuraxial anesthesia or peripheral nerve block: Patient Preference  intravenous induction     Anesthetic plan, risks, benefits, and alternatives have been provided, discussed and informed consent has been obtained with: patient.        CODE STATUS:

## 2022-10-03 NOTE — PLAN OF CARE
Goal Outcome Evaluation:   Pt stable since arrival to floor, VSS, voiding without difficulty. Ambulated in hallway with PT today, no complaint of pain. Dressing clean, dry and intact. Anticipate discharge home with home health tomorrow.

## 2022-10-03 NOTE — ANESTHESIA POSTPROCEDURE EVALUATION
Patient: Toñito Kuo    Procedure Summary     Date: 10/03/22 Room / Location: [facility] OR 03 / [facility] MAIN OR    Anesthesia Start: 0707 Anesthesia Stop: 0859    Procedure: TOTAL HIP ARTHROPLASTY ANTERIOR RIGHT (Right Hip) Diagnosis:       Primary localized osteoarthrosis of the hip, right      (Primary osteoarthritis of RIGHT  hip [M16.11])    Surgeons: Ravinder Hawley MD Provider: Kiko Almazan MD    Anesthesia Type: general, ERAS Protocol ASA Status: 3          Anesthesia Type: general, ERAS Protocol    Vitals  Vitals Value Taken Time   /42 10/03/22 0944   Temp 36.4 °C (97.6 °F) 10/03/22 0859   Pulse 72 10/03/22 0947   Resp 14 10/03/22 0939   SpO2 97 % 10/03/22 0947   Vitals shown include unvalidated device data.        Post Anesthesia Care and Evaluation    Patient location during evaluation: bedside  Patient participation: complete - patient participated  Level of consciousness: awake  Pain management: adequate    Airway patency: patent  Anesthetic complications: No anesthetic complications  PONV Status: none  Cardiovascular status: acceptable and stable  Respiratory status: acceptable  Hydration status: acceptable    Comments: An Anesthesiologist personally participated in the most demanding procedures (including induction and emergence if applicable) in the anesthesia plan, monitored the course of anesthesia administration at frequent intervals and remained physically present and available for immediate diagnosis and treatment of emergencies.

## 2022-10-03 NOTE — THERAPY EVALUATION
Acute Care - Physical Therapy Initial Evaluation  KHANG Chaparro     Patient Name: Toñito Kuo  : 1950  MRN: [id number]  Today's Date: 10/3/2022      Admit date: 10/3/2022     Referring Physician: Aziza Kelley, *     Surgery Date:10/3/2022   Procedure(s) (LRB):  TOTAL HIP ARTHROPLASTY ANTERIOR RIGHT (Right)         Visit Dx:     ICD-10-CM ICD-9-CM   1. Difficulty in walking  R26.2 719.7   2. Primary osteoarthritis of right hip  M16.11 715.15     Patient Active Problem List   Diagnosis   • Atherosclerosis of coronary artery bypass graft of native heart without angina pectoris   • Hyperlipemia   • Hypertension   • Paroxysmal atrial fibrillation (HCC)   • Pedal edema   • Stage 3a chronic kidney disease (HCC)   • Anemia   • RAUL on CPAP   • Type 2 diabetes mellitus with hyperglycemia, without long-term current use of insulin (HCC)   • Class 1 obesity   • OA (osteoarthritis) of hip   • Primary localized osteoarthrosis of the hip, left     Past Medical History:   Diagnosis Date   • Bladder disease    • CAD (coronary artery disease) 2016    hx    • Cancer (HCC)     SKIN CANCER BASAL LEFT EYE AND EAR  AND SQAMOUS CELL RIGHT SIDE TORSO   • Chest pain     DENIES ANY RECENT CP/SOA. FOLLOWED BY DR MARCELINO AND TRANSITION TO DR WHIPPLE. REPORTS HAS 35 ACRES OF LAND AND WORKS ON IT   • Coronary atherosclerosis of native coronary vessel 2015   • Diabetes mellitus (HCC)     type 2. DOES NOT CHECK BS DAILY   • GERD (gastroesophageal reflux disease)    • Hard of hearing     HEARS BETTER OUT OF RIGHT EAR. BILATERAL HEARING AID   • Heart attack (HCC)    • HTN (hypertension)    • Hyperlipemia    • Osteoarthritis     BILATERAL HIPS   • Paroxysmal atrial fibrillation (HCC) 2021   • Seasonal allergies    • Sleep apnea     over 10 yrs ago   • Stage 3a chronic kidney disease (HCC) 2021     Past Surgical History:   Procedure Laterality Date   • APPENDECTOMY         • COLONOSCOPY      diverticulosis   •  CORONARY ARTERY BYPASS GRAFT      2/2011   • HERNIA REPAIR     • TESTICLE SURGERY  1974     PT Assessment (last 12 hours)     PT Evaluation and Treatment     Row Name 10/03/22 1100          Physical Therapy Time and Intention    Subjective Information no complaints  -FERNANDA     Document Type evaluation  -FERNANDA     Mode of Treatment individual therapy;physical therapy  -FERNANDA     Patient Effort good  -FERNANDA     Row Name 10/03/22 1100          General Information    Patient Observations alert;cooperative;agree to therapy  -FERNANDA     Prior Level of Function independent:;all household mobility;community mobility  -FERNANDA     Equipment Currently Used at Home none  -FERNANDA     Existing Precautions/Restrictions fall;weight bearing  -FERNANDA     Barriers to Rehab none identified  -FERNANDA     Row Name 10/03/22 1100          Living Environment    Current Living Arrangements home  -FERNANDA     People in Home spouse  -FERNANDA     Row Name 10/03/22 1100          Range of Motion (ROM)    Range of Motion ROM is WFL  -FERNANDA     Row Name 10/03/22 1100          Strength (Manual Muscle Testing)    Strength (Manual Muscle Testing) right lower extremity  3+/5  -FERNANDA     Row Name 10/03/22 1100          Mobility    Extremity Weight-bearing Status right lower extremity  -FERNANDA     Right Lower Extremity (Weight-bearing Status) weight-bearing as tolerated (WBAT)  -FERNANDA     Row Name 10/03/22 1100          Bed Mobility    Bed Mobility bed mobility (all) activities;supine-sit  -FERNANDA     All Activities, [place] (Bed Mobility) minimum assist (75% patient effort)  -FERNANDA     Supine-Sit [place] (Bed Mobility) minimum assist (75% patient effort)  -FERNANDA     Row Name 10/03/22 1100          Transfers    Transfers bed-chair transfer;sit-stand transfer  -FERNANDA     Bed-Chair [place] (Transfers) minimum assist (75% patient effort)  -FERNANDA     Assistive Device (Bed-Chair Transfers) walker, front-wheeled  -FERNANDA     Sit-Stand [place] (Transfers) contact guard  -FERNANDA     Row Name 10/03/22 1100           Sit-Stand Transfer    Assistive Device (Sit-Stand Transfers) walker, front-wheeled  -FERNANDA     Row Name 10/03/22 1100          Gait/Stairs (Locomotion)    Gait/Stairs Locomotion gait/ambulation assistive device  -FERNANDA     [place] Level (Gait) minimum assist (75% patient effort)  -FERNANDA     Assistive Device (Gait) walker, front-wheeled  -FERNANDA     Ambulated day of surgery or within 4 hours of PACU discharge yes  -FERNANDA     Distance in Feet (Gait) 25  -FERNANDA     Pattern (Gait) step-to  -FERNANDA     Row Name 10/03/22 1100          Safety Issues, Functional Mobility    Impairments Affecting Function (Mobility) balance;pain;strength  -FERNANDA     Row Name 10/03/22 1100          Balance    Balance Assessment standing dynamic balance  -FERNANDA     Dynamic Standing Balance contact guard  -FERNANDA     Position/Device Used, Standing Balance walker, front-wheeled  -FERNANDA     Row Name             Wound 10/03/22 0743 Right anterior greater trochanter Incision    Wound - Properties Group Placement Date: 10/03/22  -LB Placement Time: 0743  -LB Side: Right  -LB Orientation: anterior  -LB Location: greater trochanter  -LB Primary Wound Type: Incision  -LB     Retired Wound - Properties Group Placement Date: 10/03/22  -LB Placement Time: 0743  -LB Side: Right  -LB Orientation: anterior  -LB Location: greater trochanter  -LB Primary Wound Type: Incision  -LB     Retired Wound - Properties Group Date first assessed: 10/03/22  -LB Time first assessed: 0743  -LB Side: Right  -LB Location: greater trochanter  -LB Primary Wound Type: Incision  -LB     Row Name 10/03/22 1100          Plan of Care Review    Plan of Care Reviewed With patient  -FERNANDA     Outcome Evaluation Patient presents with decreased strength, transfers and ambulation.  Skilled physical therapy services will be required to address these mobility deficits.  Recommend follow-up with outpatient physical therapy services upon discharge from the hospital  -FERNANDA     Row Name 10/03/22 1100          Therapy  Assessment/Plan (PT)    Patient/Family Therapy Goals Statement (PT) Patient wants to be able to travel  -FERNANDA     Rehab Potential (PT) good, to achieve stated therapy goals  -FERNANDA     Criteria for Skilled Interventions Met (PT) skilled treatment is necessary  -FERNANDA     Therapy Frequency (PT) 2 times/day  -FERNANDA     Predicted Duration of Therapy Intervention (PT) 10 days  -FERNANDA     Problem List (PT) problems related to;balance;strength;pain;mobility  -FERNANDA     Activity Limitations Related to Problem List (PT) unable to ambulate safely;unable to transfer safely  -FERNANDA     Row Name 10/03/22 1100          PT Evaluation Complexity    History, PT Evaluation Complexity no personal factors and/or comorbidities  -FERNANDA     Examination of Body Systems (PT Eval Complexity) total of 4 or more elements  -FERNANDA     Clinical Presentation (PT Evaluation Complexity) stable  -FERNANDA     Clinical Decision Making (PT Evaluation Complexity) low complexity  -FERNANDA     Overall Complexity (PT Evaluation Complexity) low complexity  -FERNANDA     Row Name 10/03/22 1100          Therapy Plan Review/Discharge Plan (PT)    Therapy Plan Review (PT) evaluation/treatment results reviewed;participants voiced agreement with care plan;participants included;patient  -FERNANDA     Row Name 10/03/22 1100          Physical Therapy Goals    Transfer Goal Selection (PT) transfer, PT goal 1  -FERNANDA     Gait Training Goal Selection (PT) gait training, PT goal 1  -FERNANDA     Row Name 10/03/22 1100          Transfer Goal 1 (PT)    Activity/Assistive Device (Transfer Goal 1, PT) transfers, all  -FERNANDA     [place] Level/Cues Needed (Transfer Goal 1, PT) independent  -FERNANDA     Time Frame (Transfer Goal 1, PT) long term goal (LTG);10 days  -FERNANDA     Row Name 10/03/22 1100          Gait Training Goal 1 (PT)    Activity/Assistive Device (Gait Training Goal 1, PT) gait (walking locomotion);assistive device use;walker, rolling  -FERNANDA     [place] Level (Gait Training Goal 1, PT) independent  -FERNANDA     Distance (Gait  Training Goal 1, PT) 300  -FERNANDA     Time Frame (Gait Training Goal 1, PT) long term goal (LTG);10 days  -FERNANDA           User Key  (r) = Recorded By, (t) = Taken By, (c) = Cosigned By    Initials Name Provider Type    Clark Rice, RN Registered Nurse    Mayco Birch PT Physical Therapist                Physical Therapy Education                 Title: PT OT SLP Therapies (Done)     Topic: Physical Therapy (Done)     Point: Mobility training (Done)     Learning Progress Summary           Patient Acceptance, E,TB, VU by FERNANDA at 10/3/2022 1152                   Point: Precautions (Done)     Learning Progress Summary           Patient Acceptance, E,TB, VU by FERNANDA at 10/3/2022 1152                               User Key     Initials Effective Dates Name Provider Type Discipline    FERNANDA 06/03/21 -  Mayco Kim PT Physical Therapist PT              PT Recommendation and Plan  Anticipated Discharge Disposition (PT): home with outpatient therapy services  Planned Therapy Interventions (PT): balance training, bed mobility training, gait training, home exercise program, strengthening, stair training, transfer training  Therapy Frequency (PT): 2 times/day  Plan of Care Reviewed With: patient  Outcome Evaluation: Patient presents with decreased strength, transfers and ambulation.  Skilled physical therapy services will be required to address these mobility deficits.  Recommend follow-up with outpatient physical therapy services upon discharge from the hospital   Outcome Measures     Row Name 10/03/22 1100             How much help from another person do you currently need...    Turning from your back to your side while in flat bed without using bedrails? 3  -FERNANDA      Moving from lying on back to sitting on the side of a flat bed without bedrails? 3  -FERNANDA      Moving to and from a bed to a chair (including a wheelchair)? 3  -FERNANDA      Standing up from a chair using your arms (e.g., wheelchair, bedside chair)? 3  -FERNANDA       Climbing 3-5 steps with a railing? 2  -FERNANDA      To walk in hospital room? 3  -FERNANDA      AM-PAC 6 Clicks Score (PT) 17  -FERNANDA              Functional Assessment    Outcome Measure Options AM-PAC 6 Clicks Basic Mobility (PT)  -FERNANDA            User Key  (r) = Recorded By, (t) = Taken By, (c) = Cosigned By    Initials Name Provider Type    Mayco Birch, PT Physical Therapist                 Time Calculation:    PT Charges     Row Name 10/03/22 1147             Time Calculation    PT Received On 10/03/22  -FERNANDA      PT Goal Re-Cert Due Date 10/12/22  -FERNANDA              Untimed Charges    PT Eval/Re-eval Minutes 30  -FERNANDA              Total Minutes    Untimed Charges Total Minutes 30  -FERNANDA       Total Minutes 30  -FERNANDA            User Key  (r) = Recorded By, (t) = Taken By, (c) = Cosigned By    Initials Name Provider Type    Mayco Birch, PT Physical Therapist              Therapy Charges for Today     Code Description Service Date Service Provider Modifiers Qty    64056742123 HC PT EVAL LOW COMPLEXITY 3 10/3/2022 Mayco Kim, PT GP 1          PT G-Codes  Outcome Measure Options: AM-PAC 6 Clicks Basic Mobility (PT)  AM-PAC 6 Clicks Score (PT): Asia Kim, JARETH  10/3/2022

## 2022-10-04 VITALS
HEIGHT: 72 IN | WEIGHT: 254.85 LBS | OXYGEN SATURATION: 98 % | DIASTOLIC BLOOD PRESSURE: 46 MMHG | BODY MASS INDEX: 34.52 KG/M2 | TEMPERATURE: 98.5 F | RESPIRATION RATE: 16 BRPM | SYSTOLIC BLOOD PRESSURE: 127 MMHG | HEART RATE: 68 BPM

## 2022-10-04 PROBLEM — D62 POSTOPERATIVE ANEMIA DUE TO ACUTE BLOOD LOSS: Status: ACTIVE | Noted: 2022-10-04

## 2022-10-04 PROBLEM — Z96.641 STATUS POST RIGHT HIP REPLACEMENT: Status: RESOLVED | Noted: 2022-10-04 | Resolved: 2022-10-04

## 2022-10-04 PROBLEM — Z96.641 STATUS POST RIGHT HIP REPLACEMENT: Status: ACTIVE | Noted: 2022-10-04

## 2022-10-04 LAB
ANION GAP SERPL CALCULATED.3IONS-SCNC: 9.3 MMOL/L (ref 5–15)
BUN SERPL-MCNC: 41 MG/DL (ref 8–23)
BUN/CREAT SERPL: 22.9 (ref 7–25)
CALCIUM SPEC-SCNC: 8.3 MG/DL (ref 8.6–10.5)
CHLORIDE SERPL-SCNC: 101 MMOL/L (ref 98–107)
CO2 SERPL-SCNC: 23.7 MMOL/L (ref 22–29)
CREAT SERPL-MCNC: 1.79 MG/DL (ref 0.76–1.27)
EGFRCR SERPLBLD CKD-EPI 2021: 39.3 ML/MIN/1.73
GLUCOSE SERPL-MCNC: 138 MG/DL (ref 65–99)
HCT VFR BLD AUTO: 25 % (ref 37.5–51)
HGB BLD-MCNC: 7.9 G/DL (ref 13–17.7)
POTASSIUM SERPL-SCNC: 4.9 MMOL/L (ref 3.5–5.2)
SODIUM SERPL-SCNC: 134 MMOL/L (ref 136–145)

## 2022-10-04 PROCEDURE — 63710000001 METOPROLOL SUCCINATE XL 50 MG TABLET SUSTAINED-RELEASE 24 HOUR: Performed by: INTERNAL MEDICINE

## 2022-10-04 PROCEDURE — 97116 GAIT TRAINING THERAPY: CPT

## 2022-10-04 PROCEDURE — A9270 NON-COVERED ITEM OR SERVICE: HCPCS | Performed by: ORTHOPAEDIC SURGERY

## 2022-10-04 PROCEDURE — 63710000001 AMLODIPINE 2.5 MG TABLET: Performed by: INTERNAL MEDICINE

## 2022-10-04 PROCEDURE — 25010000002 KETOROLAC TROMETHAMINE PER 15 MG: Performed by: ORTHOPAEDIC SURGERY

## 2022-10-04 PROCEDURE — 85018 HEMOGLOBIN: CPT | Performed by: ORTHOPAEDIC SURGERY

## 2022-10-04 PROCEDURE — 85014 HEMATOCRIT: CPT | Performed by: ORTHOPAEDIC SURGERY

## 2022-10-04 PROCEDURE — 97150 GROUP THERAPEUTIC PROCEDURES: CPT

## 2022-10-04 PROCEDURE — 97530 THERAPEUTIC ACTIVITIES: CPT

## 2022-10-04 PROCEDURE — A9270 NON-COVERED ITEM OR SERVICE: HCPCS | Performed by: INTERNAL MEDICINE

## 2022-10-04 PROCEDURE — 63710000001 SENNOSIDES-DOCUSATE 8.6-50 MG TABLET: Performed by: ORTHOPAEDIC SURGERY

## 2022-10-04 PROCEDURE — 63710000001 FERROUS SULFATE 325 (65 FE) MG TABLET: Performed by: ORTHOPAEDIC SURGERY

## 2022-10-04 PROCEDURE — 97535 SELF CARE MNGMENT TRAINING: CPT

## 2022-10-04 PROCEDURE — 97165 OT EVAL LOW COMPLEX 30 MIN: CPT

## 2022-10-04 PROCEDURE — 25010000002 ENOXAPARIN PER 10 MG: Performed by: ORTHOPAEDIC SURGERY

## 2022-10-04 PROCEDURE — 63710000001 FAMOTIDINE 20 MG TABLET: Performed by: ORTHOPAEDIC SURGERY

## 2022-10-04 PROCEDURE — 80048 BASIC METABOLIC PNL TOTAL CA: CPT | Performed by: ORTHOPAEDIC SURGERY

## 2022-10-04 RX ORDER — HYDROCODONE BITARTRATE AND ACETAMINOPHEN 7.5; 325 MG/1; MG/1
1-2 TABLET ORAL EVERY 4 HOURS PRN
Qty: 40 TABLET | Refills: 0 | Status: SHIPPED | OUTPATIENT
Start: 2022-10-04 | End: 2022-10-13 | Stop reason: SDUPTHER

## 2022-10-04 RX ADMIN — FERROUS SULFATE TAB 325 MG (65 MG ELEMENTAL FE) 325 MG: 325 (65 FE) TAB at 08:16

## 2022-10-04 RX ADMIN — AMLODIPINE BESYLATE 2.5 MG: 2.5 TABLET ORAL at 08:15

## 2022-10-04 RX ADMIN — SENNOSIDES AND DOCUSATE SODIUM 2 TABLET: 50; 8.6 TABLET ORAL at 08:16

## 2022-10-04 RX ADMIN — METOPROLOL SUCCINATE 50 MG: 50 TABLET, EXTENDED RELEASE ORAL at 08:15

## 2022-10-04 RX ADMIN — FAMOTIDINE 40 MG: 20 TABLET ORAL at 08:16

## 2022-10-04 RX ADMIN — KETOROLAC TROMETHAMINE 15 MG: 15 INJECTION, SOLUTION INTRAMUSCULAR; INTRAVENOUS at 00:28

## 2022-10-04 RX ADMIN — KETOROLAC TROMETHAMINE 15 MG: 15 INJECTION, SOLUTION INTRAMUSCULAR; INTRAVENOUS at 06:05

## 2022-10-04 RX ADMIN — ENOXAPARIN SODIUM 40 MG: 100 INJECTION SUBCUTANEOUS at 08:16

## 2022-10-04 NOTE — THERAPY TREATMENT NOTE
Acute Care - Physical Therapy Treatment Note  KHANG Chaparro     Patient Name: Toñito Kuo  : 1950  MRN: [id number]  Today's Date: 10/4/2022 Gait- individual; ther- ex -group setting; 4 participants         Visit Dx:     ICD-10-CM ICD-9-CM   1. Difficulty in walking  R26.2 719.7   2. Primary osteoarthritis of right hip  M16.11 715.15   3. Decreased activities of daily living (ADL)  Z78.9 V49.89   4. Stage 3a chronic kidney disease (HCC)  N18.31 585.3   5. Anemia, unspecified type  D64.9 285.9     Patient Active Problem List   Diagnosis   • Atherosclerosis of coronary artery bypass graft of native heart without angina pectoris   • Hyperlipemia   • Hypertension   • Paroxysmal atrial fibrillation (HCC)   • Pedal edema   • Stage 3a chronic kidney disease (HCC)   • Anemia   • RAUL on CPAP   • Type 2 diabetes mellitus with hyperglycemia, without long-term current use of insulin (HCC)   • Class 1 obesity   • OA (osteoarthritis) of hip   • Primary localized osteoarthrosis of the hip, left     Past Medical History:   Diagnosis Date   • Bladder disease    • CAD (coronary artery disease) 2016    hx    • Cancer (HCC)     SKIN CANCER BASAL LEFT EYE AND EAR  AND SQAMOUS CELL RIGHT SIDE TORSO   • Chest pain     DENIES ANY RECENT CP/SOA. FOLLOWED BY DR MARCELINO AND TRANSITION TO DR WHIPPLE. REPORTS HAS 35 ACRES OF LAND AND WORKS ON IT   • Coronary atherosclerosis of native coronary vessel 2015   • Diabetes mellitus (HCC)     type 2. DOES NOT CHECK BS DAILY   • GERD (gastroesophageal reflux disease)    • Hard of hearing     HEARS BETTER OUT OF RIGHT EAR. BILATERAL HEARING AID   • Heart attack (HCC)    • HTN (hypertension)    • Hyperlipemia    • Osteoarthritis     BILATERAL HIPS   • Paroxysmal atrial fibrillation (HCC) 2021   • Seasonal allergies    • Sleep apnea     over 10 yrs ago   • Stage 3a chronic kidney disease (HCC) 2021     Past Surgical History:   Procedure Laterality Date   • APPENDECTOMY          • COLONOSCOPY      diverticulosis   • CORONARY ARTERY BYPASS GRAFT      2/2011   • HERNIA REPAIR     • TESTICLE SURGERY  1974   • TOTAL HIP ARTHROPLASTY Right 10/3/2022    Procedure: TOTAL HIP ARTHROPLASTY ANTERIOR RIGHT;  Surgeon: Ravinder Hawley MD;  Location: [facility] MAIN OR;  Service: Orthopedics;  Laterality: Right;     PT Assessment (last 12 hours)     PT Evaluation and Treatment     Row Name 10/04/22 1232          Physical Therapy Time and Intention    Subjective Information no complaints  -     Document Type therapy note (daily note)  -     Mode of Treatment physical therapy;group therapy;individual therapy  -     Patient Effort good  -     Row Name 10/04/22 1232          Pain    Additional Documentation Pain Scale: FACES Pre/Post-Treatment (Group)  -     Row Name 10/04/22 1232          Pain Scale: FACES Pre/Post-Treatment    Pain: FACES Scale, Pretreatment 0-->no hurt  -     Posttreatment Pain Rating 2-->hurts little bit  -     Pain Location - Side/Orientation Right  -     Pain Location - hip  -     Row Name 10/04/22 1232          Range of Motion (ROM)    Range of Motion --  Pt R hip AAROM at 90 degrees flex and 25 degrees abd.  -     Row Name 10/04/22 1232          Strength (Manual Muscle Testing)    Strength (Manual Muscle Testing) --  Pt R hip flex strength at 3-/5.  -     Row Name 10/04/22 1232          Mobility    Extremity Weight-bearing Status right lower extremity  -     Right Lower Extremity (Weight-bearing Status) weight-bearing as tolerated (WBAT)  -     Row Name 10/04/22 1232          Transfers    Transfers sit-stand transfer;stand-sit transfer  -     Stand-Sit [place] (Transfers) contact guard  -     Row Name 10/04/22 1232          Sit-Stand Transfer    Assistive Device (Sit-Stand Transfers) walker, front-wheeled  -     Row Name 10/04/22 1232          Stand-Sit Transfer    Assistive Device (Stand-Sit Transfers) walker, front-wheeled  -     Row Name  10/04/22 1232          Gait/Stairs (Locomotion)    Gait/Stairs Locomotion gait/ambulation independence;gait/ambulation assistive device;distance ambulated;gait pattern;gait deviations  -RH     [place] Level (Gait) contact guard  -RH     Assistive Device (Gait) walker, front-wheeled  -RH     Distance in Feet (Gait) 165  -RH     Pattern (Gait) 3-point;step-through  -RH     Deviations/Abnormal Patterns (Gait) antalgic;gait speed decreased;stride length decreased  -RH     Right Sided Gait Deviations heel strike decreased  -RH     Negotiation (Stairs) stairs independence;stairs assistive device;handrail location;number of steps;ascending technique;descending technique  -RH     [place] Level (Stairs) contact guard  -RH     Handrail Location (Stairs) both sides  -RH     Number of Steps (Stairs) 5 x 2  -RH     Ascending Technique (Stairs) step-to-step  -RH     Descending Technique (Stairs) step-to-step  -RH     Row Name 10/04/22 1232          Balance    Dynamic Standing Balance contact guard  -RH     Position/Device Used, Standing Balance walker, front-wheeled  -RH     Row Name             Wound 10/03/22 0743 Right anterior greater trochanter Incision    Wound - Properties Group Placement Date: 10/03/22  -LB Placement Time: 0743  -LB Side: Right  -LB Orientation: anterior  -LB Location: greater trochanter  -LB Primary Wound Type: Incision  -LB     Retired Wound - Properties Group Placement Date: 10/03/22  -LB Placement Time: 0743  -LB Side: Right  -LB Orientation: anterior  -LB Location: greater trochanter  -LB Primary Wound Type: Incision  -LB     Retired Wound - Properties Group Date first assessed: 10/03/22  -LB Time first assessed: 0743  -LB Side: Right  -LB Location: greater trochanter  -LB Primary Wound Type: Incision  -LB     Row Name 10/04/22 1232          Vital Signs    O2 Delivery Intra Treatment room air  -RH     Row Name 10/04/22 1232          Progress Summary (PT)    Progress Toward Functional Goals  (PT) progress toward functional goals is good  -RH           User Key  (r) = Recorded By, (t) = Taken By, (c) = Cosigned By    Initials Name Provider Type    LB Clark Powell, RN Registered Nurse    RH Denzel Valdivia, LEX Physical Therapist Assistant               Right Hip Ther -ex   Exercise  Reps  Sets    Long arc quads   10 2   Short arc quads  10 2   Heel slides  10 2   Ankle pumps  10 2   Quad sets  10 2   Glut sets  10 2   Abduction/ Adduction  10 2        Physical Therapy Education                 Title: PT OT SLP Therapies (Resolved)     Topic: Physical Therapy (Resolved)     Point: Mobility training (Resolved)     Learning Progress Summary           Patient Acceptance, D,E, DU by PG at 10/4/2022 0912    Acceptance, E,TB, VU by FERNANDA at 10/3/2022 1152                   Point: Precautions (Resolved)     Learning Progress Summary           Patient Acceptance, D,E, DU by PG at 10/4/2022 0912    Acceptance, E,TB, VU by FERNANDA at 10/3/2022 1152                               User Key     Initials Effective Dates Name Provider Type Discipline    PG 06/16/21 -  Rick Kimbrough, OT Occupational Therapist OT    FERNANDA 06/03/21 -  Mayco Kim PT Physical Therapist PT              PT Recommendation and Plan     Progress Summary (PT)  Progress Toward Functional Goals (PT): progress toward functional goals is good   Outcome Measures     Row Name 10/04/22 1200 10/03/22 1100          How much help from another person do you currently need...    Turning from your back to your side while in flat bed without using bedrails? 4  -RH 3  -FERNANDA     Moving from lying on back to sitting on the side of a flat bed without bedrails? 4  -RH 3  -FERNANDA     Moving to and from a bed to a chair (including a wheelchair)? 4  -RH 3  -FERNANDA     Standing up from a chair using your arms (e.g., wheelchair, bedside chair)? 4  -RH 3  -FERNANDA     Climbing 3-5 steps with a railing? 4  -RH 2  -FERNANDA     To walk in hospital room? 4  -RH 3  -FERNANDA     AM-PAC 6 Clicks Score (PT)  24  -RH 17  -FERNANDA            Functional Assessment    Outcome Measure Options -- AM-PAC 6 Clicks Basic Mobility (PT)  -FERNANDA           User Key  (r) = Recorded By, (t) = Taken By, (c) = Cosigned By    Initials Name Provider Type     Denzel Valdivia PTA Physical Therapist Assistant    Mayco Birch, PT Physical Therapist                 Time Calculation:    PT Charges     Row Name 10/04/22 1231             Time Calculation    PT Received On 10/04/22  -RH              Timed Charges    59933 - Gait Training Minutes  9  -RH      77717 - PT Therapeutic Activity Minutes 3  -RH              Untimed Charges    PT Group Therapy Minutes 30  -RH              Total Minutes    Timed Charges Total Minutes 12  -RH      Untimed Charges Total Minutes 30  -RH       Total Minutes 42  -RH            User Key  (r) = Recorded By, (t) = Taken By, (c) = Cosigned By    Initials Name Provider Type     Denzel Valdivia PTA Physical Therapist Assistant              Therapy Charges for Today     Code Description Service Date Service Provider Modifiers Qty    88519456560 HC GAIT TRAINING EA 15 MIN 10/4/2022 Denzel Valdivia PTA GP 1    72941546006 HC PT THER PROC GROUP 10/4/2022 Denzel Valdivia PTA GP 1          PT G-Codes  Outcome Measure Options: AM-PAC 6 Clicks Daily Activity (OT), Optimal Instrument  AM-PAC 6 Clicks Score (PT): 24  AM-PAC 6 Clicks Score (OT): 19    Denzel Valdivia PTA  10/4/2022

## 2022-10-04 NOTE — DISCHARGE SUMMARY
[facility]         HOSPITALIST  DISCHARGE SUMMARY    Patient Name: Toñito Kuo  : 1950  MRN: [id number]    Date of Admission: 10/3/2022  Date of Discharge:  10/04/22  Primary Care Physician: Belinda Bocanegra MD    Consults     Date and Time Order Name Status Description    10/3/2022 10:11 AM Inpatient Hospitalist Consult            Active and Resolved Hospital Problems:  Active Hospital Problems    Diagnosis POA   • Postoperative anemia due to acute blood loss [D62] No   • OA (osteoarthritis) of hip [M16.9] Yes   • Stage 3a chronic kidney disease (HCC) [N18.31] Yes   • Paroxysmal atrial fibrillation (HCC) [I48.0] Yes   • Hypertension [I10] Yes      Resolved Hospital Problems    Diagnosis POA   • Status post right hip replacement [Z96.641] Not Applicable       Hospital Course     Hospital Course:  Toñito Kuo is a 72 y.o. male with type 2 diabetes, hypertension, paroxysmal atrial fibrillation, coronary artery disease, chronic kidney disease stage III who underwent elective right hip arthroplasty on 10/3/2022.  Had about 200 cc of blood loss.  Postprocedural hemoglobin 7.9. Pain was well controlled.  Tolerated oral intake.  Progressed well with physical therapy and was set up with home health.   Cleared from orthopedic perspective.  Continued on Eliquis for A. fib history.  Discussed stopping Diovan temporarily given bump in creatinine (1.79; baseline 1.4-1.5).  Home blood pressure monitoring discussed with patient and his wife at bedside.  Follow-up labs ordered in 1 week to monitor hemoglobin and kidney function.  At home care, follow-up instructions provided.  Discharged home in stable condition.    DISCHARGE Follow Up Recommendations for labs and diagnostics:   CBC, BMP in 1 week      Day of Discharge     Vital Signs:  Temp:  [97.2 °F (36.2 °C)-99.4 °F (37.4 °C)] 99.4 °F (37.4 °C)  Heart Rate:  [43-78] 64  Resp:  [14-20] 18  BP: ()/(21-73) 125/48  Flow (L/min):  [3-8]  3  Physical Exam:   GENERAL: The patient is conversant and nontoxic.  HEENT: PERRLA, EOMI. Oropharynx clear. Moist mucous membranes.   NECK: Supple, trachea midline  HEART: Regular rate and rhythm without murmurs.  LUNGS: Equal air entry, clear to auscultation bilaterally.  ABDOMEN: Soft, positive bowel sounds, nontender, nondistended  SKIN: No rash or open wounds   NEUROLOGIC: Alert, cranial nerves grossly intact,     Discharge Details        Discharge Medications      New Medications      Instructions Start Date   HYDROcodone-acetaminophen 7.5-325 MG per tablet  Commonly known as: Norco   1-2 tablets, Oral, Every 4 Hours PRN         Changes to Medications      Instructions Start Date   Eliquis 5 MG tablet tablet  Generic drug: apixaban  What changed: See the new instructions.   TAKE 1 TABLET TWICE DAILY  FOR  90  DAYS      metFORMIN  MG 24 hr tablet  Commonly known as: GLUCOPHAGE-XR  What changed:   · how much to take  · how to take this  · when to take this  · additional instructions   TAKE 1 TABLET TWICE DAILY WITH MEALS      rosuvastatin 40 MG tablet  Commonly known as: CRESTOR  What changed: See the new instructions.   TAKE 1 TABLET EVERY DAY  (INSTEAD  OF  ATORVASTATIN)         Continue These Medications      Instructions Start Date   amLODIPine 2.5 MG tablet  Commonly known as: NORVASC   TAKE 1 TABLET EVERY DAY      metoprolol succinate XL 50 MG 24 hr tablet  Commonly known as: TOPROL-XL   TAKE 1 TABLET EVERY DAY      multivitamin with minerals tablet tablet   1 tablet, Oral, Daily      nitroglycerin 0.4 MG SL tablet  Commonly known as: NITROSTAT   0.4 mg, Sublingual, Every 5 Minutes PRN      spironolactone 25 MG tablet  Commonly known as: ALDACTONE   TAKE 1 TABLET EVERY OTHER DAY      triamcinolone 0.1 % cream  Commonly known as: KENALOG   2 Times Daily PRN      valsartan-hydrochlorothiazide 320-25 MG per tablet  Commonly known as: DIOVAN-HCT   TAKE 1 TABLET EVERY MORNING             Allergies    Allergen Reactions   • Penicillins Rash       [place] [facility]iet:  Hospital:  Diet Order   Procedures   • Diet Regular       Discharge Activity:   Activity Instructions     Up WIth Assist            CODE STATUS:  There are no questions and answers to display.         Future Appointments   Date Time Provider Department Center   10/18/2022  9:30 AM Mirna Cordero PA-C [facility] [place] ORS [facility]   1/4/2023 10:00 AM CINDY Dang MD [facility] [place] CD [facility]E [facility]       Additional Instructions for the Follow-ups that You Need to Schedule     Ambulatory Referral to Home Health (Outpatient)   As directed      Face to Face Visit Date: 10/4/2022    Follow-up provider for Plan of Care?: I will be treating the patient on an ongoing basis.  Please send me the Plan of Care for signature.    Follow-up provider: SHARMIN HAWLEY [[id number]]    Reason/Clinical Findings: Status post right total hip    Describe mobility limitations that make leaving home difficult: Status post right total hip    Nursing/Therapeutic Services Requested: Skilled Nursing Physical Therapy Occupational Therapy    Skilled nursing orders: Pain management Wound care dressing/changes    PT orders: Total joint pathway Therapeutic exercise Gait Training    Weight Bearing Status: As Tolerated    Occupational orders: Activities of daily living Home safety assessment Strengthening    Frequency: 1 Week 1         Ambulatory Referral to Physical Therapy Evaluate and treat, POST OP   As directed      2-3x/week for 8-12 weeks  + modalities    Order Comments: 2-3x/week for 8-12 weeks + modalities     Specialty needed: Evaluate and treat POST OP    Follow-up needed: Yes         Discharge Follow-up with Specified Provider: Dr Hawley's office; 2 Weeks   As directed      To: Dr Hawley's office    Follow Up: 2 Weeks               Pertinent  and/or Most Recent Results     PROCEDURES:   TOTAL HIP ARTHROPLASTY ANTERIOR RIGHT 10/3/2022    LAB RESULTS:      Lab  10/04/22  0433 09/27/22  1124   WBC  --  6.61   HEMOGLOBIN 7.9* 10.0*   HEMATOCRIT 25.0* 31.2*   PLATELETS  --  301   NEUTROS ABS  --  4.65   IMMATURE GRANS (ABS)  --  0.02   LYMPHS ABS  --  0.93   MONOS ABS  --  0.66   EOS ABS  --  0.29   MCV  --  91.0   PROTIME  --  14.8         Lab 10/04/22  0433 09/27/22  1124   SODIUM 134* 139   POTASSIUM 4.9 4.5   CHLORIDE 101 102   CO2 23.7 26.8   ANION GAP 9.3 10.2   BUN 41* 25*   CREATININE 1.79* 1.34*   EGFR 39.3* 55.6*   GLUCOSE 138* 112*   CALCIUM 8.3* 9.3   HEMOGLOBIN A1C  --  6.40*         Lab 09/27/22  1124   TOTAL PROTEIN 7.3   ALBUMIN 4.30   GLOBULIN 3.0   ALT (SGPT) 11   AST (SGOT) 16   BILIRUBIN 0.2   ALK PHOS 57         Lab 09/27/22  1124   PROTIME 14.8   INR 1.14             Lab 09/27/22  1124   ABO TYPING A   RH TYPING Positive         Brief Urine Lab Results     None        Microbiology Results (last 10 days)     ** No results found for the last 240 hours. **          XR Hip With or Without Pelvis 2 - 3 View Right    Result Date: 10/3/2022  Impression:  Status post total hip arthroplasty in near anatomic alignment, with no evidence of immediate complication.      KEVIN FREIRE MD       Electronically Signed and Approved By: KEVIN FREIRE MD on 10/03/2022 at 9:19                         Labs Pending at Discharge:      Time spent on Discharge including face to face service: >30 minutes    Electronically signed by Venkatesh Mcconnell DO, 10/04/22, 7:34 AM EDT.

## 2022-10-04 NOTE — SIGNIFICANT NOTE
10/04/22 1239   Plan   Final Discharge Disposition Code 06 - home with home health care   Final Note [facility].

## 2022-10-04 NOTE — PROGRESS NOTES
Orthopedic Total Hip Progress Note    Assessment/Plan request home health, resume home Eliquis, follow-up in 2 weeks    Status post-right total hip arthroplasty: Doing well postoperatively.    Pain Relief: some relief    Continues current post-op course    Activity: up with assistance    Weight Bearing: WBAT     LOS: 0 days     Subjective     Post-Operative Day: 1 post-op total hip arthroplasty  Systemic or Specific Complaints: No Complaints    Objective     Vital signs in last 24 hours:  Vitals:    10/03/22 2208 10/03/22 2253 10/03/22 2303 10/04/22 0300   BP:   120/46 125/48   BP Location:   Right arm Right arm   Patient Position:   Lying Lying   Pulse:   62 64   Resp:   18 18   Temp:   97.9 °F (36.6 °C) 99.4 °F (37.4 °C)   TempSrc:   Oral Oral   SpO2: 91% 93% 95% 95%   Weight:       Height:            General: alert, appears stated age, and cooperative   Neurovascular: Tibial nerve: Intact, Superficial peroneal nerve: Intact, and Deep peroneal nerve: Intact  Capillary refill: Normal   Wound: Wound clean and dry no evidence of infection.   Range of Motion: Limited flexsion and Limited extension   DVT Exam: No evidence of DVT seen on physical exam.      Hemoglobin   Date Value Ref Range Status   10/04/2022 7.9 (L) 13.0 - 17.7 g/dL Final     Hematocrit   Date Value Ref Range Status   10/04/2022 25.0 (L) 37.5 - 51.0 % Final        Basic Metabolic Panel    Sodium Sodium   Date Value Ref Range Status   10/04/2022 134 (L) 136 - 145 mmol/L Final      Potassium Potassium   Date Value Ref Range Status   10/04/2022 4.9 3.5 - 5.2 mmol/L Final      Chloride Chloride   Date Value Ref Range Status   10/04/2022 101 98 - 107 mmol/L Final      Bicarbonate No results found for: PLASMABICARB   BUN BUN   Date Value Ref Range Status   10/04/2022 41 (H) 8 - 23 mg/dL Final      Creatinine Creatinine   Date Value Ref Range Status   10/04/2022 1.79 (H) 0.76 - 1.27 mg/dL Final      Calcium Calcium   Date Value Ref Range Status   10/04/2022  8.3 (L) 8.6 - 10.5 mg/dL Final      Glucose      No components found for: GLUCOSE.*      XR Hip With or Without Pelvis 2 - 3 View Right   Final Result    Status post total hip arthroplasty in near anatomic alignment, with no evidence of    immediate complication.                    KEVIN FREIRE MD          Electronically Signed and Approved By: KEVIN FREIRE MD on 10/03/2022 at 9:19                           FL < 1 Hour   Final Result      XR Hip With or Without Pelvis 2 - 3 View Right   Final Result

## 2022-10-04 NOTE — PLAN OF CARE
Goal Outcome Evaluation:  Plan of Care Reviewed With: patient        Progress: no change  Outcome Evaluation: pt. is a one person assist with walker.  pt. medicated with scheduled pain medication this shift with relief noted. will be discharged home with home health .

## 2022-10-05 ENCOUNTER — HOSPITAL ENCOUNTER (EMERGENCY)
Facility: HOSPITAL | Age: 72
Discharge: HOME | End: 2022-10-05
Attending: EMERGENCY MEDICINE | Admitting: EMERGENCY MEDICINE

## 2022-10-05 ENCOUNTER — TELEPHONE (OUTPATIENT)
Dept: INTERNAL MEDICINE | Facility: CLINIC | Age: 72
End: 2022-10-05

## 2022-10-05 VITALS
SYSTOLIC BLOOD PRESSURE: 127 MMHG | HEART RATE: 65 BPM | DIASTOLIC BLOOD PRESSURE: 69 MMHG | HEIGHT: 72 IN | OXYGEN SATURATION: 99 % | RESPIRATION RATE: 18 BRPM | BODY MASS INDEX: 35.24 KG/M2 | TEMPERATURE: 99 F | WEIGHT: 260.14 LBS

## 2022-10-05 DIAGNOSIS — D64.9 ANEMIA, UNSPECIFIED TYPE: Primary | ICD-10-CM

## 2022-10-05 LAB
ABO GROUP BLD: NORMAL
ALBUMIN SERPL-MCNC: 3.8 G/DL (ref 3.5–5.2)
ALBUMIN/GLOB SERPL: 1.1 G/DL
ALP SERPL-CCNC: 57 U/L (ref 39–117)
ALT SERPL W P-5'-P-CCNC: 10 U/L (ref 1–41)
ANION GAP SERPL CALCULATED.3IONS-SCNC: 8.3 MMOL/L (ref 5–15)
APTT PPP: 38.3 SECONDS (ref 78–95.9)
AST SERPL-CCNC: 28 U/L (ref 1–40)
BASOPHILS # BLD AUTO: 0.02 10*3/MM3 (ref 0–0.2)
BASOPHILS NFR BLD AUTO: 0.2 % (ref 0–1.5)
BILIRUB SERPL-MCNC: 0.3 MG/DL (ref 0–1.2)
BLD GP AB SCN SERPL QL: NEGATIVE
BUN SERPL-MCNC: 30 MG/DL (ref 8–23)
BUN/CREAT SERPL: 22.1 (ref 7–25)
CALCIUM SPEC-SCNC: 9.5 MG/DL (ref 8.6–10.5)
CHLORIDE SERPL-SCNC: 99 MMOL/L (ref 98–107)
CO2 SERPL-SCNC: 26.7 MMOL/L (ref 22–29)
CREAT SERPL-MCNC: 1.36 MG/DL (ref 0.76–1.27)
DEPRECATED RDW RBC AUTO: 47.8 FL (ref 37–54)
EGFRCR SERPLBLD CKD-EPI 2021: 54.6 ML/MIN/1.73
EOSINOPHIL # BLD AUTO: 0.57 10*3/MM3 (ref 0–0.4)
EOSINOPHIL NFR BLD AUTO: 6.2 % (ref 0.3–6.2)
ERYTHROCYTE [DISTWIDTH] IN BLOOD BY AUTOMATED COUNT: 14.1 % (ref 12.3–15.4)
GLOBULIN UR ELPH-MCNC: 3.4 GM/DL
GLUCOSE SERPL-MCNC: 128 MG/DL (ref 65–99)
HCT VFR BLD AUTO: 27 % (ref 37.5–51)
HGB BLD-MCNC: 8.5 G/DL (ref 13–17.7)
IMM GRANULOCYTES # BLD AUTO: 0.03 10*3/MM3 (ref 0–0.05)
IMM GRANULOCYTES NFR BLD AUTO: 0.3 % (ref 0–0.5)
INR PPP: 1.17 (ref 0.86–1.15)
LYMPHOCYTES # BLD AUTO: 0.79 10*3/MM3 (ref 0.7–3.1)
LYMPHOCYTES NFR BLD AUTO: 8.7 % (ref 19.6–45.3)
MCH RBC QN AUTO: 29.1 PG (ref 26.6–33)
MCHC RBC AUTO-ENTMCNC: 31.5 G/DL (ref 31.5–35.7)
MCV RBC AUTO: 92.5 FL (ref 79–97)
MONOCYTES # BLD AUTO: 0.81 10*3/MM3 (ref 0.1–0.9)
MONOCYTES NFR BLD AUTO: 8.9 % (ref 5–12)
NEUTROPHILS NFR BLD AUTO: 6.91 10*3/MM3 (ref 1.7–7)
NEUTROPHILS NFR BLD AUTO: 75.7 % (ref 42.7–76)
NRBC BLD AUTO-RTO: 0 /100 WBC (ref 0–0.2)
PLATELET # BLD AUTO: 258 10*3/MM3 (ref 140–450)
PMV BLD AUTO: 9 FL (ref 6–12)
POTASSIUM SERPL-SCNC: 4.7 MMOL/L (ref 3.5–5.2)
PROT SERPL-MCNC: 7.2 G/DL (ref 6–8.5)
PROTHROMBIN TIME: 15.1 SECONDS (ref 11.8–14.9)
RBC # BLD AUTO: 2.92 10*6/MM3 (ref 4.14–5.8)
RH BLD: POSITIVE
SODIUM SERPL-SCNC: 134 MMOL/L (ref 136–145)
T&S EXPIRATION DATE: NORMAL
WBC NRBC COR # BLD: 9.13 10*3/MM3 (ref 3.4–10.8)

## 2022-10-05 PROCEDURE — 86850 RBC ANTIBODY SCREEN: CPT | Performed by: EMERGENCY MEDICINE

## 2022-10-05 PROCEDURE — 86900 BLOOD TYPING SEROLOGIC ABO: CPT | Performed by: EMERGENCY MEDICINE

## 2022-10-05 PROCEDURE — 99283 EMERGENCY DEPT VISIT LOW MDM: CPT

## 2022-10-05 PROCEDURE — 85025 COMPLETE CBC W/AUTO DIFF WBC: CPT

## 2022-10-05 PROCEDURE — 80053 COMPREHEN METABOLIC PANEL: CPT

## 2022-10-05 PROCEDURE — 36415 COLL VENOUS BLD VENIPUNCTURE: CPT

## 2022-10-05 PROCEDURE — 86901 BLOOD TYPING SEROLOGIC RH(D): CPT | Performed by: EMERGENCY MEDICINE

## 2022-10-05 PROCEDURE — 85610 PROTHROMBIN TIME: CPT

## 2022-10-05 PROCEDURE — 85730 THROMBOPLASTIN TIME PARTIAL: CPT

## 2022-10-05 NOTE — TELEPHONE ENCOUNTER
Patient called about lab results from   10/04/2022 ordered by Orthopedic surgery on 10/22. Hemoglobin low 7.9. hematocrit 25.0 I spoke to Nohelia Del Rosario she advised patient to go to ER for blood and repeat labs.

## 2022-10-05 NOTE — ED PROVIDER NOTES
Time: 4:59 PM EDT  Chief Complaint: abnormal labs      Chief Complaint   Patient presents with   • Abnormal Lab     pt sent by PCP for abnormal blood work. Hip replacement 2 days ago.           History of Present Illness:  Patient is a 72 y.o. year old male who presents to the emergency department with concerns over abnormal labs.  Patient recently had hip surgery and then yesterday had his labs reevaluated.  He states he noticed a drastic difference between labs drawn yesterday than his typical baseline lab.  He spoke with his primary care provider and they advised him to come to the emergency room right away.  The patient was comparing yesterday labs with preprocedural labs.  He denies any difficulty breathing, chest pain, nausea, vomiting, diarrhea, and his skin is pink warm and dry. ([place], APRN - PIT Provider).     Pt came to ED per instructions of his provider. Pt is concerned about his hemoglobin levels. Pt feels good and his pain from surgery is tolerable. Pt is on blood thinners, which he has restarted. He is also taking pain medications. Due to the pain medications, the pt feels constipated. Pt wants his lab results to be compared and he wants to know about his kidney fuction (poli Navarrete).            History provided by:  Patient and relative   used: No            Patient Care Team  Primary Care Provider: Belinda Bocanegra MD    Past Medical History:     Allergies   Allergen Reactions   • Penicillins Rash     Past Medical History:   Diagnosis Date   • Bladder disease    • CAD (coronary artery disease) 11/09/2016    hx    • Cancer (HCC)     SKIN CANCER BASAL LEFT EYE AND EAR  AND SQAMOUS CELL RIGHT SIDE TORSO   • Chest pain     DENIES ANY RECENT CP/SOA. FOLLOWED BY DR MARCELINO AND TRANSITION TO DR WHIPPLE. REPORTS HAS [address] OF LAND AND WORKS ON IT   • Coronary atherosclerosis of native coronary vessel 03/29/2015   • Diabetes mellitus (HCC)     type 2. DOES NOT CHECK BS DAILY    • GERD (gastroesophageal reflux disease)    • Hard of hearing     HEARS BETTER OUT OF RIGHT EAR. BILATERAL HEARING AID   • Heart attack (HCC)    • HTN (hypertension)    • Hyperlipemia    • Osteoarthritis     BILATERAL HIPS   • Paroxysmal atrial fibrillation (HCC) 08/11/2021   • Seasonal allergies    • Sleep apnea     over 10 yrs ago   • Stage 3a chronic kidney disease (HCC) 08/11/2021     Past Surgical History:   Procedure Laterality Date   • APPENDECTOMY      1957   • COLONOSCOPY      diverticulosis   • CORONARY ARTERY BYPASS GRAFT      2/2011   • HERNIA REPAIR     • TESTICLE SURGERY  1974   • TOTAL HIP ARTHROPLASTY Right 10/3/2022    Procedure: TOTAL HIP ARTHROPLASTY ANTERIOR RIGHT;  Surgeon: Ravinder Hawley MD;  Location: [facility] MAIN OR;  Service: Orthopedics;  Laterality: Right;     Family History   Problem Relation Age of Onset   • Melanoma Mother    • Breast cancer Sister    • Colon cancer Maternal Grandmother    • Diabetes Maternal Grandmother    • Lung cancer Maternal Grandfather    • Breast cancer Paternal Grandmother    • Malig Hyperthermia Neg Hx        Home Medications:  Prior to Admission medications    Medication Sig Start Date End Date Taking? Authorizing Provider   amLODIPine (NORVASC) 2.5 MG tablet TAKE 1 TABLET EVERY DAY 6/22/22   Tita Charles June, APRN   Eliquis 5 MG tablet tablet TAKE 1 TABLET TWICE DAILY  FOR  90  DAYS  Patient taking differently: Take 5 mg by mouth Every 12 (Twelve) Hours. LAST DOSE PM DOSE 9/29/22 PER DR MARCELINO 2/9/22   Antoinette Marcelino MD   HYDROcodone-acetaminophen (Norco) 7.5-325 MG per tablet Take 1-2 tablets by mouth Every 4 (Four) Hours As Needed for Moderate Pain. 10/4/22   Ravinder Hawley MD   metFORMIN ER (GLUCOPHAGE-XR) 500 MG 24 hr tablet TAKE 1 TABLET TWICE DAILY WITH MEALS  Patient taking differently: INSTRUCTED PER ANESTHESIA PROTOCOL 5/11/22   Belinda Bocanegra MD   metoprolol succinate XL (TOPROL-XL) 50 MG 24 hr tablet TAKE 1 TABLET EVERY DAY 6/1/22   Hardy  "Moriah Zaidi MD   multivitamin with minerals tablet tablet Take 1 tablet by mouth Daily.    Provider, MD Donovan   nitroglycerin (NITROSTAT) 0.4 MG SL tablet Place 0.4 mg under the tongue Every 5 (Five) Minutes As Needed.    ProviderDonovan MD   rosuvastatin (CRESTOR) 40 MG tablet TAKE 1 TABLET EVERY DAY  (INSTEAD  OF  ATORVASTATIN)  Patient taking differently: Take 40 mg by mouth Daily. 9/28/22   Antoinette Tobar MD   spironolactone (ALDACTONE) 25 MG tablet TAKE 1 TABLET EVERY OTHER DAY 5/10/22   Tita Charles Cherry, APRJORDAN   triamcinolone (KENALOG) 0.1 % cream 2 (Two) Times a Day As Needed. 1/12/21   ProviderDonovan MD   valsartan-hydrochlorothiazide (DIOVAN-HCT) 320-25 MG per tablet TAKE 1 TABLET EVERY MORNING 6/14/22 10/4/22  Tita Charles June, APRN        Social History:   Social History     Tobacco Use   • Smoking status: Never Smoker   • Smokeless tobacco: Never Used   Vaping Use   • Vaping Use: Never used   Substance Use Topics   • Alcohol use: Yes     Comment: OCC   • Drug use: Never         Review of Systems:  Review of Systems   Constitutional: Negative for chills and fever.   HENT: Negative for congestion, ear pain and sore throat.    Eyes: Negative for pain.   Respiratory: Negative for cough, chest tightness and shortness of breath.    Cardiovascular: Negative for chest pain.   Gastrointestinal: Positive for constipation. Negative for abdominal pain, diarrhea, nausea and vomiting.   Genitourinary: Negative for flank pain and hematuria.   Musculoskeletal: Negative for joint swelling.   Skin: Negative for color change and pallor.   Neurological: Negative for seizures and headaches.   All other systems reviewed and are negative.       Physical Exam:  /69   Pulse 65   Temp 99 °F (37.2 °C) (Oral)   Resp 18   Ht 182.9 cm (72\")   Wt 118 kg (260 lb 2.3 oz)   SpO2 99%   BMI 35.28 kg/m²     Physical Exam  Vitals and nursing note reviewed.   Constitutional:       General: He is not in acute " distress.     Appearance: Normal appearance. He is not ill-appearing or toxic-appearing.   HENT:      Head: Normocephalic and atraumatic.      Jaw: There is normal jaw occlusion.   Eyes:      General: Lids are normal.      Extraocular Movements: Extraocular movements intact.      Conjunctiva/sclera: Conjunctivae normal.      Pupils: Pupils are equal, round, and reactive to light.   Cardiovascular:      Rate and Rhythm: Normal rate and regular rhythm.      Pulses: Normal pulses.      Heart sounds: Normal heart sounds.   Pulmonary:      Effort: Pulmonary effort is normal. No respiratory distress.      Breath sounds: Normal breath sounds. No wheezing or rhonchi.   Abdominal:      General: Abdomen is flat. There is no distension.      Palpations: Abdomen is soft.      Tenderness: There is no abdominal tenderness. There is no guarding or rebound.   Musculoskeletal:         General: Normal range of motion.      Cervical back: Normal range of motion and neck supple.      Right lower leg: No edema.      Left lower leg: No edema.   Skin:     General: Skin is warm and dry.      Coloration: Skin is not cyanotic.      Findings: Wound (on right hip clean, dry, intact, and no signs of infection) present.   Neurological:      Mental Status: He is alert and oriented to person, place, and time. Mental status is at baseline.      Sensory: No sensory deficit.   Psychiatric:         Attention and Perception: Attention and perception normal.         Mood and Affect: Mood normal.                Medications in the Emergency Department:  Medications - No data to display     Labs  Lab Results (last 24 hours)     Procedure Component Value Units Date/Time    CBC & Differential [[id number]]  (Abnormal) Collected: 10/05/22 1702    Specimen: Blood Updated: 10/05/22 1728    Narrative:      The following orders were created for panel order CBC & Differential.  Procedure                               Abnormality         Status                      ---------                               -----------         ------                     CBC Auto Differential[[id number]]        Abnormal            Final result                 Please view results for these tests on the individual orders.    Comprehensive Metabolic Panel [[id number]]  (Abnormal) Collected: 10/05/22 1702    Specimen: Blood Updated: 10/05/22 1754     Glucose 128 mg/dL      BUN 30 mg/dL      Creatinine 1.36 mg/dL      Sodium 134 mmol/L      Potassium 4.7 mmol/L      Chloride 99 mmol/L      CO2 26.7 mmol/L      Calcium 9.5 mg/dL      Total Protein 7.2 g/dL      Albumin 3.80 g/dL      ALT (SGPT) 10 U/L      AST (SGOT) 28 U/L      Alkaline Phosphatase 57 U/L      Total Bilirubin 0.3 mg/dL      Globulin 3.4 gm/dL      A/G Ratio 1.1 g/dL      BUN/Creatinine Ratio 22.1     Anion Gap 8.3 mmol/L      eGFR 54.6 mL/min/1.73      Comment: National Kidney Foundation and American Society of Nephrology (ASN) Task Force recommended calculation based on the Chronic Kidney Disease Epidemiology Collaboration (CKD-EPI) equation refit without adjustment for race.       Narrative:      GFR Normal >60  Chronic Kidney Disease <60  Kidney Failure <15      Protime-INR [[id number]]  (Abnormal) Collected: 10/05/22 1702    Specimen: Blood Updated: 10/05/22 1738     Protime 15.1 Seconds      INR 1.17    Narrative:      Suggested Therapeutic Ranges For Oral Anticoagulant Therapy:  Level of Therapy                      INR Target Range  Standard Dose                            2.0-3.0  High Dose                                2.5-3.5  Patients not receiving anticoagulant  Therapy Normal Range                     0.86-1.15    aPTT [[id number]]  (Abnormal) Collected: 10/05/22 1702    Specimen: Blood Updated: 10/05/22 1738     PTT 38.3 seconds     CBC Auto Differential [[id number]]  (Abnormal) Collected: 10/05/22 1702    Specimen: Blood Updated: 10/05/22 1728     WBC 9.13 10*3/mm3      RBC 2.92 10*6/mm3      Hemoglobin 8.5 g/dL       Hematocrit 27.0 %      MCV 92.5 fL      MCH 29.1 pg      MCHC 31.5 g/dL      RDW 14.1 %      RDW-SD 47.8 fl      MPV 9.0 fL      Platelets 258 10*3/mm3      Neutrophil % 75.7 %      Lymphocyte % 8.7 %      Monocyte % 8.9 %      Eosinophil % 6.2 %      Basophil % 0.2 %      Immature Grans % 0.3 %      Neutrophils, Absolute 6.91 10*3/mm3      Lymphocytes, Absolute 0.79 10*3/mm3      Monocytes, Absolute 0.81 10*3/mm3      Eosinophils, Absolute 0.57 10*3/mm3      Basophils, Absolute 0.02 10*3/mm3      Immature Grans, Absolute 0.03 10*3/mm3      nRBC 0.0 /100 WBC            Imaging:  No Radiology Exams Resulted Within Past 24 Hours    Procedures:  Procedures    Progress  ED Course as of 10/05/22 1932   Wed Oct 05, 2022   1701   --- PROVIDER IN TRIAGE NOTE ---    Patient was seen and evaluated in triage by ANDRES Perry.  Orders were written and the patient is currently awaiting disposition.   [MS]      ED Course User Index  [MS] Munira Frank APRN                            The patient was initially evaluated in the triage area where orders were placed. The patient was later dispositioned by Conrad Garcia MD.      The patient was advised to stay for completion of workup which includes but is not limited to communication of labs and radiological results, reassessment and plan. The patient was advised that leaving prior to disposition by a provider could result in critical findings that are not communicated to the patient.     Medical Decision Making:  MDM  Number of Diagnoses or Management Options  Anemia, unspecified type  Diagnosis management comments: In summary this is a 72-year-old male who presents to the emergency department status post recent right total hip arthroplasty.  He reports his PCP sent him in for evaluation today secondary to abnormal blood work performed yesterday.  Yesterday hemoglobin revealed 7.9 with acute on chronic renal insufficiency as well.  Today hemoglobin is improved  and kidney function is also improved.  Very strict return to ER and follow-up instructions have been provided to the patient.         Amount and/or Complexity of Data Reviewed  Decide to obtain previous medical records or to obtain history from someone other than the patient: yes             The following orders were placed after triage and evaluation:  Orders Placed This Encounter   Procedures   • Comprehensive Metabolic Panel   • Protime-INR   • aPTT   • CBC Auto Differential   • Type & Screen   • CBC & Differential       Final diagnoses:   Anemia, unspecified type          Disposition:  ED Disposition     ED Disposition   Discharge    Condition   Stable    Comment   --             This medical record created using voice recognition software.    Documentation assistance provided by Mary Kate Severino, acting as scribe for Dr. Conrad Garcia. Information recorded by the scribe was done at my direction and has been verified and validated by me.         Mary Kate Severino  10/05/22 [id number]       Mary Kate Severino  10/05/22 1010       Conrad Garcia MD  10/05/22 7631

## 2022-10-05 NOTE — DISCHARGE INSTRUCTIONS
Your hemoglobin has improved as has your kidney function from yesterday's labs.  No indication for blood transfusion.

## 2022-10-05 NOTE — THERAPY DISCHARGE NOTE
Inpatient Rehabilitation - Occupational Therapy Discharge   [facility]    Patient Name: Toñito Kuo  : 1950    MRN: [id number]                              Today's Date: 10/5/2022       Admit Date: 10/3/2022    Visit Dx:     ICD-10-CM ICD-9-CM   1. Difficulty in walking  R26.2 719.7   2. Primary osteoarthritis of right hip  M16.11 715.15   3. Decreased activities of daily living (ADL)  Z78.9 V49.89   4. Stage 3a chronic kidney disease (HCC)  N18.31 585.3   5. Anemia, unspecified type  D64.9 285.9     Patient Active Problem List   Diagnosis   • Atherosclerosis of coronary artery bypass graft of native heart without angina pectoris   • Hyperlipemia   • Hypertension   • Paroxysmal atrial fibrillation (HCC)   • Pedal edema   • Stage 3a chronic kidney disease (HCC)   • Anemia   • RAUL on CPAP   • Type 2 diabetes mellitus with hyperglycemia, without long-term current use of insulin (HCC)   • Class 1 obesity   • OA (osteoarthritis) of hip   • Postoperative anemia due to acute blood loss     Past Medical History:   Diagnosis Date   • Bladder disease    • CAD (coronary artery disease) 2016    hx    • Cancer (HCC)     SKIN CANCER BASAL LEFT EYE AND EAR  AND SQAMOUS CELL RIGHT SIDE TORSO   • Chest pain     DENIES ANY RECENT CP/SOA. FOLLOWED BY DR MARCELINO AND TRANSITION TO DR WHIPPLE. REPORTS HAS [address] OF [address]AND AND WORKS ON IT   • Coronary atherosclerosis of native coronary vessel 2015   • Diabetes mellitus (HCC)     type 2. DOES NOT CHECK BS DAILY   • GERD (gastroesophageal reflux disease)    • Hard of hearing     HEARS BETTER OUT OF RIGHT EAR. BILATERAL HEARING AID   • Heart attack (HCC)    • HTN (hypertension)    • Hyperlipemia    • Osteoarthritis     BILATERAL HIPS   • Paroxysmal atrial fibrillation (HCC) 2021   • Seasonal allergies    • Sleep apnea     over 10 yrs ago   • Stage 3a chronic kidney disease (HCC) 2021     Past Surgical History:   Procedure Laterality Date   • APPENDECTOMY       1957   • COLONOSCOPY      diverticulosis   • CORONARY ARTERY BYPASS GRAFT      2/2011   • HERNIA REPAIR     • TESTICLE SURGERY  1974   • TOTAL HIP ARTHROPLASTY Right 10/3/2022    Procedure: TOTAL HIP ARTHROPLASTY ANTERIOR RIGHT;  Surgeon: Ravinder Hawley MD;  Location: [facility] MAIN OR;  Service: Orthopedics;  Laterality: Right;      General Information     Row Name 10/05/22 0552          OT Time and Intention    Document Type discharge evaluation/summary  -PG     Mode of Treatment occupational therapy  -PG           User Key  (r) = Recorded By, (t) = Taken By, (c) = Cosigned By    Initials Name Provider Type    PG Rick Kimbrough OT Occupational Therapist               Mobility/ADL's    No documentation.                Obj/Interventions    No documentation.                Goals/Plan     Row Name 10/05/22 0553          Transfer Goal 1 (OT)    Progress/Outcome (Transfer Goal 1, OT) discharged from facility  -     Row Name 10/05/22 0553          Bathing Goal 1 (OT)    Progress/Outcomes (Bathing Goal 1, OT) discharged from facility  -     Row Name 10/05/22 0553          Dressing Goal 1 (OT)    Progress/Outcome (Dressing Goal 1, OT) discharged from facility  -     Row Name 10/05/22 0553          Toileting Goal 1 (OT)    Progress/Outcome (Toileting Goal 1, OT) discharged from facility  -PG     Row Name 10/05/22 0553          Grooming Goal 1 (OT)    Progress/Outcome (Grooming Goal 1, OT) discharged from facility  -           User Key  (r) = Recorded By, (t) = Taken By, (c) = Cosigned By    Initials Name Provider Type    PG Rick Kimbrough OT Occupational Therapist               Clinical Impression    No documentation.                Outcome Measures    No documentation.               Occupational Therapy Education                 Title: PT OT SLP Therapies (Resolved)     Topic: Occupational Therapy (Resolved)     Point: ADL training (Resolved)     Description:   Instruct learner(s) on proper safety adaptation and  remediation techniques during self care or transfers.   Instruct in proper use of assistive devices.              Learning Progress Summary           Patient Acceptance, D,E, DU by PG at 10/4/2022 0912                   Point: Home exercise program (Resolved)     Description:   Instruct learner(s) on appropriate technique for monitoring, assisting and/or progressing therapeutic exercises/activities.              Learning Progress Summary           Patient Acceptance, D,E, DU by PG at 10/4/2022 0912                   Point: Precautions (Resolved)     Description:   Instruct learner(s) on prescribed precautions during self-care and functional transfers.              Learning Progress Summary           Patient Acceptance, D,E, DU by PG at 10/4/2022 0912                   Point: Body mechanics (Resolved)     Description:   Instruct learner(s) on proper positioning and spine alignment during self-care, functional mobility activities and/or exercises.              Learning Progress Summary           Patient Acceptance, D,E, DU by PG at 10/4/2022 0912                               User Key     Initials Effective Dates Name Provider Type Discipline     06/16/21 -  Rick Kimbrough OT Occupational Therapist OT              OT Recommendation and Plan  Planned Therapy Interventions (OT): BADL retraining, transfer/mobility retraining, strengthening exercise, patient/caregiver education/training  Therapy Frequency (OT): 5 times/wk  Plan of Care Review  Plan of Care Reviewed With: patient  Progress: no change  Outcome Evaluation: Patient presents with limitations affecting strength, activity tolerance, and balance impacting patient's ability to return home safely and independently.  The skills of a therapist will be required to safely and effectively implement the following treatment plan to restore maximal level of function  Plan of Care Reviewed With: patient  Outcome Evaluation: Patient presents with limitations affecting  strength, activity tolerance, and balance impacting patient's ability to return home safely and independently.  The skills of a therapist will be required to safely and effectively implement the following treatment plan to restore maximal level of function     Time Calculation:     Therapy Charges for Today     Code Description Service Date Service Provider Modifiers Qty    77768859804  OT THERAPEUTIC ACT EA 15 MIN 10/4/2022 Rick Kimbrough OT GO 1    [id number]  OT SELF CARE/MGMT/TRAIN EA 15 MIN 10/4/2022 Rick Kimbrough OT GO 1    [id number]  OT EVAL LOW COMPLEXITY 2 10/4/2022 Rick Kimbrough OT GO 1             OT Discharge Summary  Anticipated Discharge Disposition (OT): home with outpatient therapy services  Reason for Discharge: Discharge from facility  Outcomes Achieved: Discharge from facility occurred on same date as evluation  Discharge Destination: Home with outpatient services    Rick Kimbrough OT  10/5/2022

## 2022-10-13 ENCOUNTER — OFFICE VISIT (OUTPATIENT)
Dept: ORTHOPEDIC SURGERY | Facility: CLINIC | Age: 72
End: 2022-10-13

## 2022-10-13 ENCOUNTER — LAB (OUTPATIENT)
Dept: LAB | Facility: HOSPITAL | Age: 72
End: 2022-10-13

## 2022-10-13 ENCOUNTER — TELEPHONE (OUTPATIENT)
Dept: ORTHOPEDIC SURGERY | Facility: CLINIC | Age: 72
End: 2022-10-13

## 2022-10-13 DIAGNOSIS — Z96.641 AFTERCARE FOLLOWING RIGHT HIP JOINT REPLACEMENT SURGERY: Primary | ICD-10-CM

## 2022-10-13 DIAGNOSIS — Z47.1 AFTERCARE FOLLOWING RIGHT HIP JOINT REPLACEMENT SURGERY: Primary | ICD-10-CM

## 2022-10-13 DIAGNOSIS — N18.31 STAGE 3A CHRONIC KIDNEY DISEASE: Chronic | ICD-10-CM

## 2022-10-13 DIAGNOSIS — D64.9 ANEMIA, UNSPECIFIED TYPE: ICD-10-CM

## 2022-10-13 DIAGNOSIS — Z47.1 AFTERCARE FOLLOWING LEFT HIP JOINT REPLACEMENT SURGERY: Primary | ICD-10-CM

## 2022-10-13 DIAGNOSIS — R26.2 DIFFICULTY IN WALKING: ICD-10-CM

## 2022-10-13 DIAGNOSIS — Z96.642 AFTERCARE FOLLOWING LEFT HIP JOINT REPLACEMENT SURGERY: Primary | ICD-10-CM

## 2022-10-13 LAB
ANION GAP SERPL CALCULATED.3IONS-SCNC: 10 MMOL/L (ref 5–15)
BUN SERPL-MCNC: 24 MG/DL (ref 8–23)
BUN/CREAT SERPL: 20.5 (ref 7–25)
CALCIUM SPEC-SCNC: 9.9 MG/DL (ref 8.6–10.5)
CHLORIDE SERPL-SCNC: 100 MMOL/L (ref 98–107)
CO2 SERPL-SCNC: 27 MMOL/L (ref 22–29)
CREAT SERPL-MCNC: 1.17 MG/DL (ref 0.76–1.27)
DEPRECATED RDW RBC AUTO: 39.1 FL (ref 37–54)
EGFRCR SERPLBLD CKD-EPI 2021: 65.4 ML/MIN/1.73
ERYTHROCYTE [DISTWIDTH] IN BLOOD BY AUTOMATED COUNT: 12.4 % (ref 12.3–15.4)
GLUCOSE SERPL-MCNC: 151 MG/DL (ref 65–99)
HCT VFR BLD AUTO: 25.9 % (ref 37.5–51)
HGB BLD-MCNC: 8.5 G/DL (ref 13–17.7)
MCH RBC QN AUTO: 28.2 PG (ref 26.6–33)
MCHC RBC AUTO-ENTMCNC: 32.8 G/DL (ref 31.5–35.7)
MCV RBC AUTO: 86 FL (ref 79–97)
PLATELET # BLD AUTO: 443 10*3/MM3 (ref 140–450)
PMV BLD AUTO: 8.8 FL (ref 6–12)
POTASSIUM SERPL-SCNC: 4.7 MMOL/L (ref 3.5–5.2)
RBC # BLD AUTO: 3.01 10*6/MM3 (ref 4.14–5.8)
SODIUM SERPL-SCNC: 137 MMOL/L (ref 136–145)
WBC NRBC COR # BLD: 10.99 10*3/MM3 (ref 3.4–10.8)

## 2022-10-13 PROCEDURE — 85027 COMPLETE CBC AUTOMATED: CPT

## 2022-10-13 PROCEDURE — 36415 COLL VENOUS BLD VENIPUNCTURE: CPT

## 2022-10-13 PROCEDURE — 80048 BASIC METABOLIC PNL TOTAL CA: CPT

## 2022-10-13 PROCEDURE — 99024 POSTOP FOLLOW-UP VISIT: CPT | Performed by: PHYSICIAN ASSISTANT

## 2022-10-13 RX ORDER — HYDROCODONE BITARTRATE AND ACETAMINOPHEN 7.5; 325 MG/1; MG/1
1 TABLET ORAL EVERY 4 HOURS PRN
Qty: 42 TABLET | Refills: 0 | Status: SHIPPED | OUTPATIENT
Start: 2022-10-13 | End: 2022-12-08

## 2022-10-13 NOTE — PATIENT INSTRUCTIONS
Discussed with patient to continue present course with PT.    Patient did have a CBC and BMP drawn today.  We will follow these results.  Did advise the patient to increase spironolactone to daily for the next 3 days to help with fluid retention.  He reports being up 10 pounds since hospitalization.  Continue daily weights.  Advised to call PCP or cardiologist with continued increasing weight gain or if he should develop any shortness of breath.    Larger pair of DEDE hose were provided in office today and patient was encouraged to wear these when out of bed.    Patient does have follow-up appointment scheduled for 10/18/2022.

## 2022-10-13 NOTE — PROGRESS NOTES
"Chief Complaint  Follow-up of the Right Hip    Subjective      Toñito Kuo presents to [facility] for follow-up of right total hip arthroplasty with anterior approach performed on 10/3/2022 by Dr. Currie.  Patient presents today with use of Rollator.  He expresses concern related to severe bilateral lower extremity edema which has worsened since discharge from hospital.  Reports he is up approximately 10 pounds.  He does have a history of chronic kidney disease stage III with baseline creatinine of 1.4-1.5, which had apparently increased to approximately 1.7 during his hospitalization.  For this reason, patient's Diovan was held.  Patient also reports that he had previously been on spironolactone daily, although related to worsening creatinine, this was decreased to 3 times per week on Mondays, Wednesdays, and Fridays.  He did take an extra dose of spironolactone today and has noted some mild improvement.  Patient is unable to wear the compression stockings obtained at the hospital as these are \"too tight\".    Objective   Allergies   Allergen Reactions   • Penicillins Rash       Vital Signs:   There were no vitals taken for this visit.      Physical Exam    Constitutional: Awake, alert. Well nourished appearance.    Integumentary: Warm, dry, intact. No obvious rashes.    HENT: Atraumatic, normocephalic.   Respiratory: Non labored respirations .   Cardiovascular: Intact peripheral pulses.    Psychiatric: Normal mood and affect. A&O X3    Ortho Exam  Right hip: Surgical dressing in place.  Good strength to hip flexors, extensors, abductors, and adductor's.  Full knee flexion and extension.  Full plantarflexion and dorsiflexion of the ankle.  Sensation intact to light touch.  Bilateral lower extremities no with 3+ pitting edema.    Imaging Results (Most Recent)     None                    Assessment and Plan   Problem List Items Addressed This Visit    None  Visit Diagnoses     Aftercare " following right hip joint replacement surgery    -  Primary        Follow Up   Return in about 5 days (around 10/18/2022).    Patient Instructions   Discussed with patient to continue present course with PT.    Patient did have a CBC and BMP drawn today.  We will follow these results.  Did advise the patient to increase spironolactone to daily for the next 3 days to help with fluid retention.  He reports being up 10 pounds since hospitalization.  Continue daily weights.  Advised to call PCP or cardiologist with continued increasing weight gain or if he should develop any shortness of breath.    Larger pair of DEDE hose were provided in office today and patient was encouraged to wear these when out of bed.    Patient does have follow-up appointment scheduled for 10/18/2022.    Patient was given instructions and counseling regarding his condition or for health maintenance advice. Please see specific information pulled into the AVS if appropriate.

## 2022-10-18 ENCOUNTER — OFFICE VISIT (OUTPATIENT)
Dept: ORTHOPEDIC SURGERY | Facility: CLINIC | Age: 72
End: 2022-10-18

## 2022-10-18 VITALS — HEIGHT: 72 IN | BODY MASS INDEX: 35.21 KG/M2 | WEIGHT: 260 LBS

## 2022-10-18 DIAGNOSIS — M25.551 RIGHT HIP PAIN: Primary | ICD-10-CM

## 2022-10-18 DIAGNOSIS — Z96.641 AFTERCARE FOLLOWING RIGHT HIP JOINT REPLACEMENT SURGERY: ICD-10-CM

## 2022-10-18 DIAGNOSIS — Z47.1 AFTERCARE FOLLOWING RIGHT HIP JOINT REPLACEMENT SURGERY: ICD-10-CM

## 2022-10-18 PROCEDURE — 99024 POSTOP FOLLOW-UP VISIT: CPT | Performed by: PHYSICIAN ASSISTANT

## 2022-10-18 RX ORDER — CLINDAMYCIN HYDROCHLORIDE 300 MG/1
300 CAPSULE ORAL 3 TIMES DAILY
Qty: 21 CAPSULE | Refills: 0 | Status: SHIPPED | OUTPATIENT
Start: 2022-10-18 | End: 2022-10-25

## 2022-10-18 NOTE — PATIENT INSTRUCTIONS
X-rays taken and reviewed, showing intact hardware.    Patient is having dental pain. Prescription for clindamycin sent to pharmacy. Advised to call dentist for evaluation. Please call our office if any dental work is advised. Encouraged probiotic use.     Staples removed in office and Steri-Strips applied.  Patient educated on incision care.  Please keep incision clean and dry.  Do not soak or submerge in water until incision is fully healed.  Do not apply creams or lotions over the incision.  Please allow Steri-Strips to fall off on their own within 7 to 10 days.    Continue icing and elevation of the knee as needed to help with pain and swelling.  Ice knee up to 3 or 4 times daily for no longer than 15 to 20 minutes at a time.    Order for outpatient PT sent to progress ROM, strength.    Follow-up in 4 weeks. Repeat x-rays not needed at this visit.  Please call with questions or concerns.

## 2022-10-18 NOTE — PROGRESS NOTES
"Chief Complaint  Follow-up of the Right Hip    Subjective      Toñito Kuo presents to [facility] for follow-up of right total hip arthroplasty with anterior approach performed on 10/3/2022 by Dr. Hawley.  He presents today independently ambulatory without use of assistive device.  He was previously seen on 10/13/2022 by myself with concern for 10 pound weight gain and severe bilateral lower extremity, worsened postoperatively.  Apparently, during his hospitalization Diovan was held secondary to mild LUIS ANGEL on CKD.  Patient also reported that he had recently changed spironolactone from daily to 3 times per week on Mondays, Wednesdays, and Friday.  He was instructed to take spironolactone daily x3 days and then resume usual schedule.  He presents today for follow-up reporting significant improvement in bilateral lower extremity edema and stating that he has lost the 10 pounds that he had gained.  He is very pleased with his postoperative right hip recovery.  States he is participating in home health PT and performing home exercises additionally.  Does report that he is scheduled to be discharged from home health PT next week.  Of note, patient is complaining of left posterior molar dental pain.  Reports this has been present for approximately 1 week.  He does have a filling to this tooth.  He has been rinsing with peroxide, which he reports has provided some relief.    Objective   Allergies   Allergen Reactions   • Penicillins Rash       Vital Signs:   Ht 182.9 cm (72\")   Wt 118 kg (260 lb)   BMI 35.26 kg/m²       Physical Exam    Constitutional: Awake, alert. Well nourished appearance.    Integumentary: Warm, dry, intact. No obvious rashes.    HENT: Atraumatic, normocephalic.   Respiratory: Non labored respirations .   Cardiovascular: Intact peripheral pulses.    Psychiatric: Normal mood and affect. A&O X3    Ortho Exam  Right hip: Staples removed in office.  Surgical incision is " visualized and is clean, dry, and intact without any evidence of wound dehiscence, surrounding erythema, warmth, or drainage.  Fully weightbearing with nonantalgic gait noted.  Good strength to hip flexors, extensors, abductors, and adductors.  Plantarflexion and dorsiflexion of the ankle.  Sensation intact to light touch.  Distal neurovascular intact.    HEENT: Oral mucosa is normal.  He does have evidence of dental caries.  There is overall dental carry noted to the left posterior molar, which appears intact.  There is no evidence of abscess formation.  No trismus.    Imaging Results (Most Recent)     Procedure Component Value Units Date/Time    XR Hip With or Without Pelvis 2 - 3 View Right [[id number]] Resulted: 10/18/22 1235     Updated: 10/18/22 1236    Narrative:      X-Ray Report:  Study: X-rays ordered, taken in the office, and reviewed today.   Site: Right hip/pelvis Xray  Indication: LEONARD  View: AP and Lateral view(s)  Findings: Intact left total hip arthroplasty. No evidence of hardware   malfunction or loosening.   Prior studies available for comparison: yes                       Assessment and Plan   Problem List Items Addressed This Visit    None  Visit Diagnoses     Right hip pain    -  Primary    Relevant Orders    XR Hip With or Without Pelvis 2 - 3 View Right (Completed)    Aftercare following right hip joint replacement surgery        Relevant Orders    Ambulatory Referral to Physical Therapy POST OP (Completed)    Ambulatory Referral to Physical Therapy POST OP          Follow Up   Return in about 4 weeks (around 11/15/2022).    Patient Instructions   X-rays taken and reviewed, showing intact hardware.    Patient is having dental pain. Prescription for clindamycin sent to pharmacy. Advised to call dentist for evaluation. Please call our office if any dental work is advised. Encouraged probiotic use.     Staples removed in office and Steri-Strips applied.  Patient educated on incision care.  Please  keep incision clean and dry.  Do not soak or submerge in water until incision is fully healed.  Do not apply creams or lotions over the incision.  Please allow Steri-Strips to fall off on their own within 7 to 10 days.    Continue icing and elevation of the knee as needed to help with pain and swelling.  Ice knee up to 3 or 4 times daily for no longer than 15 to 20 minutes at a time.    Order for outpatient PT sent to progress ROM, strength.    Follow-up in 4 weeks. Repeat x-rays not needed at this visit.  Please call with questions or concerns.      Patient was given instructions and counseling regarding his condition or for health maintenance advice. Please see specific information pulled into the AVS if appropriate.

## 2022-10-24 ENCOUNTER — TREATMENT (OUTPATIENT)
Dept: PHYSICAL THERAPY | Facility: CLINIC | Age: 72
End: 2022-10-24

## 2022-10-24 DIAGNOSIS — R29.898 WEAKNESS OF RIGHT LOWER EXTREMITY: ICD-10-CM

## 2022-10-24 DIAGNOSIS — R26.9 GAIT DISTURBANCE: ICD-10-CM

## 2022-10-24 DIAGNOSIS — Z47.1 AFTERCARE FOLLOWING RIGHT HIP JOINT REPLACEMENT SURGERY: Primary | ICD-10-CM

## 2022-10-24 DIAGNOSIS — Z96.641 AFTERCARE FOLLOWING RIGHT HIP JOINT REPLACEMENT SURGERY: Primary | ICD-10-CM

## 2022-10-24 PROCEDURE — 97161 PT EVAL LOW COMPLEX 20 MIN: CPT | Performed by: PHYSICAL THERAPIST

## 2022-10-24 RX ORDER — METFORMIN HYDROCHLORIDE 500 MG/1
TABLET, EXTENDED RELEASE ORAL
Qty: 180 TABLET | Refills: 1 | Status: SHIPPED | OUTPATIENT
Start: 2022-10-24

## 2022-10-24 NOTE — PROGRESS NOTES
Physical Therapy Initial Evaluation and Plan of Care  [address] [place] [id number]    Patient: Toñito Kuo   : 1950  Diagnosis/ICD-10 Code:  Aftercare following right hip joint replacement surgery [Z47.1, Z96.641]  Referring practitioner: Mirna Cordero PA-C  Date of Initial Visit: 10/24/2022  Today's Date: 10/24/2022  Patient seen for 1 sessions           Subjective Questionnaire: LEFS: 37/80 - 40-59%      Subjective Evaluation    History of Present Illness  Mechanism of injury: Pt presents to PT s/p R hip replacement on 10/03/2022. Pt presents to PT with no AD. Pt has been receiving home health PT since surgery and was discharged on Thursday, 10/20/2022. Pt reports his L hip also has arthritis and he will have to have it replaced at some point as well. Pt reports putting his socks and shoes with laces on is still very challenging in addition to ascending/descending stairs reciprocally. Pt's biggest goal is to improve strength as he reports his strength has decreased significantly over the last 1-2 years.       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     Additional Hip Observation Details  Pt has one staple left in R incision that he reports he has already contacted orthopedic about and will be going to office after PT evaluation to be removed. Pt's incision is closed and no signs and symptoms of infection.     Active Range of Motion   Left Hip   Flexion: WFL  Extension: WFL  Abduction:  WFL  Adduction: WFL    Right Hip   Flexion: WFL  Extension: WFL  Abduction: WFL  Adduction: WFL    Additional Active Range of Motion Details  Pt has no limitations with R hip ROM.     Strength/Myotome Testing     Left Hip   Planes of Motion   Flexion: 4+  Extension: 4+  Abduction: 4+  Adduction: 4+    Right Hip   Planes of Motion   Flexion: 4-  Extension: 4-  Abduction: 4-  Adduction: 4-    Left Knee   Flexion: 5  Extension: 5    Right Knee   Flexion: 5  Extension: 5    Ambulation     Observational Gait     Additional Observational Gait Details  Pt ambulates with no AD with slight antalgic gait and decrease in stance time on R LE.         See Exercise, Manual, and Modality Logs for complete treatment.       Assessment & Plan     Assessment  Impairments: abnormal gait, abnormal muscle firing, abnormal muscle tone, activity intolerance, impaired balance, impaired physical strength, lacks appropriate home exercise program, pain with function and weight-bearing intolerance  Functional Limitations: lifting, walking, uncomfortable because of pain and standing  Assessment details: Pt presents to PT s/p R total hip replacement on 10/03/2022. Pt has limited gait mechanics, decrease in strength, and limited functional mobility such as ascending/descending stairs and donning and doffing shoes. Reviewed patient's HEP he has been performing. Will initiate outpatient PT in order to address patient's deficits in order to return patient back to maximum function.  Prognosis: good    Goals  Plan Goals: HIP PROBLEMS:    1. The patient complains of right hip pain.    LTG 1: 12 weeks: The patient will report a pain rating of 0/10 or better in order to improve tolerance to activities of daily living and improve sleep quality.    STATUS: New    STG 1a: 6 weeks: The patient will report a pain rating of 2/10 or better.    STATUS: New    2. The patient demonstrates weakness of the right hip.    LTG 2: 12 weeks: The patient will demonstrate  5/5 strength for right hip flexion, abduction, and extension in order to improve hip stability.    STATUS: New    STG 2a: 6 weeks: The patient will demonstrate 4+/5 strength for right hip flexion, abduction, and extension.    STATUS: New    3. The patient has gait dysfunction.    LTG 3: 12 weeks: The patient will ambulate without assistive device, independently, for community distances with minimal limp to the right lower extremity in order to improve mobility and allow patient to perform activities such as grocery shopping with greater ease.    STATUS: New    STG 3a: The patient will be independent in HEP.    STATUS: New    Mobility: Walking/Moving Around Functional Limitation    LTG 4: 12 weeks: The patient will demonstrate 1-19% limitation by achieving a score of 65-79 on the Lower Extremity Functional Scale.    STATUS: New    STG 4a: 6 weeks: The patient will demonstrate 20-39% limitation by achieving a score of 49-64 on the Lower Extremity Functional Scale.    STATUS: New      Plan  Therapy options: will be seen for skilled therapy services  Planned modality interventions: cryotherapy and TENS  Planned therapy interventions: manual therapy, neuromuscular re-education, ADL retraining, balance/weight-bearing training, flexibility, functional ROM exercises, gait training, home exercise program, joint mobilization, soft tissue mobilization, strengthening, stretching, therapeutic activities, abdominal trunk stabilization, spinal/joint mobilization and postural training  Frequency: 2x week  Duration in weeks: 12  Treatment plan discussed with: patient        History # of Personal Factors and/or Comorbidities: LOW (0)  Examination of Body System(s): # of elements: LOW (1-2)  Clinical Presentation: STABLE   Clinical Decision Making: LOW       Timed:         Manual Therapy:    0     mins  40725;     Therapeutic Exercise:    0     mins  15439;     Neuromuscular Jessica:    0    mins  92837;    Therapeutic Activity:     0      mins  19719;     Gait Trainin     mins  73317;     Ultrasound:     0     mins  03936;    Ionto                               0    mins   72251  Self pay                         0     mins [id number]    Un-Timed:  Electrical Stimulation:    0     mins  12646 ( )  Traction     0     mins 25522  Low Eval     35     Mins  58805  Mod Eval     0     Mins  81221  High Eval                       0     Mins  18595  Self Pay Eval                 0     PTSP1   Re-Eval                           0    mins  67755        Timed Treatment:   0   mins   Total Treatment:     35   mins    PT SIGNATURE: Electronically signed by Mina Dodd PT  [place] LICENSE: [id number]    DATE TREATMENT INITIATED: 10/24/2022    Initial Certification  Certification Period: 10/24/2022 thru 2023  I certify that the therapy services are furnished while this patient is under my care.  The services outlined above are required by this patient, and will be reviewed every 90 days.     PHYSICIAN: Mirna Cordero PA-C   NPI: [id number]      DATE:     Please sign and return via fax to [phone] Thank you, [facility].

## 2022-10-27 ENCOUNTER — TREATMENT (OUTPATIENT)
Dept: PHYSICAL THERAPY | Facility: CLINIC | Age: 72
End: 2022-10-27

## 2022-10-27 ENCOUNTER — OFFICE VISIT (OUTPATIENT)
Dept: ORTHOPEDIC SURGERY | Facility: CLINIC | Age: 72
End: 2022-10-27

## 2022-10-27 VITALS — OXYGEN SATURATION: 97 % | WEIGHT: 242.6 LBS | BODY MASS INDEX: 32.86 KG/M2 | HEIGHT: 72 IN | HEART RATE: 93 BPM

## 2022-10-27 DIAGNOSIS — R26.9 GAIT DISTURBANCE: ICD-10-CM

## 2022-10-27 DIAGNOSIS — Z96.641 AFTERCARE FOLLOWING RIGHT HIP JOINT REPLACEMENT SURGERY: Primary | ICD-10-CM

## 2022-10-27 DIAGNOSIS — M25.532 LEFT WRIST PAIN: Primary | ICD-10-CM

## 2022-10-27 DIAGNOSIS — Z47.1 AFTERCARE FOLLOWING RIGHT HIP JOINT REPLACEMENT SURGERY: Primary | ICD-10-CM

## 2022-10-27 DIAGNOSIS — M19.032 OSTEOARTHRITIS OF LEFT WRIST, UNSPECIFIED OSTEOARTHRITIS TYPE: ICD-10-CM

## 2022-10-27 DIAGNOSIS — R29.898 WEAKNESS OF RIGHT LOWER EXTREMITY: ICD-10-CM

## 2022-10-27 PROCEDURE — 99213 OFFICE O/P EST LOW 20 MIN: CPT | Performed by: ORTHOPAEDIC SURGERY

## 2022-10-27 PROCEDURE — 97110 THERAPEUTIC EXERCISES: CPT | Performed by: PHYSICAL THERAPIST

## 2022-10-27 PROCEDURE — 97140 MANUAL THERAPY 1/> REGIONS: CPT | Performed by: PHYSICAL THERAPIST

## 2022-10-27 RX ORDER — DICLOFENAC SODIUM 75 MG/1
75 TABLET, DELAYED RELEASE ORAL 2 TIMES DAILY
Qty: 60 TABLET | Refills: 1 | Status: SHIPPED | OUTPATIENT
Start: 2022-10-27

## 2022-10-27 NOTE — PROGRESS NOTES
"Chief Complaint  Initial Evaluation and Pain of the Left Hand and Initial Evaluation and Pain of the Left Wrist     Subjective      Toñito Kuo presents to [facility] for hand and wrist. Patient had hip surgery was 3-4 weeks ago and incision is well no drainage. In the last year he has had issues with thumb not working well.  Now to the point hand and wrist are painful and decrease strength in hand.  No noted injury.  At night pain keeps him awake no matter what position.     Allergies   Allergen Reactions   • Penicillins Rash        Social History     Socioeconomic History   • Marital status:    Tobacco Use   • Smoking status: Never   • Smokeless tobacco: Never   Vaping Use   • Vaping Use: Never used   Substance and Sexual Activity   • Alcohol use: Yes     Comment: OCC   • Drug use: Never   • Sexual activity: Defer        Review of Systems     Objective   Vital Signs:   Pulse 93   Ht 182.9 cm (72.01\")   Wt 110 kg (242 lb 9.6 oz)   SpO2 97%   BMI 32.90 kg/m²       Physical Exam  Constitutional:       Appearance: Normal appearance. Patient is well-developed and normal weight.   HENT:      Head: Normocephalic.      Right Ear: Hearing and external ear normal.      Left Ear: Hearing and external ear normal.      Nose: Nose normal.   Eyes:      Conjunctiva/sclera: Conjunctivae normal.   Cardiovascular:      Rate and Rhythm: Normal rate.   Pulmonary:      Effort: Pulmonary effort is normal.      Breath sounds: No wheezing or rales.   Abdominal:      Palpations: Abdomen is soft.      Tenderness: There is no abdominal tenderness.   Musculoskeletal:      Cervical back: Normal range of motion.   Skin:     Findings: No rash.   Neurological:      Mental Status: Patient is alert and oriented to person, place, and time.   Psychiatric:         Mood and Affect: Mood and affect normal.         Judgment: Judgment normal.       Ortho Exam      LEFT WRIST AND HAND  Loss of movement in left " hand and wrist. Full , thumb opposition, MCP flexors, DIP flexors and PIP flexors. Wrist sensation to light touch for medial, ulnar and radial nerve. Radial pulse 2+. Ulnar pulse 2+.     Procedures      Imaging Results (Most Recent)     Procedure Component Value Units Date/Time    XR Wrist 2 View Left [[id number]] Resulted: 10/27/22 1054     Updated: 10/27/22 1056           Result Review :     X-Ray Report:  Left wrist(s) X-Ray  Indication: Evaluation of left wrist  AP/Lateral view(s)  Findings: Medial wrist has moderate arthritis.  Lateral wrist has advanced arthritis.   Prior studies available for comparison: No       Assessment and Plan     Diagnoses and all orders for this visit:    1. Left wrist pain (Primary)  -     XR Wrist 2 View Left    2. Osteoarthritis of left wrist, unspecified osteoarthritis type        Discussed conservative measures as exercises, anti-inflammatory medication and injection. Discussed brace for comfort. Voltaren cream, add physical therapy to adjust treatment plan to involve hand therapy and issued a brace.     Call or return if worsening symptoms.    Follow Up     PRN    Patient was given instructions and counseling regarding his condition or for health maintenance advice. Please see specific information pulled into the AVS if appropriate.     Scribed for Ravinder Hawley MD by Katie Dalal MA.  10/27/22   10:48 EDT    I have personally performed the services described in this document as scribed by the above individual and it is both accurate and complete. Ravinder Hawley MD 10/27/22

## 2022-10-27 NOTE — PROGRESS NOTES
Physical Therapy Daily Treatment Note      Patient: Toñito Kuo   : 1950  Referring practitioner: Mirna Cordero PA-C  Date of Initial Visit: Type: THERAPY  Noted: 10/24/2022  Today's Date: 10/27/2022  Patient seen for 2 sessions           Subjective Questionnaire:       Subjective Evaluation    History of Present Illness    Subjective comment: Pt ambulates without AD and when asked about pain pt stated r hip pain is low.       Objective   See Exercise, Manual, and Modality Logs for complete treatment.       Assessment & Plan     Assessment    Assessment details: Pt tolerated progressed exercises well.  Pt reports difficulty with donning his socks and with bending over.  Pt did state the bending over may be secondary to his back.  Pt tolerated r HIP PROM well.  Continue with POC.        Visit Diagnoses:    ICD-10-CM ICD-9-CM   1. Aftercare following right hip joint replacement surgery  Z47.1 V54.81    Z96.641 V43.64   2. Gait disturbance  R26.9 781.2   3. Weakness of right lower extremity  R29.898 729.89       Progress per Plan of Care and Progress strengthening /stabilization /functional activity           Timed:  Manual Therapy:    8     mins  58692;  Therapeutic Exercise:    21     mins  33825;     Neuromuscular Jessica:        mins  83288;    Therapeutic Activity:          mins  66031;     Gait Training:           mins  06487;     Ultrasound:          mins  10860;    Electrical Stimulation:         mins  01124 ( );  Aquatic Therapy          mins  40951    Untimed:  Electrical Stimulation:         mins  47699 ( );  Mechanical Traction:         mins  87362;     Timed Treatment:  29   mins   Total Treatment:     29    mins    Electronically signed    Jennie Diaz PTA  Physical Therapist Assistant    [place] license: [id number]

## 2022-10-31 ENCOUNTER — TREATMENT (OUTPATIENT)
Dept: PHYSICAL THERAPY | Facility: CLINIC | Age: 72
End: 2022-10-31

## 2022-10-31 DIAGNOSIS — M19.032 OSTEOARTHRITIS OF LEFT WRIST, UNSPECIFIED OSTEOARTHRITIS TYPE: ICD-10-CM

## 2022-10-31 DIAGNOSIS — M25.532 LEFT WRIST PAIN: Primary | ICD-10-CM

## 2022-10-31 DIAGNOSIS — Z47.1 AFTERCARE FOLLOWING RIGHT HIP JOINT REPLACEMENT SURGERY: Primary | ICD-10-CM

## 2022-10-31 DIAGNOSIS — Z96.641 AFTERCARE FOLLOWING RIGHT HIP JOINT REPLACEMENT SURGERY: Primary | ICD-10-CM

## 2022-10-31 DIAGNOSIS — R26.9 GAIT DISTURBANCE: ICD-10-CM

## 2022-10-31 DIAGNOSIS — R29.898 WEAKNESS OF RIGHT LOWER EXTREMITY: ICD-10-CM

## 2022-10-31 PROCEDURE — 97530 THERAPEUTIC ACTIVITIES: CPT | Performed by: PHYSICAL THERAPIST

## 2022-10-31 PROCEDURE — 97110 THERAPEUTIC EXERCISES: CPT | Performed by: PHYSICAL THERAPIST

## 2022-10-31 NOTE — PROGRESS NOTES
Outpatient Physical Therapy  [address] [facility], [place] [address]                            Physical Therapy Daily Treatment Note    Patient: Toñito Kuo   : 1950  Diagnosis/ICD-10 Code:  Aftercare following right hip joint replacement surgery [Z47.1, Z96.641]  Referring practitioner: Mirna Cordero PA-C  Date of Initial Visit: Type: THERAPY  Noted: 10/24/2022  Today's Date: 10/31/2022  Patient seen for 3 sessions             Subjective   Toñito Kuo reports: little to no pain in the hip, taking a Tylenol occasionally. States that the hip is not waking him up at night.     Objective   No complaints of increased pain or discomfort.    See Exercise, Manual, and Modality Logs for complete treatment.     Assessment/Plan  Toñito progressing as evident by decreased overall hip pain. Pt tolerated exercises well, no complaints of increased pain or discomfort. Pt would benefit from skilled PT to address Range of Motion  and Strength deficits, pain management and any concerns with ADLs.         Progress per Plan of Care         Timed:  Manual Therapy:         mins  88528;  Therapeutic Exercise:    20     mins  31478;     Neuromuscular Jessica:        mins  68175;    Therapeutic Activity:     10     mins  17153;     Gait Training:           mins  76943;    Aquatic Therapy:          mins  16464;       Untimed:  Electrical Stimulation:         mins  96221 ( );  Mechanical Traction:         mins  48483;       Timed Treatment:   30   mins   Total Treatment:     30   mins      Electronically signed:   Neena Dupree PTA  Physical Therapist Assistant  [facility] License #: [id number]

## 2022-11-02 ENCOUNTER — TREATMENT (OUTPATIENT)
Dept: PHYSICAL THERAPY | Facility: CLINIC | Age: 72
End: 2022-11-02

## 2022-11-02 DIAGNOSIS — Z96.641 AFTERCARE FOLLOWING RIGHT HIP JOINT REPLACEMENT SURGERY: Primary | ICD-10-CM

## 2022-11-02 DIAGNOSIS — Z47.1 AFTERCARE FOLLOWING RIGHT HIP JOINT REPLACEMENT SURGERY: Primary | ICD-10-CM

## 2022-11-02 DIAGNOSIS — R26.9 GAIT DISTURBANCE: ICD-10-CM

## 2022-11-02 DIAGNOSIS — R29.898 WEAKNESS OF RIGHT LOWER EXTREMITY: ICD-10-CM

## 2022-11-02 PROCEDURE — 97110 THERAPEUTIC EXERCISES: CPT | Performed by: PHYSICAL THERAPIST

## 2022-11-02 PROCEDURE — 97530 THERAPEUTIC ACTIVITIES: CPT | Performed by: PHYSICAL THERAPIST

## 2022-11-02 NOTE — PROGRESS NOTES
Physical Therapy Daily Note  [address] [place] [address]    VISIT#: 4    Subjective   Toñito Kuo reports 0/10 pain today. Pt reports he was able to put his socks on for the first time yesterday but reports it is still very challenging and he has been trying to stretch his hip to improve mobility.       Objective     See Exercise, Manual, and Modality Logs for complete treatment.     Assessment/Plan     Added resisted IR and ER in order to improve strength in hip and patient tolerated well provided theraband to continue exercises at home. Progressed some of patient's strengthening exercises and patient tolerated well with no reports of increase in pain. Will continue to progress patient as able.       Progress per Plan of Care and Progress strengthening /stabilization /functional activity            Timed:                 Manual Therapy:    0     mins  04058;     Therapeutic Exercise:    18     mins  75476;     Neuromuscular Jessica:    0    mins  68193;    Therapeutic Activity:     12     mins  01681;     Gait Trainin     mins  35493;     Ultrasound:     0     mins  32375;    Ionto                               0    mins   06728  Self pay                         0     mins [email or web address]    Un-Timed:  Electrical Stimulation:    0     mins  77839 ( )  Canalith Repos    0     mins 53858  Dry Needling     0     mins self-pay  Traction     0     mins 84984    Timed Treatment:   30   mins   Total Treatment:     30   mins    Mina Dodd PT  Physical Therapist    PT SIGNATURE: Electronically signed by Mina Dodd PT  [place] LICENSE: [id number]

## 2022-11-07 ENCOUNTER — TREATMENT (OUTPATIENT)
Dept: PHYSICAL THERAPY | Facility: CLINIC | Age: 72
End: 2022-11-07

## 2022-11-07 DIAGNOSIS — Z96.641 AFTERCARE FOLLOWING RIGHT HIP JOINT REPLACEMENT SURGERY: Primary | ICD-10-CM

## 2022-11-07 DIAGNOSIS — R29.898 WEAKNESS OF RIGHT LOWER EXTREMITY: ICD-10-CM

## 2022-11-07 DIAGNOSIS — R26.9 GAIT DISTURBANCE: ICD-10-CM

## 2022-11-07 DIAGNOSIS — Z47.1 AFTERCARE FOLLOWING RIGHT HIP JOINT REPLACEMENT SURGERY: Primary | ICD-10-CM

## 2022-11-07 PROCEDURE — 97110 THERAPEUTIC EXERCISES: CPT | Performed by: PHYSICAL THERAPIST

## 2022-11-07 PROCEDURE — 97530 THERAPEUTIC ACTIVITIES: CPT | Performed by: PHYSICAL THERAPIST

## 2022-11-07 NOTE — PROGRESS NOTES
Physical Therapy Daily Note  [address] [place] [address]    VISIT#: 5    Subjective   Toñito Kuo reports 0/10. Pt reports he is able to put on his socks now independently with just a little increase in difficulty.       Objective     See Exercise, Manual, and Modality Logs for complete treatment.     Assessment/Plan    Continued with strengthening exercises, increasing reps with all exercises and patient tolerated well with no difficulty. Will continue to progress patient as able to improve strength but patient is nearing discharge due to great progress.     Progress per Plan of Care and Progress strengthening /stabilization /functional activity            Timed:                 Manual Therapy:    0     mins  79146;     Therapeutic Exercise:    16     mins  55681;     Neuromuscular Jessica:    0    mins  65772;    Therapeutic Activity:     14     mins  62404;     Gait Trainin     mins  34793;     Ultrasound:     0     mins  53546;    Ionto                               0    mins   76002  Self pay                         0     mins [email or web address]    Un-Timed:  Electrical Stimulation:    0     mins  41664 ( )  Canalith Repos    0     mins 68975  Dry Needling     0     mins self-pay  Traction     0     mins 93853    Timed Treatment:   30   mins   Total Treatment:     30   mins    Mina Dodd PT  Physical Therapist    PT SIGNATURE: Electronically signed by Mina Dodd PT  [place] LICENSE: [id number]

## 2022-11-11 ENCOUNTER — TREATMENT (OUTPATIENT)
Dept: PHYSICAL THERAPY | Facility: CLINIC | Age: 72
End: 2022-11-11

## 2022-11-11 ENCOUNTER — OFFICE VISIT (OUTPATIENT)
Dept: INTERNAL MEDICINE | Facility: CLINIC | Age: 72
End: 2022-11-11
Payer: MEDICARE

## 2022-11-11 VITALS
DIASTOLIC BLOOD PRESSURE: 64 MMHG | SYSTOLIC BLOOD PRESSURE: 124 MMHG | HEIGHT: 72 IN | OXYGEN SATURATION: 96 % | HEART RATE: 66 BPM | TEMPERATURE: 97.6 F | BODY MASS INDEX: 34.54 KG/M2 | WEIGHT: 255 LBS

## 2022-11-11 DIAGNOSIS — Z47.1 AFTERCARE FOLLOWING RIGHT HIP JOINT REPLACEMENT SURGERY: Primary | ICD-10-CM

## 2022-11-11 DIAGNOSIS — I48.0 PAROXYSMAL ATRIAL FIBRILLATION: Chronic | ICD-10-CM

## 2022-11-11 DIAGNOSIS — M79.89 LEG SWELLING: ICD-10-CM

## 2022-11-11 DIAGNOSIS — R26.9 GAIT DISTURBANCE: ICD-10-CM

## 2022-11-11 DIAGNOSIS — E11.65 TYPE 2 DIABETES MELLITUS WITH HYPERGLYCEMIA, WITHOUT LONG-TERM CURRENT USE OF INSULIN: Primary | ICD-10-CM

## 2022-11-11 DIAGNOSIS — N18.31 STAGE 3A CHRONIC KIDNEY DISEASE: ICD-10-CM

## 2022-11-11 DIAGNOSIS — Z96.641 AFTERCARE FOLLOWING RIGHT HIP JOINT REPLACEMENT SURGERY: Primary | ICD-10-CM

## 2022-11-11 DIAGNOSIS — R29.898 WEAKNESS OF RIGHT LOWER EXTREMITY: ICD-10-CM

## 2022-11-11 DIAGNOSIS — D64.9 ANEMIA, UNSPECIFIED TYPE: ICD-10-CM

## 2022-11-11 PROCEDURE — 99214 OFFICE O/P EST MOD 30 MIN: CPT | Performed by: STUDENT IN AN ORGANIZED HEALTH CARE EDUCATION/TRAINING PROGRAM

## 2022-11-11 PROCEDURE — 97110 THERAPEUTIC EXERCISES: CPT | Performed by: PHYSICAL THERAPIST

## 2022-11-11 PROCEDURE — 97530 THERAPEUTIC ACTIVITIES: CPT | Performed by: PHYSICAL THERAPIST

## 2022-11-11 NOTE — PROGRESS NOTES
Physical Therapy Daily Treatment Note      Patient: Toñito Kuo   : 1950  Referring practitioner: Mirna Cordero PA-C  Date of Initial Visit: Type: THERAPY  Noted: 10/24/2022  Today's Date: 2022  Patient seen for 6 sessions           Subjective Questionnaire:       Subjective Evaluation    History of Present Illness    Subjective comment: Pt repdorts 0/10 pain in R hip.  Pt reports no change since last visit.       Objective   See Exercise, Manual, and Modality Logs for complete treatment.       Assessment & Plan     Assessment    Assessment details: Pt tolerates strengthening well without report of increased pain.  Pt has a small opening at proximal incision site where pt reports a staple was left when initial staples were removed.  Pt tolerated passive stretching well.          Visit Diagnoses:    ICD-10-CM ICD-9-CM   1. Aftercare following right hip joint replacement surgery  Z47.1 V54.81    Z96.641 V43.64   2. Gait disturbance  R26.9 781.2   3. Weakness of right lower extremity  R29.898 729.89       Progress per Plan of Care and Anticipate DC next Visit            Timed:  Manual Therapy:    6     mins  00110;  Therapeutic Exercise:    29     mins  53813;     Neuromuscular Jessica:        mins  22375;    Therapeutic Activity:     11     mins  39322;     Gait Training:           mins  96591;     Ultrasound:          mins  04080;    Electrical Stimulation:         mins  88484 ( );  Aquatic Therapy          mins  02260    Untimed:  Electrical Stimulation:         mins  32411 ( );  Mechanical Traction:         mins  17857;     Timed Treatment:   46   mins   Total Treatment:     46   mins    Electronically signed    Jennie Diaz PTA  Physical Therapist Assistant    [place] license: [id number]

## 2022-11-11 NOTE — PROGRESS NOTES
Chief Complaint  swollen feet and ankles    Subjective            Toñito Kuo presents to [facility]  History of Present Illness     Last seen in Dec 2021.     Bilateral swelling of ankles and feet:  Ongoing for past few months and persisting.   Denies any injury to ankles and feet.   Denies any recent long distance travel.   He is s/p right hip replacement in oct 2022.     Type II Diabetes:   Chronic and in good control.   Last A1c of 6.4 in Sept 2022.       Past Medical History:   Diagnosis Date   • Bladder disease    • CAD (coronary artery disease) 11/09/2016    hx    • Cancer (HCC)     SKIN CANCER BASAL LEFT EYE AND EAR  AND SQAMOUS CELL RIGHT SIDE TORSO   • Chest pain     DENIES ANY RECENT CP/SOA. FOLLOWED BY DR MARCELINO AND TRANSITION TO DR WHIPPLE. REPORTS HAS [address]AND AND WORKS ON IT   • Coronary atherosclerosis of native coronary vessel 03/29/2015   • Diabetes mellitus (HCC)     type 2. DOES NOT CHECK BS DAILY   • GERD (gastroesophageal reflux disease)    • Hard of hearing     HEARS BETTER OUT OF LEFT EAR. BILATERAL HEARING AID   • Heart attack (HCC)    • HTN (hypertension)    • Hyperlipemia    • Osteoarthritis     BILATERAL HIPS   • Paroxysmal atrial fibrillation (HCC) 08/11/2021   • Seasonal allergies    • Sleep apnea     over 10 yrs ago   • Stage 3a chronic kidney disease (HCC) 08/11/2021       Allergies:   Allergies   Allergen Reactions   • Penicillins Rash          Past Surgical History:   Procedure Laterality Date   • APPENDECTOMY      1957   • COLONOSCOPY      diverticulosis   • CORONARY ARTERY BYPASS GRAFT      2/2011   • HERNIA REPAIR     • TESTICLE SURGERY  1974   • TOTAL HIP ARTHROPLASTY Right 10/3/2022    Procedure: TOTAL HIP ARTHROPLASTY ANTERIOR RIGHT;  Surgeon: Ravinder Hawley MD;  Location: [facility] MA[facility] OR;  Service: Orthopedics;  Laterality: Right;          Social History     Socioeconomic History   • Marital status:    Tobacco  Use   • Smoking status: Never   • Smokeless tobacco: Never   Vaping Use   • Vaping Use: Never used   Substance and Sexual Activity   • Alcohol use: Yes     Comment: OCC   • Drug use: Never   • Sexual activity: Defer         Family History   Problem Relation Age of Onset   • Melanoma Mother    • Breast cancer Sister    • Colon cancer Maternal Grandmother    • Diabetes Maternal Grandmother    • Lung cancer Maternal Grandfather    • Breast cancer Paternal Grandmother    • Malig Hyperthermia Neg Hx           Health Maintenance Due   Topic Date Due   • COLORECTAL CANCER SCREENING  Never done   • ZOSTER VACCINE (2 of 3) 07/25/2013   • HEPATITIS C SCREENING  Never done   • DIABETIC FOOT EXAM  Never done            Current Outpatient Medications:   •  amLODIPine (NORVASC) 2.5 MG tablet, TAKE 1 TABLET EVERY DAY, Disp: 90 tablet, Rfl: 2  •  diclofenac (VOLTAREN) 75 MG EC tablet, Take 1 tablet by mouth 2 (Two) Times a Day., Disp: 60 tablet, Rfl: 1  •  Eliquis 5 MG tablet tablet, TAKE 1 TABLET TWICE DAILY  FOR  90  DAYS, Disp: 180 tablet, Rfl: 3  •  metFORMIN ER (GLUCOPHAGE-XR) 500 MG 24 hr tablet, TAKE 1 TABLET TWICE DAILY WITH MEALS, Disp: 180 tablet, Rfl: 1  •  metoprolol succinate XL (TOPROL-XL) 50 MG 24 hr tablet, TAKE 1 TABLET EVERY DAY, Disp: 90 tablet, Rfl: 0  •  multivitamin with minerals tablet tablet, Take 1 tablet by mouth Daily., Disp: , Rfl:   •  nitroglycerin (NITROSTAT) 0.4 MG SL tablet, Place 0.4 mg under the tongue Every 5 (Five) Minutes As Needed., Disp: , Rfl:   •  rosuvastatin (CRESTOR) 40 MG tablet, TAKE 1 TABLET EVERY DAY  (INSTEAD  OF  ATORVASTATIN) (Patient taking differently: Take 40 mg by mouth Daily.), Disp: 90 tablet, Rfl: 1  •  spironolactone (ALDACTONE) 25 MG tablet, TAKE 1 TABLET EVERY OTHER DAY, Disp: 90 tablet, Rfl: 1  •  triamcinolone (KENALOG) 0.1 % cream, 2 (Two) Times a Day As Needed., Disp: , Rfl:   •  clindamycin (Cleocin) 300 MG capsule, TAKE 2 CAPSULES 1 HOUR PRIOR TO DENTAL  "APPOINTMENT, Disp: 2 capsule, Rfl: 0  •  Diclofenac Sodium (VOLTAREN) 1 % gel gel, Apply 4 g topically to the appropriate area as directed 4 (Four) Times a Day As Needed (pain)., Disp: 150 g, Rfl: 3  •  sildenafil (Viagra) 25 MG tablet, Take 1 tablet by mouth Daily As Needed for Erectile Dysfunction., Disp: 8 tablet, Rfl: 1  •  valsartan-hydrochlorothiazide (Diovan HCT) 160-25 MG per tablet, Take 1 tablet by mouth Daily., Disp: 90 tablet, Rfl: 3      Immunization History   Administered Date(s) Administered   • COVID-19 (MODERNA) 1st, 2nd, 3rd Dose Only 01/08/2021, 02/05/2021, 11/10/2021   • COVID-19 (MODERNA) BIVALENT BOOSTER 12+YRS 11/09/2022   • COVID-19 (MODERNA) BOOSTER 04/27/2022   • Fluzone High-Dose 65+yrs 10/13/2022   • Fluzone Quad >6mos (Multi-dose) 09/25/2020   • Hepatitis A 11/25/2020   • Pneumococcal Conjugate 13-Valent (PCV13) 10/20/2015   • Pneumococcal Polysaccharide (PPSV23) 11/06/2013   • Td 01/05/2006   • Tdap 04/12/2017   • Zostavax 05/30/2013         Review of Systems   Denies chest pain, soa, ha, dizziness.     Objective       Vitals:    11/11/22 0832   BP: 124/64   BP Location: Right arm   Patient Position: Sitting   Cuff Size: Adult   Pulse: 66   Temp: 97.6 °F (36.4 °C)   TempSrc: Temporal   SpO2: 96%   Weight: 116 kg (255 lb)   Height: 182.9 cm (72\")     Body mass index is 34.58 kg/m².      Physical Exam  Vitals reviewed.   Constitutional:       Appearance: Normal appearance.   HENT:      Head: Normocephalic and atraumatic.      Nose: Nose normal.   Eyes:      Extraocular Movements: Extraocular movements intact.      Conjunctiva/sclera: Conjunctivae normal.   Cardiovascular:      Rate and Rhythm: Normal rate and regular rhythm.      Pulses: Normal pulses.      Heart sounds: Normal heart sounds.   Pulmonary:      Effort: Pulmonary effort is normal. No respiratory distress.   Musculoskeletal:         General: Swelling (bilateral legs ) present. No tenderness or signs of injury. Normal range of " motion.      Comments: No redness, warmth or rash.    Skin:     General: Skin is warm and dry.   Neurological:      General: No focal deficit present.      Mental Status: He is alert and oriented to person, place, and time.      Cranial Nerves: No cranial nerve deficit.   Psychiatric:         Mood and Affect: Mood normal.         Behavior: Behavior normal.         Thought Content: Thought content normal.       Result Review :     The following data was reviewed by: Belinda Bocanegra MD on 11/11/2022:    Common labs    Common Labs 10/5/22 10/5/22 10/13/22 10/13/22    1702 1702 1254 1254   Glucose  128 (A)  151 (A)   BUN  30 (A)  24 (A)   Creatinine  1.36 (A)  1.17   Sodium  134 (A)  137   Potassium  4.7  4.7   Chloride  99  100   Calcium  9.5  9.9   Albumin  3.80     Total Bilirubin  0.3     Alkaline Phosphatase  57     AST (SGOT)  28     ALT (SGPT)  10     WBC 9.13  10.99 (A)    Hemoglobin 8.5 (A)  8.5 (A)    Hematocrit 27.0 (A)  25.9 (A)    Platelets 258  443    Total Cholesterol       Triglycerides       HDL Cholesterol       LDL Cholesterol        Hemoglobin A1C       Microalbumin, Urine                            Assessment and Plan      Diagnoses and all orders for this visit:    1. Type 2 diabetes mellitus with hyperglycemia, without long-term current use of insulin (HCC) (Primary)  Comments:  Chronic and in good control. Due for labs which are ordered.   Orders:  -     CBC & Differential; Future  -     Comprehensive Metabolic Panel; Future  -     Lipid Panel; Future  -     TSH; Future  -     MicroAlbumin, Urine, Random - Urine, Clean Catch; Future  -     Hemoglobin A1c; Future    2. Anemia, unspecified type  Comments:  Noted on recent labs from October. Has hx of iron deficiency. Labs ordered for further eval. last colonoscopy was in 2019 w/ finidngs of external hemorrhoid and diverticulosis. Pt denies any hematochezia or melena.   Orders:  -     Iron and TIBC; Future  -     Ferritin; Future    3. Stage 3a  chronic kidney disease (HCC)  Comments:  Chronic, stable. Due for labs.     4. Leg swelling  Comments:  chronic and persisting. Labs and echocardiogram ordered for further eval. Recommend compression stockings.   Orders:  -     Adult Transthoracic Echo Complete W/ Cont if Necessary Per Protocol; Future    5. Paroxysmal atrial fibrillation (HCC)  Comments:  Chronic and stable. Pt in NSR at time of exam today. Doing well on eliquis.   Orders:  -     Adult Transthoracic Echo Complete W/ Cont if Necessary Per Protocol; Future        Follow Up     Return in about 5 weeks (around 12/16/2022) for Medicare Wellness.    Patient was given instructions and counseling regarding his condition or for health maintenance advice. Please see specific information pulled into the AVS if appropriate.     Belinda Bocanegra MD   Internal Medicine-Pediatrics

## 2022-11-22 ENCOUNTER — TREATMENT (OUTPATIENT)
Dept: PHYSICAL THERAPY | Facility: CLINIC | Age: 72
End: 2022-11-22

## 2022-11-22 DIAGNOSIS — M19.032 ARTHRITIS OF LEFT WRIST: Primary | ICD-10-CM

## 2022-11-22 DIAGNOSIS — M25.532 LEFT WRIST PAIN: ICD-10-CM

## 2022-11-22 DIAGNOSIS — R29.898 WEAKNESS OF RIGHT LOWER EXTREMITY: ICD-10-CM

## 2022-11-22 DIAGNOSIS — M25.632 STIFFNESS OF LEFT WRIST JOINT: ICD-10-CM

## 2022-11-22 DIAGNOSIS — Z47.1 AFTERCARE FOLLOWING RIGHT HIP JOINT REPLACEMENT SURGERY: Primary | ICD-10-CM

## 2022-11-22 DIAGNOSIS — R26.9 GAIT DISTURBANCE: ICD-10-CM

## 2022-11-22 DIAGNOSIS — Z96.641 AFTERCARE FOLLOWING RIGHT HIP JOINT REPLACEMENT SURGERY: Primary | ICD-10-CM

## 2022-11-22 PROCEDURE — 97166 OT EVAL MOD COMPLEX 45 MIN: CPT | Performed by: OCCUPATIONAL THERAPIST

## 2022-11-22 PROCEDURE — 97112 NEUROMUSCULAR REEDUCATION: CPT | Performed by: OCCUPATIONAL THERAPIST

## 2022-11-22 PROCEDURE — 97530 THERAPEUTIC ACTIVITIES: CPT | Performed by: OCCUPATIONAL THERAPIST

## 2022-11-22 PROCEDURE — 97110 THERAPEUTIC EXERCISES: CPT | Performed by: PHYSICAL THERAPIST

## 2022-11-22 PROCEDURE — 97530 THERAPEUTIC ACTIVITIES: CPT | Performed by: PHYSICAL THERAPIST

## 2022-11-22 NOTE — PROGRESS NOTES
Outpatient Occupational Therapy Ortho Initial Evaluation  [address] [place] [address]    Patient: Toñito Kuo   : 1950  Diagnosis/ICD-10 Code:  Arthritis of left wrist [M19.032]  Referring practitioner: Ravinder Hawley MD  Date of Initial Visit: 2022  Today's Date: 2022  Patient seen for 1 sessions               Subjective Questionnaire: QuickDASH: 38      Subjective Evaluation    History of Present Illness  Date of onset: 2019  Mechanism of injury: Left hand and wrist is in pain, difficulty gripping, decreased strength, difficulty sleeping because of pain and numbness and tingling. Wearing wristlet.  Progressively worsening pain in last 3 years with the last 6-8 months is progressively worsening pain.  Unable to  hair brush.      Patient Occupation: retired Quality of life: good    Pain  Current pain ratin (rest)  At worst pain ratin (trying to pick something up)  Location: L wrist   Quality: dull ache  Relieving factors: medications (anti-inflammatory with no improvement)  Aggravating factors: lifting and sleeping  Progression: no change    Social Support  Lives in: multiple-level home  Lives with: spouse    Hand dominance: left    Diagnostic Tests  X-ray: abnormal (osteoarthritis)    Treatments  Previous treatment: immobilization  Current treatment: immobilization  Current treatment comments: wrist brace.     Patient Goals  Patient goals for therapy: increased motion, decreased pain and increased strength           Past Medical hx: borderline diabetic, open heart surgery 10 yrs ago    Objective          Neurological Testing     Sensation     Wrist/Hand   Left   Diminished: light touch    Comments   Left light touch: 3.61 all digits     Active Range of Motion     Left Wrist   Wrist flexion: 60 degrees   Wrist extension: 20 degrees   Radial deviation: 10 degrees   Ulnar deviation: 15 degrees     Additional Active Range of Motion Details  Supination 60  Pronation  85  0-70     0-80 0-45  0-70 0-70 0-55  0-70 0-80 0-50  0-70 0-75 0-60    Strength/Myotome Testing     Left Wrist/Hand      (2nd hand position)   Left  strength (2nd hand position) 25 lbs    Right Wrist/Hand      (2nd hand position)   Right  strength (2nd hand position) 80 lbs    Swelling     Left Wrist/Hand   Circumference MCP: 22.7 cm  Circumference wrist: 20.5 cm          Assessment & Plan     Assessment  Impairments: abnormal coordination, abnormal muscle firing, abnormal or restricted ROM, activity intolerance, impaired physical strength, lacks appropriate home exercise program, pain with function, safety issue and weight-bearing intolerance  Functional Limitations: carrying objects, lifting, pulling, pushing, reaching behind back, reaching overhead and unable to perform repetitive tasks  Assessment details: Patient presents with L wrist pain secondary to osteoarthritis.  Pt reports difficulty with gripping and lifting.  Pt has stiffness in L wrist, pain, and decreased functional use.    Prognosis: fair    Goals  Plan Goals: 1. The patient complains of pain in the L wrist and digits                  LTG 1: 12 weeks:  The patient will report a pain rating of 3/10 or better in order to improve sleep quality and be able to lift items for improved tolerance to performance of activities of daily living.                                  STATUS:  New                  STG 1a: 4weeks:  The patient will report a pain rating of 5/10 or better.                                   STATUS:  New  2. The patient has limited ROM of the L wrist and digits                  LTG 2: 12 weeks:  The patient will demonstrate 120 degrees of wrist CEVALLOS and 220 digital CEVALLOS to allow the patient to  items and lift.                                  STATUS:  New                   STG 2a: 4 weeks:  The patient will demonstrate 40 degrees of wrist extension and 200 digital CEVALLOS .                                  STATUS:   New                             3. The patient has limited strength of the L UE.                  LTG 3: 12 weeks:  The patient will demonstrate 50# in order to return to functional use of L UE to open jars.                                  STATUS:  New                  STG 3a: 4 weeks:  The patient will demonstrate tolerance of light strengthening without adverse reaction.                                  STATUS:  New  4. Carrying, Moving, and Handling Objects Functional Limitation                                   LTG 4: 12 weeks:  The patient will demonstrate 1-19% limitation by achieving a score of 19 on the Quick DASH.                                  STATUS:  New                  STG 4a: 4 weeks:  The patient will demonstrate 40-59% limitation by achieving a score of 36 on the Quick DASH.                                    STATUS:  New                    TREATMENT: Orthotic fabrication/fitting/management and training, Manual therapy, therapeutic exercise, home exercise instruction, and modalities as needed to include: electrical stimulation, ultrasound, moist heat, paraffin, fluidotherapy and ice.      Plan  Planned modality interventions: TENS, thermotherapy (hydrocollator packs), thermotherapy (paraffin bath), ultrasound and electrical stimulation/Russian stimulation  Other planned modality interventions: fluidotherapy  Planned therapy interventions: manual therapy, ADL retraining, motor coordination training, neuromuscular re-education, soft tissue mobilization, fine motor coordination training, body mechanics training, balance/weight-bearing training, functional ROM exercises, flexibility, spinal/joint mobilization, strengthening, stretching, therapeutic activities, IADL retraining, joint mobilization and home exercise program  Frequency: 2x week  Duration in weeks: 12  Treatment plan discussed with: patient        Patient is indicated for skilled occupational therapy services.    History # of Personal  Factors and/or Comorbidities: MODERATE (1-2)  Examination of Body System(s): # of elements: MODERATE (3)  Clinical Presentation: EVOLVING  Clinical Decision Making: MODERATE     Evaluation:  Low Complexity:    0     mins  04751;  Mod Complexity:    20     mins  07405;  High Complexity:    0     mins  42717;    Timed:  Manual Therapy:    0     mins  62688;  Therapeutic Exercise:    10     mins  01511;  Therapeutic Activity:    10     mins  86297;     Neuromuscular Jessica:    10    mins  43312;    Ultrasound:     0     mins  73612;    Electrical Stimulation:    0     mins  38906;    Untimed:  Electrical Stimulation:    0     mins  36506 ( );  Fluidotherapy:        0    mins  03755  Paraffin:                          0    mins  48494    Timed Treatment:   30   mins   Total Treatment:     50   mins      OT SIGNATURE: Ann Marie Martin OTSAMAN, OTR/L, CHT     Electronically signed    [place] LICENSE: [id number]   DATE TREATMENT INITIATED: 11/22/2022    Initial Certification  Certification Period: 11/22/2022 thru 2/19/2023  I certify that the therapy services are furnished while this patient is under my care.  The services outlined above are required by this patient, and will be reviewed every 90 days.     Signature:______________________________________________ PHYSICIAN:  Ravinder Hawley MD   NPI: [id number]                                      DATE:    Please sign and return via fax to [phone]   Thank you, [facility] Occupational Therapy.

## 2022-11-22 NOTE — PROGRESS NOTES
Progress Assessment  [address] [place] [address]        Patient: Toñito Kuo   : 1950  Diagnosis/ICD-10 Code:  Aftercare following right hip joint replacement surgery [Z47.1, Z96.641]  Referring practitioner: Mirna Cordero PA-C  Date of Initial Visit: Type: THERAPY  Noted: 10/24/2022  Today's Date: 2022  Patient seen for 7 sessions      Subjective:   Toñito Kuo reports: 0/10  Subjective Questionnaire: LEFS: 66/80 - 1-19%  Clinical Progress: improved  Home Program Compliance: Yes  Treatment has included: therapeutic exercise, neuromuscular re-education, manual therapy, therapeutic activity and gait training    Subjective Pt reports he has had no issues since last PT session with his hip. Pt reports he is able to put on his own shoes and socks now. Pt reports he feels ready for discharge from PT.   Objective     Left Hip   Planes of Motion   Flexion: 5  Extension: 5  Abduction: 5  Adduction: 5     Right Hip   Planes of Motion   Flexion: 5  Extension: 5  Abduction: 5  Adduction: 5     Left Knee   Flexion: 5  Extension: 5     Right Knee   Flexion: 5  Extension: 5    Assessment/Plan     Pt has met all goals at this time for therapy. Pt has full strength and ROM in R hip at this time. Will be discharging patient from PT at this time to continue HEP. Educated patient to call if he has any issues.     Plan Goals: HIP PROBLEMS:    1. The patient complains of right hip pain.    LTG 1: 12 weeks: The patient will report a pain rating of 0/10 or better in order to improve tolerance to activities of daily living and improve sleep quality.    STATUS: MET    STG 1a: 6 weeks: The patient will report a pain rating of 2/10 or better.    STATUS: MET    2. The patient demonstrates weakness of the right hip.    LTG 2: 12 weeks: The patient will demonstrate 5/5 strength for right hip flexion, abduction, and extension in order to improve hip stability.    STATUS: MET    STG 2a: 6 weeks: The patient will  demonstrate 4+/5 strength for right hip flexion, abduction, and extension.    STATUS: MET    3. The patient has gait dysfunction.    LTG 3: 12 weeks: The patient will ambulate without assistive device, independently, for community distances with minimal limp to the right lower extremity in order to improve mobility and allow patient to perform activities such as grocery shopping with greater ease.    STATUS: MET    STG 3a: The patient will be independent in [facility].    STATUS: MET    Mobility: Walking/Moving Around Functional Limitation    LTG 4: 12 weeks: The patient will demonstrate 1-19% limitation by achieving a score of 65-79 on the Lower Extremity Functional Scale.    STATUS: MET    STG 4a: 6 weeks: The patient will demonstrate 20-39% limitation by achieving a score of 49-64 on the Lower Extremity Functional Scale.    STATUS: MET    Progress toward previous goals: All Met    See Exercise, Manual, and Modality Logs for complete treatment.         Recommendations: Discharge      PT SIGNATURE: Electronically signed by Mina Dodd PT  [place] LICENSE: [id number]    Based upon review of the patient's progress and continued therapy plan, it is my medical opinion that Toñito Kuo should continue physical therapy treatment at [facility] PHYSICAL THERAPY  [address] [facility] [address] [phone].      Timed:                 Manual Therapy:    0     mins  59329;     Therapeutic Exercise:    16     mins  00475;     Neuromuscular Jessica:    0    mins  27537;    Therapeutic Activity:     8     mins  36713;     Gait Trainin     mins  87327;     Ultrasound:     0     mins  47420;    Ionto                               0    mins   60690  Self pay                         0     mins [id number]    Un-Timed:  Electrical Stimulation:    0     mins  31318 ( )  Canalith Repos    0     mins 93828  Dry Needling     0     mins self-pay  Traction     0     mins 27803    Timed Treatment:    24   mins   Total Treatment:     24   mins      I certify that the therapy services are furnished while this patient is under my care.  The services outlined above are required by this patient, and will be reviewed every 90 days.

## 2022-11-28 ENCOUNTER — TELEPHONE (OUTPATIENT)
Dept: CARDIOLOGY | Facility: CLINIC | Age: 72
End: 2022-11-28

## 2022-11-28 NOTE — TELEPHONE ENCOUNTER
Patient is a previous patient of Dr Tobar scheduled to continue care with Dr Dang (Next appt 01/04/2023)     Patient's wife called inquiring about Mr Kuo's medication Valsartan/HCTZ 320-25 QD.    On 10/04/2022 it looks like Dr Mcconnell discontinued this medication upon discharge from hospital.   The patient IS still taking this med and is going to be needing a refill soon if he is to continue. Pt's wife ask that you please advise.

## 2022-11-28 NOTE — TELEPHONE ENCOUNTER
She had covid 3 days before Kyle  Will it be ok to take the covid vaccine now  If not when should she get it    This can be resumed in my opinion but I would just take a half a pill a day, and monitor the blood pressure at home.  If his blood pressure is running low we may need to come back off of it.  It was stopped because his kidney function looked worse in the hospital, but that has since resolved.

## 2022-12-06 DIAGNOSIS — Z47.1 AFTERCARE FOLLOWING RIGHT HIP JOINT REPLACEMENT SURGERY: Primary | ICD-10-CM

## 2022-12-06 DIAGNOSIS — Z96.641 AFTERCARE FOLLOWING RIGHT HIP JOINT REPLACEMENT SURGERY: Primary | ICD-10-CM

## 2022-12-06 RX ORDER — CLINDAMYCIN HYDROCHLORIDE 300 MG/1
CAPSULE ORAL
Qty: 2 CAPSULE | Refills: 0 | Status: SHIPPED | OUTPATIENT
Start: 2022-12-06

## 2022-12-07 ENCOUNTER — LAB (OUTPATIENT)
Dept: LAB | Facility: HOSPITAL | Age: 72
End: 2022-12-07

## 2022-12-07 ENCOUNTER — TREATMENT (OUTPATIENT)
Dept: PHYSICAL THERAPY | Facility: CLINIC | Age: 72
End: 2022-12-07

## 2022-12-07 DIAGNOSIS — E11.65 TYPE 2 DIABETES MELLITUS WITH HYPERGLYCEMIA, WITHOUT LONG-TERM CURRENT USE OF INSULIN: ICD-10-CM

## 2022-12-07 DIAGNOSIS — M19.032 ARTHRITIS OF LEFT WRIST: Primary | ICD-10-CM

## 2022-12-07 DIAGNOSIS — M25.632 STIFFNESS OF LEFT WRIST JOINT: ICD-10-CM

## 2022-12-07 DIAGNOSIS — D64.9 ANEMIA, UNSPECIFIED TYPE: ICD-10-CM

## 2022-12-07 DIAGNOSIS — M25.532 LEFT WRIST PAIN: ICD-10-CM

## 2022-12-07 DIAGNOSIS — E78.5 HYPERLIPIDEMIA, UNSPECIFIED HYPERLIPIDEMIA TYPE: ICD-10-CM

## 2022-12-07 LAB
ALBUMIN SERPL-MCNC: 4.4 G/DL (ref 3.5–5.2)
ALBUMIN UR-MCNC: 5.2 MG/DL
ALBUMIN/GLOB SERPL: 1.6 G/DL
ALP SERPL-CCNC: 65 U/L (ref 39–117)
ALT SERPL W P-5'-P-CCNC: 18 U/L (ref 1–41)
ANION GAP SERPL CALCULATED.3IONS-SCNC: 8 MMOL/L (ref 5–15)
AST SERPL-CCNC: 24 U/L (ref 1–40)
BASOPHILS # BLD AUTO: 0.09 10*3/MM3 (ref 0–0.2)
BASOPHILS NFR BLD AUTO: 1.6 % (ref 0–1.5)
BILIRUB SERPL-MCNC: 0.2 MG/DL (ref 0–1.2)
BUN SERPL-MCNC: 29 MG/DL (ref 8–23)
BUN/CREAT SERPL: 19.3 (ref 7–25)
CALCIUM SPEC-SCNC: 9.6 MG/DL (ref 8.6–10.5)
CHLORIDE SERPL-SCNC: 105 MMOL/L (ref 98–107)
CHOLEST SERPL-MCNC: 120 MG/DL (ref 0–200)
CO2 SERPL-SCNC: 26 MMOL/L (ref 22–29)
CREAT SERPL-MCNC: 1.5 MG/DL (ref 0.76–1.27)
DEPRECATED RDW RBC AUTO: 44.8 FL (ref 37–54)
EGFRCR SERPLBLD CKD-EPI 2021: 48.6 ML/MIN/1.73
EOSINOPHIL # BLD AUTO: 0.48 10*3/MM3 (ref 0–0.4)
EOSINOPHIL NFR BLD AUTO: 8.4 % (ref 0.3–6.2)
ERYTHROCYTE [DISTWIDTH] IN BLOOD BY AUTOMATED COUNT: 13.8 % (ref 12.3–15.4)
FERRITIN SERPL-MCNC: 87.8 NG/ML (ref 30–400)
GLOBULIN UR ELPH-MCNC: 2.7 GM/DL
GLUCOSE SERPL-MCNC: 117 MG/DL (ref 65–99)
HBA1C MFR BLD: 6.2 % (ref 4.8–5.6)
HCT VFR BLD AUTO: 32.7 % (ref 37.5–51)
HDLC SERPL-MCNC: 49 MG/DL (ref 40–60)
HGB BLD-MCNC: 10 G/DL (ref 13–17.7)
IMM GRANULOCYTES # BLD AUTO: 0.01 10*3/MM3 (ref 0–0.05)
IMM GRANULOCYTES NFR BLD AUTO: 0.2 % (ref 0–0.5)
IRON 24H UR-MRATE: 59 MCG/DL (ref 59–158)
IRON SATN MFR SERPL: 14 % (ref 20–50)
LDLC SERPL CALC-MCNC: 56 MG/DL (ref 0–100)
LDLC/HDLC SERPL: 1.15 {RATIO}
LYMPHOCYTES # BLD AUTO: 1.19 10*3/MM3 (ref 0.7–3.1)
LYMPHOCYTES NFR BLD AUTO: 20.8 % (ref 19.6–45.3)
MCH RBC QN AUTO: 27.2 PG (ref 26.6–33)
MCHC RBC AUTO-ENTMCNC: 30.6 G/DL (ref 31.5–35.7)
MCV RBC AUTO: 89.1 FL (ref 79–97)
MONOCYTES # BLD AUTO: 0.65 10*3/MM3 (ref 0.1–0.9)
MONOCYTES NFR BLD AUTO: 11.4 % (ref 5–12)
NEUTROPHILS NFR BLD AUTO: 3.3 10*3/MM3 (ref 1.7–7)
NEUTROPHILS NFR BLD AUTO: 57.6 % (ref 42.7–76)
NRBC BLD AUTO-RTO: 0 /100 WBC (ref 0–0.2)
PLATELET # BLD AUTO: 299 10*3/MM3 (ref 140–450)
PMV BLD AUTO: 9.5 FL (ref 6–12)
POTASSIUM SERPL-SCNC: 4.9 MMOL/L (ref 3.5–5.2)
PROT SERPL-MCNC: 7.1 G/DL (ref 6–8.5)
RBC # BLD AUTO: 3.67 10*6/MM3 (ref 4.14–5.8)
SODIUM SERPL-SCNC: 139 MMOL/L (ref 136–145)
TIBC SERPL-MCNC: 411 MCG/DL (ref 298–536)
TRANSFERRIN SERPL-MCNC: 276 MG/DL (ref 200–360)
TRIGL SERPL-MCNC: 74 MG/DL (ref 0–150)
TSH SERPL DL<=0.05 MIU/L-ACNC: 2.71 UIU/ML (ref 0.27–4.2)
VLDLC SERPL-MCNC: 15 MG/DL (ref 5–40)
WBC NRBC COR # BLD: 5.72 10*3/MM3 (ref 3.4–10.8)

## 2022-12-07 PROCEDURE — 84466 ASSAY OF TRANSFERRIN: CPT

## 2022-12-07 PROCEDURE — 97110 THERAPEUTIC EXERCISES: CPT | Performed by: OCCUPATIONAL THERAPIST

## 2022-12-07 PROCEDURE — 83036 HEMOGLOBIN GLYCOSYLATED A1C: CPT

## 2022-12-07 PROCEDURE — 82728 ASSAY OF FERRITIN: CPT

## 2022-12-07 PROCEDURE — 85025 COMPLETE CBC W/AUTO DIFF WBC: CPT

## 2022-12-07 PROCEDURE — 84443 ASSAY THYROID STIM HORMONE: CPT

## 2022-12-07 PROCEDURE — 80053 COMPREHEN METABOLIC PANEL: CPT

## 2022-12-07 PROCEDURE — 83540 ASSAY OF IRON: CPT

## 2022-12-07 PROCEDURE — 82043 UR ALBUMIN QUANTITATIVE: CPT

## 2022-12-07 PROCEDURE — 36415 COLL VENOUS BLD VENIPUNCTURE: CPT

## 2022-12-07 PROCEDURE — 97530 THERAPEUTIC ACTIVITIES: CPT | Performed by: OCCUPATIONAL THERAPIST

## 2022-12-07 PROCEDURE — 80061 LIPID PANEL: CPT

## 2022-12-07 NOTE — PROGRESS NOTES
Occupational Therapy Daily Treatment Note      Patient: Toñito Kuo   : 1950  Referring practitioner: Ravinder Hawley MD  Date of Initial Visit: Type: THERAPY  Noted: 2022  Today's Date: 2022  Patient seen for 2 sessions    ICD-10-CM ICD-9-CM   1. Arthritis of left wrist  M19.032 716.93   2. Left wrist pain  M25.532 719.43   3. Stiffness of left wrist joint  M25.632 719.53          Toñito Kuo reports I think the exercises are helping a little bit. /10 pain at rest         Objective   See Exercise, Manual, and Modality Logs for complete treatment.   Continued stiffness in fist with composite motion.     Assessment/Plan    Pt had increased relief with cupping this date.       Cont per POC           Timed:  Manual Therapy:    10     mins  65585;  Therapeutic Exercise:    10     mins  38177;  Therapeutic Activity:    10     mins  23576;     Neuromuscular Jessica:    0    mins  34081;    Ultrasound:     0     mins  13198;    Electrical Stimulation:    0     mins  75813;    Untimed:  Electrical Stimulation:    0     mins  00750 ( );  Fluidotherapy:        0    mins  48905  Paraffin:                          0    mins  81251    Timed Treatment:   30   mins   Total Treatment:     30   mins    OT SIGNATURE: DANNY Miller, OTR/L, CHT     Electronically signed    [place] LICENSE: [id number]

## 2022-12-08 ENCOUNTER — OFFICE VISIT (OUTPATIENT)
Dept: ORTHOPEDIC SURGERY | Facility: CLINIC | Age: 72
End: 2022-12-08

## 2022-12-08 VITALS — WEIGHT: 258 LBS | HEIGHT: 72 IN | BODY MASS INDEX: 34.95 KG/M2

## 2022-12-08 DIAGNOSIS — Z96.641 AFTERCARE FOLLOWING RIGHT HIP JOINT REPLACEMENT SURGERY: Primary | ICD-10-CM

## 2022-12-08 DIAGNOSIS — M19.032 OSTEOARTHRITIS OF LEFT WRIST, UNSPECIFIED OSTEOARTHRITIS TYPE: ICD-10-CM

## 2022-12-08 DIAGNOSIS — Z47.1 AFTERCARE FOLLOWING RIGHT HIP JOINT REPLACEMENT SURGERY: Primary | ICD-10-CM

## 2022-12-08 DIAGNOSIS — M25.532 LEFT WRIST PAIN: ICD-10-CM

## 2022-12-08 PROCEDURE — 99024 POSTOP FOLLOW-UP VISIT: CPT | Performed by: PHYSICIAN ASSISTANT

## 2022-12-08 NOTE — PATIENT INSTRUCTIONS
Patient will be traveling for the next several months. 12 week X-rays obtained today. X-rays reviewed, showing hardware intact. Continue home exercise program to progress strength and ROM. Continue icing as needed up to 3-4 times daily for no longer than 15 to 20 minutes at a time.    Continue with lifelong antibiotic prophylaxis with dental procedures following total joint replacement.    Patient requesting refill of diclofenac. Discussed with patient increased risk of NSAIDs with chronic anticoagulation with Eliquis and also in setting of CKD. Advised tylenol in place of NSAIDs. Patient verbalized agreement.     Follow-up in 9 months. Call sooner, if needed with any changes or concerns. Repeat x-rays.

## 2022-12-08 NOTE — PROGRESS NOTES
"Chief Complaint  Follow-up of the Right Hip    Subjective      Toñito Kuo presents to [facility] for follow-up of right total hip arthroplasty with anterior approach performed on 10/3/2022 by Dr. Hawley.  He presents independently ambulatory without use of assistive device.  He reports that he has been discharged from physical therapy approximately 2 weeks ago.  States he is doing \"terrific\".  He has continued with home exercise program.  Reports plans to travel from January through at least March.    Objective   Allergies   Allergen Reactions   • Penicillins Rash       Vital Signs:   Ht 182.9 cm (72\")   Wt 117 kg (258 lb)   BMI 34.99 kg/m²       Physical Exam    Constitutional: Awake, alert. Well nourished appearance.    Integumentary: Warm, dry, intact. No obvious rashes.    HENT: Atraumatic, normocephalic.   Respiratory: Non labored respirations .   Cardiovascular: Intact peripheral pulses.    Psychiatric: Normal mood and affect. A&O X3    Ortho Exam  Right hip: Well-healed surgical scar noted.  No incisional tenderness to palpation.  Skin is warm, dry, and intact.  Good strength to hip flexors, extensors, abductors, and adductors.  Full flexion extension of the knee.  Full plantarflexion and dorsiflexion of the ankle.  Sensation intact to light touch.  Distal neurovascular intact.  Smooth sit to stand transition noted.  Patient is fully weightbearing with nonantalgic gait noted.    Imaging Results (Most Recent)     Procedure Component Value Units Date/Time    XR Hip With or Without Pelvis 2 - 3 View Right [[id number]] Resulted: 12/09/22 0904     Updated: 12/09/22 0904    Narrative:      X-Ray Report:  Study: X-rays ordered, taken in the office, and reviewed today.   Site: Right hip/pelvis Xray  Indication: LEONARD  View: AP/Lateral view(s)  Findings: Intact right total hip arthroplasty.  No evidence of hardware   malfunction or loosening.  Prior studies available for comparison: yes "                     Assessment and Plan   Problem List Items Addressed This Visit    None  Visit Diagnoses     Aftercare following right hip joint replacement surgery    -  Primary    Left wrist pain        Osteoarthritis of left wrist, unspecified osteoarthritis type              Follow Up   Return in about 9 months (around 9/8/2023).  Educated on risk of smoking. Discussed options for smoking cessation.    Patient Instructions   Patient will be traveling for the next several months. 12 week X-rays obtained today. X-rays reviewed, showing hardware intact. Continue home exercise program to progress strength and ROM. Continue icing as needed up to 3-4 times daily for no longer than 15 to 20 minutes at a time.    Continue with lifelong antibiotic prophylaxis with dental procedures following total joint replacement.    Patient requesting refill of diclofenac. Discussed with patient increased risk of NSAIDs with chronic anticoagulation with Eliquis and also in setting of CKD. Advised tylenol in place of NSAIDs. Patient verbalized agreement.     Follow-up in 9 months. Call sooner, if needed with any changes or concerns. Repeat x-rays.       Patient was given instructions and counseling regarding his condition or for health maintenance advice. Please see specific information pulled into the AVS if appropriate.

## 2022-12-12 ENCOUNTER — TREATMENT (OUTPATIENT)
Dept: PHYSICAL THERAPY | Facility: CLINIC | Age: 72
End: 2022-12-12

## 2022-12-12 DIAGNOSIS — M25.532 LEFT WRIST PAIN: ICD-10-CM

## 2022-12-12 DIAGNOSIS — M19.032 ARTHRITIS OF LEFT WRIST: Primary | ICD-10-CM

## 2022-12-12 DIAGNOSIS — M25.632 STIFFNESS OF LEFT WRIST JOINT: ICD-10-CM

## 2022-12-12 PROCEDURE — 97530 THERAPEUTIC ACTIVITIES: CPT | Performed by: OCCUPATIONAL THERAPIST

## 2022-12-12 PROCEDURE — 97112 NEUROMUSCULAR REEDUCATION: CPT | Performed by: OCCUPATIONAL THERAPIST

## 2022-12-12 NOTE — PROGRESS NOTES
Occupational Therapy Daily Treatment Note      Patient: Toñito Kuo   : 1950  Referring practitioner: Ravinder Hawley MD  Date of Initial Visit: Type: THERAPY  Noted: 2022  Today's Date: 2022  Patient seen for 3 sessions    ICD-10-CM ICD-9-CM   1. Arthritis of left wrist  M19.032 716.93   2. Left wrist pain  M25.532 719.43   3. Stiffness of left wrist joint  M25.632 719.53          Toñito Kuo reports I am doing ok during the day, but I wake up in the middle of the night with my hands completely numb.        Objective   See Exercise, Manual, and Modality Logs for complete treatment.   Discussed decompression of cervical nerves as source of hand pain and numbness.  Educated to complete chin tucks, doorway stretch, proximal upper plexus glide, scapular squeezes.     Assessment/Plan  Pt educated on postural positioning to reduce pain in L hand. Pt verbalized understanding      Cont per POC           Timed:  Manual Therapy:    0     mins  16480;  Therapeutic Exercise:    10     mins  62642;  Therapeutic Activity:    10     mins  84818;     Neuromuscular Jessica:    10    mins  94888;    Ultrasound:     0     mins  25267;    Electrical Stimulation:    0     mins  97159;    Untimed:  Electrical Stimulation:    0     mins  49490 ( );  Fluidotherapy:        0    mins  73666  Paraffin:                          0    mins  13283    Timed Treatment:   30   mins   Total Treatment:     30   mins    OT SIGNATURE: DANNY Miller, OTR/L, CHT     Electronically signed    [place] LICENSE: [id number]

## 2022-12-14 ENCOUNTER — TREATMENT (OUTPATIENT)
Dept: PHYSICAL THERAPY | Facility: CLINIC | Age: 72
End: 2022-12-14

## 2022-12-14 DIAGNOSIS — M25.632 STIFFNESS OF LEFT WRIST JOINT: ICD-10-CM

## 2022-12-14 DIAGNOSIS — M25.532 LEFT WRIST PAIN: ICD-10-CM

## 2022-12-14 DIAGNOSIS — M19.032 ARTHRITIS OF LEFT WRIST: Primary | ICD-10-CM

## 2022-12-14 PROCEDURE — 97110 THERAPEUTIC EXERCISES: CPT | Performed by: OCCUPATIONAL THERAPIST

## 2022-12-14 PROCEDURE — 97530 THERAPEUTIC ACTIVITIES: CPT | Performed by: OCCUPATIONAL THERAPIST

## 2022-12-14 PROCEDURE — 97112 NEUROMUSCULAR REEDUCATION: CPT | Performed by: OCCUPATIONAL THERAPIST

## 2022-12-14 NOTE — PROGRESS NOTES
Occupational Therapy Daily Treatment Note      Patient: Toñito Kuo   : 1950  Referring practitioner: Ravinder Hawley MD  Date of Initial Visit: Type: THERAPY  Noted: 2022  Today's Date: 2022  Patient seen for 4 sessions    ICD-10-CM ICD-9-CM   1. Arthritis of left wrist  M19.032 716.93   2. Left wrist pain  M25.532 719.43   3. Stiffness of left wrist joint  M25.632 719.53          Toñito Kuo reports I had the best night of sleep I've had in a long time last night.  I want to review the exercises.      Objective   See Exercise, Manual, and Modality Logs for complete treatment.   Wrist flexion: 65 degrees   Wrist extension: 35 degrees   Radial deviation: 10 degrees   Ulnar deviation: 15 degrees     Additional Active Range of Motion Details  Supination 60  Pronation 85  0-75     0-80     0-45  0-80     0-80     0-55  0-80     0-80     0-50  0-75     0-75     0-65      (2nd hand position)   Left  strength (2nd hand position) 25 lbs    Assessment/Plan  Pt needs verbal cues to do HEP correctly for nerve glides, pec stretch, scapular retraction, and tendon glides.      Cont per POC           Timed:  Manual Therapy:    0     mins  63543;  Therapeutic Exercise:    10     mins  15841;  Therapeutic Activity:    10     mins  03019;     Neuromuscular Jessica:    18    mins  53301;    Ultrasound:     0     mins  94276;    Electrical Stimulation:    0     mins  31823;    Untimed:  Electrical Stimulation:    0     mins  01861 ( );  Fluidotherapy:        0    mins  04928  Paraffin:                          0    mins  57667    Timed Treatment:   38   mins   Total Treatment:     38   mins    OT SIGNATURE: DANNY Miller, OTR/L, CHT     Electronically signed    [place] LICENSE: [id number]

## 2022-12-19 ENCOUNTER — TREATMENT (OUTPATIENT)
Dept: PHYSICAL THERAPY | Facility: CLINIC | Age: 72
End: 2022-12-19

## 2022-12-19 DIAGNOSIS — M25.632 STIFFNESS OF LEFT WRIST JOINT: ICD-10-CM

## 2022-12-19 DIAGNOSIS — M25.532 LEFT WRIST PAIN: ICD-10-CM

## 2022-12-19 DIAGNOSIS — M19.032 ARTHRITIS OF LEFT WRIST: Primary | ICD-10-CM

## 2022-12-19 PROCEDURE — 97110 THERAPEUTIC EXERCISES: CPT | Performed by: OCCUPATIONAL THERAPIST

## 2022-12-19 PROCEDURE — 97112 NEUROMUSCULAR REEDUCATION: CPT | Performed by: OCCUPATIONAL THERAPIST

## 2022-12-19 NOTE — PROGRESS NOTES
Occupational Therapy Daily Treatment Note      Patient: Toñito Kuo   : 1950  Referring practitioner: Ravinder Hawley MD  Date of Initial Visit: Type: THERAPY  Noted: 2022  Today's Date: 2022  Patient seen for 5 sessions    ICD-10-CM ICD-9-CM   1. Arthritis of left wrist  M19.032 716.93   2. Left wrist pain  M25.532 719.43   3. Stiffness of left wrist joint  M25.632 719.53          Toñito Kuo reports I feel rush this morning. I had rough nights.      Objective   See Exercise, Manual, and Modality Logs for complete treatment.   Added strengthening this date to wrist 3 planes with fair tolerance    Assessment/Plan  Pt to add #1 to strengthening with HEP 1x/day and continue to do AROM the other 2 x/day      Cont per POC           Timed:  Manual Therapy:    5     mins  45743;  Therapeutic Exercise:    10     mins  29431;  Therapeutic Activity:    5     mins  61533;     Neuromuscular Jessica:    10    mins  56917;    Ultrasound:     0     mins  69996;    Electrical Stimulation:    0     mins  73618;    Untimed:  Electrical Stimulation:    0     mins  64018 ( );  Fluidotherapy:        0    mins  04954  Paraffin:                          0    mins  74005    Timed Treatment:   30   mins   Total Treatment:     30   mins    OT SIGNATURE: DANNY Miller, OTR/L, CHT     Electronically signed    [place] LICENSE: [id number]

## 2022-12-20 ENCOUNTER — OFFICE VISIT (OUTPATIENT)
Dept: INTERNAL MEDICINE | Facility: CLINIC | Age: 72
End: 2022-12-20

## 2022-12-20 VITALS
HEART RATE: 74 BPM | DIASTOLIC BLOOD PRESSURE: 76 MMHG | BODY MASS INDEX: 34.27 KG/M2 | SYSTOLIC BLOOD PRESSURE: 146 MMHG | HEIGHT: 72 IN | OXYGEN SATURATION: 97 % | WEIGHT: 253 LBS | TEMPERATURE: 98 F

## 2022-12-20 DIAGNOSIS — R20.2 PARESTHESIA OF LEFT UPPER EXTREMITY: ICD-10-CM

## 2022-12-20 DIAGNOSIS — Z13.31 NEGATIVE DEPRESSION SCREENING: ICD-10-CM

## 2022-12-20 DIAGNOSIS — R29.898 LEFT HAND WEAKNESS: ICD-10-CM

## 2022-12-20 DIAGNOSIS — M25.532 LEFT WRIST PAIN: ICD-10-CM

## 2022-12-20 DIAGNOSIS — Z91.81 AT LOW RISK FOR FALL: ICD-10-CM

## 2022-12-20 DIAGNOSIS — Z00.00 ENCOUNTER FOR SUBSEQUENT ANNUAL WELLNESS VISIT (AWV) IN MEDICARE PATIENT: Primary | ICD-10-CM

## 2022-12-20 PROCEDURE — G0439 PPPS, SUBSEQ VISIT: HCPCS | Performed by: STUDENT IN AN ORGANIZED HEALTH CARE EDUCATION/TRAINING PROGRAM

## 2022-12-20 PROCEDURE — 1159F MED LIST DOCD IN RCRD: CPT | Performed by: STUDENT IN AN ORGANIZED HEALTH CARE EDUCATION/TRAINING PROGRAM

## 2022-12-20 PROCEDURE — 99214 OFFICE O/P EST MOD 30 MIN: CPT | Performed by: STUDENT IN AN ORGANIZED HEALTH CARE EDUCATION/TRAINING PROGRAM

## 2022-12-20 NOTE — PROGRESS NOTES
The ABCs of the Annual Wellness Visit  Subsequent Medicare Wellness Visit    Subjective    Toñito Kuo is a 72 y.o. male who presents for a Subsequent Medicare Wellness Visit.    The following portions of the patient's history were reviewed and   updated as appropriate: allergies, current medications, past family history, past medical history, past social history, past surgical history and problem list.    Compared to one year ago, the patient feels his physical   health is the same. Had hip replacement. Mobility is better since. States he can walk better and can walk further.     Compared to one year ago, the patient feels his mental   health is the same.    Recent Hospitalizations:  He was admitted within the past 365 days at [facility] for hip surgery  hospital.       Current Medical Providers:  Patient Care Team:  Belinda Bocanegra MD as PCP - General (Internal Medicine)  Antoinette Marcelino MD as Consulting Physician (Cardiology)  CINDY Dang MD as Consulting Physician (Cardiology)  Ravinder Hawley MD as Consulting Physician (Orthopedic Surgery)    Outpatient Medications Prior to Visit   Medication Sig Dispense Refill   • amLODIPine (NORVASC) 2.5 MG tablet TAKE 1 TABLET EVERY DAY 90 tablet 2   • Eliquis 5 MG tablet tablet TAKE 1 TABLET TWICE DAILY  FOR  90  DAYS (Patient taking differently: Take 5 mg by mouth Every 12 (Twelve) Hours. LAST DOSE PM DOSE 9/29/22 PER DR MARCELINO) 180 tablet 3   • metFORMIN ER (GLUCOPHAGE-XR) 500 MG 24 hr tablet TAKE 1 TABLET TWICE DAILY WITH MEALS 180 tablet 1   • metoprolol succinate XL (TOPROL-XL) 50 MG 24 hr tablet TAKE 1 TABLET EVERY DAY 90 tablet 0   • nitroglycerin (NITROSTAT) 0.4 MG SL tablet Place 0.4 mg under the tongue Every 5 (Five) Minutes As Needed.     • rosuvastatin (CRESTOR) 40 MG tablet TAKE 1 TABLET EVERY DAY  (INSTEAD  OF  ATORVASTATIN) (Patient taking differently: Take 40 mg by mouth Daily.) 90 tablet 1   • spironolactone (ALDACTONE) 25 MG tablet TAKE 1 TABLET EVERY  "OTHER DAY 90 tablet 1   • clindamycin (Cleocin) 300 MG capsule TAKE 2 CAPSULES 1 HOUR PRIOR TO DENTAL APPOINTMENT 2 capsule 0   • diclofenac (VOLTAREN) 75 MG EC tablet Take 1 tablet by mouth 2 (Two) Times a Day. 60 tablet 1   • Diclofenac Sodium (VOLTAREN) 1 % gel gel Apply 4 g topically to the appropriate area as directed 4 (Four) Times a Day As Needed (pain). 150 g 3   • multivitamin with minerals tablet tablet Take 1 tablet by mouth Daily.     • triamcinolone (KENALOG) 0.1 % cream 2 (Two) Times a Day As Needed.       No facility-administered medications prior to visit.       No opioid medication identified on active medication list. I have reviewed chart for other potential  high risk medication/s and harmful drug interactions in the elderly.          Aspirin is not on active medication list.  Aspirin use is not indicated based on review of current medical condition/s. Risk of harm outweighs potential benefits.  .    Patient Active Problem List   Diagnosis   • Atherosclerosis of coronary artery bypass graft of native heart without angina pectoris   • Hyperlipemia   • Hypertension   • Paroxysmal atrial fibrillation (HCC)   • Pedal edema   • Stage 3a chronic kidney disease (HCC)   • Anemia   • RAUL on CPAP   • Type 2 diabetes mellitus with hyperglycemia, without long-term current use of insulin (HCC)   • Class 1 obesity   • OA (osteoarthritis) of hip   • Postoperative anemia due to acute blood loss     Advance Care Planning  Advance Directive is not on file.  ACP discussion was held with the patient during this visit. Patient has an advance directive (not in EMR), copy requested.     Objective    Vitals:    12/20/22 1031   BP: 146/76   Pulse: 74   Temp: 98 °F (36.7 °C)   SpO2: 97%   Weight: 115 kg (253 lb)   Height: 182.9 cm (72\")     Estimated body mass index is 34.31 kg/m² as calculated from the following:    Height as of this encounter: 182.9 cm (72\").    Weight as of this encounter: 115 kg (253 lb).    BMI is >= " 30 and <35. (Class 1 Obesity). The following options were offered after discussion;: exercise counseling/recommendations      Does the patient have evidence of cognitive impairment? No    Lab Results   Component Value Date    TRIG 74 12/07/2022    HDL 49 12/07/2022    LDL 56 12/07/2022    VLDL 15 12/07/2022    HGBA1C 6.20 (H) 12/07/2022        HEALTH RISK ASSESSMENT    Smoking Status:  Social History     Tobacco Use   Smoking Status Never   Smokeless Tobacco Never     Alcohol Consumption:  Social History     Substance and Sexual Activity   Alcohol Use Yes    Comment: OCC     Fall Risk Screen:    STEADI Fall Risk Assessment was completed, and patient is at LOW risk for falls.Assessment completed on:8/4/2022    Depression Screening:  PHQ-2/PHQ-9 Depression Screening 12/20/2022   Retired PHQ-9 Total Score -   Retired Total Score -   Little Interest or Pleasure in Doing Things 0-->not at all   Feeling Down, Depressed or Hopeless 0-->not at all   PHQ-9: Brief Depression Severity Measure Score 0       Health Habits and Functional and Cognitive Screening:  Functional & Cognitive Status 12/20/2022   Do you have difficulty preparing food and eating? No   Do you have difficulty bathing yourself, getting dressed or grooming yourself? No   Do you have difficulty using the toilet? No   Do you have difficulty moving around from place to place? No   Do you have trouble with steps or getting out of a bed or a chair? No   Current Diet Well Balanced Diet   Dental Exam Up to date   Eye Exam Up to date   Exercise (times per week) 7 times per week   Current Exercises Include No Regular Exercise   Do you need help using the phone?  No   Are you deaf or do you have serious difficulty hearing?  Yes   Do you need help with transportation? No   Do you need help shopping? No   Do you need help preparing meals?  No   Do you need help with housework?  No   Do you need help with laundry? No   Do you need help taking your medications? No   Do you  need help managing money? No   Do you ever drive or ride in a car without wearing a seat belt? No   Have you felt unusual stress, anger or loneliness in the last month? No   Who do you live with? Spouse   If you need help, do you have trouble finding someone available to you? No   Have you been bothered in the last four weeks by sexual problems? -   Do you have difficulty concentrating, remembering or making decisions? No       Age-appropriate Screening Schedule:  Refer to the list below for future screening recommendations based on patient's age, sex and/or medical conditions. Orders for these recommended tests are listed in the plan section. The patient has been provided with a written plan.    Health Maintenance   Topic Date Due   • ZOSTER VACCINE (2 of 3) 07/25/2013   • DIABETIC FOOT EXAM  Never done   • DIABETIC EYE EXAM  01/07/2022   • HEMOGLOBIN A1C  06/07/2023   • LIPID PANEL  12/07/2023   • URINE MICROALBUMIN  12/07/2023   • TDAP/TD VACCINES (3 - Td or Tdap) 04/12/2027   • INFLUENZA VACCINE  Completed                CMS Preventative Services Quick Reference  Risk Factors Identified During Encounter  None Identified  The above risks/problems have been discussed with the patient.  Pertinent information has been shared with the patient in the After Visit Summary.  An After Visit Summary and PPPS were made available to the patient.    Follow Up:   Next Medicare Wellness visit to be scheduled in 1 year.       Additional E&M Note during same encounter follows:  Patient has multiple medical problems which are significant and separately identifiable that require additional work above and beyond the Medicare Wellness Visit.      Chief Complaint  Medicare Wellness-subsequent and Wrist Pain (Left wrist, dr rodriguez said he has advanced arthritis in his hand and wrist )    Subjective        HPI  Toñito Kuo is also being seen today for:     Left wrist pain:   Chronic, concern for arthritis.  States he has shooting pain  "from the wrist up to his elbow.   Bad night, a couple nights ago. States laying down seems to make the pain worse, getting up and moving around does lead to improvement.   States pain does wake him up at night.     He is unable to make a fist with his left hand.   Finger extension is ok.   States he first noted that his left thumb was weak about 2 years ago, but that his left fingers have gotten worse over hte past 6 mos. Has difficulty combing his hair w/ his left hand and holding a fork w/ his left hand.   Write w/ his left hand but is ambidextrous.       Currently in physical therapy. [facility] therapist has provided neck exercises with some relief.     Planning 2.5 mos trip, w/ plan to leave in the middle of January.          Objective   Vital Signs:  /76   Pulse 74   Temp 98 °F (36.7 °C)   Ht 182.9 cm (72\")   Wt 115 kg (253 lb)   SpO2 97%   BMI 34.31 kg/m²     Physical Exam  Vitals reviewed.   Constitutional:       Appearance: Normal appearance.   HENT:      Head: Normocephalic and atraumatic.      Nose: Nose normal.   Eyes:      Extraocular Movements: Extraocular movements intact.      Conjunctiva/sclera: Conjunctivae normal.   Cardiovascular:      Rate and Rhythm: Normal rate and regular rhythm.      Pulses: Normal pulses.      Heart sounds: Normal heart sounds.   Pulmonary:      Effort: Pulmonary effort is normal. No respiratory distress.      Breath sounds: Normal breath sounds.   Musculoskeletal:         General: Normal range of motion.      Comments: Unable to make a full fist fist w/ left hand.    Skin:     General: Skin is warm and dry.   Neurological:      General: No focal deficit present.      Mental Status: He is alert and oriented to person, place, and time.      Cranial Nerves: No cranial nerve deficit.   Psychiatric:         Mood and Affect: Mood normal.         Behavior: Behavior normal.         Thought Content: Thought content normal.          The following data was reviewed by: Belinda " MD Daljit on 12/20/2022:  Common labs    Common Labs 10/5/22 10/5/22 10/13/22 10/13/22 12/7/22 12/7/22 12/7/22 12/7/22 12/7/22    1702 1702 1254 1254 1034 1034 1034 1034 1034   Glucose  128 (A)  151 (A)     117 (A)   BUN  30 (A)  24 (A)     29 (A)   Creatinine  1.36 (A)  1.17     1.50 (A)   Sodium  134 (A)  137     139   Potassium  4.7  4.7     4.9   Chloride  99  100     105   Calcium  9.5  9.9     9.6   Albumin  3.80       4.40   Total Bilirubin  0.3       0.2   Alkaline Phosphatase  57       65   AST (SGOT)  28       24   ALT (SGPT)  10       18   WBC 9.13  10.99 (A)   5.72      Hemoglobin 8.5 (A)  8.5 (A)   10.0 (A)      Hematocrit 27.0 (A)  25.9 (A)   32.7 (A)      Platelets 258  443   299      Total Cholesterol        120    Triglycerides        74    HDL Cholesterol        49    LDL Cholesterol         56    Hemoglobin A1C       6.20 (A)     Microalbumin, Urine     5.2       (A) Abnormal value                       Assessment and Plan   Diagnoses and all orders for this visit:    1. Encounter for subsequent annual wellness visit (AWV) in Medicare patient (Primary)  Comments:  At baseline state of health.     2. Left wrist pain  Comments:  Chronic, active w/ limitation in ROM as documented. In physical therapy. Recommend EMG for further evaluation given report of paresthesia.     3. Paresthesia of left upper extremity  Comments:  Chronic and active. EMG ordered for further eval.   Orders:  -     EMG & Nerve Conduction Test    4. Left hand weakness  Comments:  Chronic and persisting. EMG per above. In physical therapy. Discussed potential need for referral to hand specialist (ortho).   Orders:  -     EMG & Nerve Conduction Test    5. Negative depression screening  Comments:  repeat screen prn.     6. At low risk for fall  Comments:  no risk for fall.              Follow Up   No follow-ups on file.  Patient was given instructions and counseling regarding his condition or for health maintenance advice.  Please see specific information pulled into the AVS if appropriate.

## 2022-12-21 ENCOUNTER — TREATMENT (OUTPATIENT)
Dept: PHYSICAL THERAPY | Facility: CLINIC | Age: 72
End: 2022-12-21

## 2022-12-21 DIAGNOSIS — M19.032 ARTHRITIS OF LEFT WRIST: Primary | ICD-10-CM

## 2022-12-21 DIAGNOSIS — M25.532 LEFT WRIST PAIN: ICD-10-CM

## 2022-12-21 DIAGNOSIS — M25.632 STIFFNESS OF LEFT WRIST JOINT: ICD-10-CM

## 2022-12-21 PROCEDURE — 97110 THERAPEUTIC EXERCISES: CPT | Performed by: OCCUPATIONAL THERAPIST

## 2022-12-21 PROCEDURE — 97112 NEUROMUSCULAR REEDUCATION: CPT | Performed by: OCCUPATIONAL THERAPIST

## 2022-12-21 NOTE — PROGRESS NOTES
Occupational Therapy Daily Treatment Note      Patient: Toñito Kuo   : 1950  Referring practitioner: Ravinder Hawley MD  Date of Initial Visit: Type: THERAPY  Noted: 2022  Today's Date: 2022  Patient seen for 6 sessions    ICD-10-CM ICD-9-CM   1. Arthritis of left wrist  M19.032 716.93   2. Left wrist pain  M25.532 719.43   3. Stiffness of left wrist joint  M25.632 719.53          Toñito Kuo reports 3-4/10 pain in L wrist this date.       Objective   See Exercise, Manual, and Modality Logs for complete treatment.   Pt tolerated increased strengthening fair this date.  Pt reports putty felt good this date.     Assessment/Plan    Pt had decreased pain and increased motion this date after treatment.     Cont per POC           Timed:  Manual Therapy:    10     mins  33542;  Therapeutic Exercise:    10     mins  10020;  Therapeutic Activity:    0     mins  22012;     Neuromuscular Jessica:    10    mins  71620;    Ultrasound:     0     mins  58607;    Electrical Stimulation:    0     mins  02541;    Untimed:  Electrical Stimulation:    0     mins  88464 ( );  Fluidotherapy:        0    mins  63953  Paraffin:                          0    mins  17978    Timed Treatment:   30   mins   Total Treatment:     30   mins    OT SIGNATURE: DANNY Miller, OTR/L, CHT     Electronically signed    [place] LICENSE: [id number]

## 2022-12-27 ENCOUNTER — TREATMENT (OUTPATIENT)
Dept: PHYSICAL THERAPY | Facility: CLINIC | Age: 72
End: 2022-12-27

## 2022-12-27 DIAGNOSIS — M25.532 LEFT WRIST PAIN: ICD-10-CM

## 2022-12-27 DIAGNOSIS — M19.032 ARTHRITIS OF LEFT WRIST: Primary | ICD-10-CM

## 2022-12-27 DIAGNOSIS — M25.632 STIFFNESS OF LEFT WRIST JOINT: ICD-10-CM

## 2022-12-27 PROCEDURE — 97112 NEUROMUSCULAR REEDUCATION: CPT | Performed by: OCCUPATIONAL THERAPIST

## 2022-12-27 PROCEDURE — 97110 THERAPEUTIC EXERCISES: CPT | Performed by: OCCUPATIONAL THERAPIST

## 2022-12-27 NOTE — PROGRESS NOTES
Re-Assessment / Re-Certification  Occupational Therapy  [address] [place] [id number]      Patient: Toñito Kuo   : 1950  Diagnosis/ICD-10 Code:  Arthritis of left wrist [M19.032]  Referring practitioner: Ravinder Hawley MD  Date of Initial Visit: Type: THERAPY  Noted: 2022  Today's Date: 2022  Patient seen for 7 sessions      Subjective:   Toñito Kuo reports: I am feeling about the same.  Pain varies from nothing to 6-7/10. Sleeping is getting better, but inconsistent.   Subjective Questionnaire: QuickDASH: 32  Clinical Progress: improved  Home Program Compliance: Yes  Treatment has included: therapeutic exercise, neuromuscular re-education and manual therapy    Subjective   Objective   Active Range of Motion      Left Wrist   Wrist flexion: 65 degrees   Wrist extension: 35 degrees   Radial deviation: 10 degrees   Ulnar deviation: 25 degrees     Additional Active Range of Motion Details  Supination 65  Pronation 85  0-70     0-70     0-30  0-80     0-70     0-30  0-80     0-75     0-30  0-85     0-70     0-60     Strength/Myotome Testing      Left Wrist/Hand       (2nd hand position)   Left  strength (2nd hand position) 25 lbs     Right Wrist/Hand       (2nd hand position)   Right  strength (2nd hand position) 80 lbs     Swelling      Left Wrist/Hand   Circumference MCP: 22.7 cm  Circumference wrist: 20.5 cm     Assessment/Plan    Visit Diagnoses:    ICD-10-CM ICD-9-CM   1. Arthritis of left wrist  M19.032 716.93   2. Left wrist pain  M25.532 719.43   3. Stiffness of left wrist joint  M25.632 719.53       Progress toward previous goals: Partially Met    Plan Goals: 1. The patient complains of pain in the L wrist and digits                  LTG 1: 12 weeks:  The patient will report a pain rating of 3/10 or better in order to improve sleep quality and be able to lift items for improved tolerance to performance of activities of daily  living.                                  STATUS:  NOT MET                  STG 1a: 4weeks:  The patient will report a pain rating of 5/10 or better.                                   STATUS:  NOT MET  2. The patient has limited ROM of the L wrist and digits                  LTG 2: 12 weeks:  The patient will demonstrate 120 degrees of wrist CEVALLOS and 220 digital CEVALLOS to allow the patient to  items and lift.                                  STATUS:  NOT MET                  STG 2a: 4 weeks:  The patient will demonstrate 40 degrees of wrist extension and 200 digital CEVALLOS .                                  STATUS:  NOT MET                             3. The patient has limited strength of the L UE.                  LTG 3: 12 weeks:  The patient will demonstrate 50# in order to return to functional use of L UE to open jars.                                  STATUS:  NOT MET                  STG 3a: 4 weeks:  The patient will demonstrate tolerance of light strengthening without adverse reaction.                                  STATUS:  MET  4. Carrying, Moving, and Handling Objects Functional Limitation                                   LTG 4: 12 weeks:  The patient will demonstrate 1-19% limitation by achieving a score of 19 on the Quick DASH.                                  STATUS:  NOT MET                  STG 4a: 4 weeks:  The patient will demonstrate 40-59% limitation by achieving a score of 36 on the Quick DASH.                                    STATUS:  MET       Recommendations: Continue as planned  Timeframe: 1 month  Prognosis to achieve goals: good      OT SIGNATURE: DANNY Miller, OTR/L, CHT     Electronically signed    [place] LICENSE: [id number]       Timed:  Manual Therapy:    5     mins  03695;  Therapeutic Exercise:    10     mins  29349;  Therapeutic Activity:    5     mins  07950;     Neuromuscular Jessica:    10    mins  62067;    Ultrasound:     0     mins  65202;    Electrical Stimulation:    0     mins   54992;    Untimed:  Electrical Stimulation:    0     mins  22286 ( );  Fluidotherapy:        0    mins  11453  Paraffin:                          0    mins  33829    Timed Treatment:   30   mins   Total Treatment:     30   mins

## 2022-12-29 ENCOUNTER — TELEPHONE (OUTPATIENT)
Dept: PHYSICAL THERAPY | Facility: CLINIC | Age: 72
End: 2022-12-29

## 2023-01-03 ENCOUNTER — TELEPHONE (OUTPATIENT)
Dept: PHYSICAL THERAPY | Facility: CLINIC | Age: 73
End: 2023-01-03

## 2023-01-04 ENCOUNTER — OFFICE VISIT (OUTPATIENT)
Dept: CARDIOLOGY | Facility: CLINIC | Age: 73
End: 2023-01-04
Payer: MEDICARE

## 2023-01-04 VITALS
BODY MASS INDEX: 34.67 KG/M2 | WEIGHT: 256 LBS | DIASTOLIC BLOOD PRESSURE: 69 MMHG | HEART RATE: 69 BPM | HEIGHT: 72 IN | SYSTOLIC BLOOD PRESSURE: 137 MMHG

## 2023-01-04 DIAGNOSIS — I25.810 ATHEROSCLEROSIS OF CORONARY ARTERY BYPASS GRAFT OF NATIVE HEART WITHOUT ANGINA PECTORIS: ICD-10-CM

## 2023-01-04 DIAGNOSIS — R60.0 BILATERAL LEG EDEMA: Primary | ICD-10-CM

## 2023-01-04 DIAGNOSIS — E78.2 HYPERLIPEMIA, MIXED: ICD-10-CM

## 2023-01-04 DIAGNOSIS — I10 PRIMARY HYPERTENSION: ICD-10-CM

## 2023-01-04 DIAGNOSIS — I48.0 PAROXYSMAL ATRIAL FIBRILLATION: ICD-10-CM

## 2023-01-04 PROCEDURE — 1159F MED LIST DOCD IN RCRD: CPT | Performed by: INTERNAL MEDICINE

## 2023-01-04 PROCEDURE — 99214 OFFICE O/P EST MOD 30 MIN: CPT | Performed by: INTERNAL MEDICINE

## 2023-01-04 PROCEDURE — 1160F RVW MEDS BY RX/DR IN RCRD: CPT | Performed by: INTERNAL MEDICINE

## 2023-01-04 RX ORDER — VALSARTAN AND HYDROCHLOROTHIAZIDE 160; 25 MG/1; MG/1
1 TABLET ORAL DAILY
Qty: 90 TABLET | Refills: 3 | Status: SHIPPED | OUTPATIENT
Start: 2023-01-04 | End: 2023-03-01 | Stop reason: SDUPTHER

## 2023-01-04 RX ORDER — SILDENAFIL 25 MG/1
25 TABLET, FILM COATED ORAL DAILY PRN
Qty: 8 TABLET | Refills: 1 | Status: SHIPPED | OUTPATIENT
Start: 2023-01-04

## 2023-01-04 RX ORDER — VALSARTAN AND HYDROCHLOROTHIAZIDE 320; 25 MG/1; MG/1
TABLET, FILM COATED ORAL
COMMUNITY
End: 2023-01-04

## 2023-01-04 NOTE — PROGRESS NOTES
Chief Complaint  Atrial Fibrillation, Atherosclerosis, Hyperlipidemia, Hypertension, Chronic Kidney Disease, and Edema    Subjective            Toñito Kuo presents to [facility] CARDIOLOGY  History of Present Illness    Toñito is here for follow-up evaluation management of coronary artery disease with CABG, paroxysmal atrial fibrillation, essential hypertension, mixed hyperlipidemia, bilateral leg edema.  Since last visit from a cardiovascular standpoint he has been stable.  He did have hip replacement surgery in October.  Since then he reports somewhat increased lower extremity edema which has been a chronic problem.  He is compliant with his medical therapy.  He denies chest pain or excessive shortness of breath.    PMH  Past Medical History:   Diagnosis Date   • Bladder disease    • CAD (coronary artery disease) 11/09/2016    hx    • Cancer (HCC)     SKIN CANCER BASAL LEFT EYE AND EAR  AND SQAMOUS CELL RIGHT SIDE TORSO   • Chest pain     DENIES ANY RECENT CP/SOA. FOLLOWED BY DR MARCELINO AND TRANSITION TO DR WHIPPLE. REPORTS HAS [address] OF [address]AND AND WORKS ON IT   • Coronary atherosclerosis of native coronary vessel 03/29/2015   • Diabetes mellitus (HCC)     type 2. DOES NOT CHECK BS DAILY   • GERD (gastroesophageal reflux disease)    • Hard of hearing     HEARS BETTER OUT OF LEFT EAR. BILATERAL HEARING AID   • Heart attack (HCC)    • HTN (hypertension)    • Hyperlipemia    • Osteoarthritis     BILATERAL HIPS   • Paroxysmal atrial fibrillation (HCC) 08/11/2021   • Seasonal allergies    • Sleep apnea     over 10 yrs ago   • Stage 3a chronic kidney disease (HCC) 08/11/2021         SURGICALHX  Past Surgical History:   Procedure Laterality Date   • APPENDECTOMY      1957   • COLONOSCOPY      diverticulosis   • CORONARY ARTERY BYPASS GRAFT      2/2011   • HERNIA REPAIR     • TESTICLE SURGERY  1974   • TOTAL HIP ARTHROPLASTY Right 10/3/2022    Procedure: TOTAL HIP ARTHROPLASTY ANTERIOR RIGHT;  Surgeon:  Ravinder Hawley MD;  Location: [facility] MAIN OR;  Service: Orthopedics;  Laterality: Right;        SOC  Social History     Socioeconomic History   • Marital status:    Tobacco Use   • Smoking status: Never   • Smokeless tobacco: Never   Vaping Use   • Vaping Use: Never used   Substance and Sexual Activity   • Alcohol use: Yes     Comment: OCC   • Drug use: Never   • Sexual activity: Defer         FAMHX  Family History   Problem Relation Age of Onset   • Melanoma Mother    • Breast cancer Sister    • Colon cancer Maternal Grandmother    • Diabetes Maternal Grandmother    • Lung cancer Maternal Grandfather    • Breast cancer Paternal Grandmother    • Malig Hyperthermia Neg Hx           ALLERGY  Allergies   Allergen Reactions   • Penicillins Rash        MEDSCURRENT    Current Outpatient Medications:   •  amLODIPine (NORVASC) 2.5 MG tablet, TAKE 1 TABLET EVERY DAY, Disp: 90 tablet, Rfl: 2  •  Eliquis 5 MG tablet tablet, TAKE 1 TABLET TWICE DAILY  FOR  90  DAYS, Disp: 180 tablet, Rfl: 3  •  metFORMIN ER (GLUCOPHAGE-XR) 500 MG 24 hr tablet, TAKE 1 TABLET TWICE DAILY WITH MEALS, Disp: 180 tablet, Rfl: 1  •  metoprolol succinate XL (TOPROL-XL) 50 MG 24 hr tablet, TAKE 1 TABLET EVERY DAY, Disp: 90 tablet, Rfl: 0  •  multivitamin with minerals tablet tablet, Take 1 tablet by mouth Daily., Disp: , Rfl:   •  nitroglycerin (NITROSTAT) 0.4 MG SL tablet, Place 0.4 mg under the tongue Every 5 (Five) Minutes As Needed., Disp: , Rfl:   •  rosuvastatin (CRESTOR) 40 MG tablet, TAKE 1 TABLET EVERY DAY  (INSTEAD  OF  ATORVASTATIN) (Patient taking differently: Take 40 mg by mouth Daily.), Disp: 90 tablet, Rfl: 1  •  spironolactone (ALDACTONE) 25 MG tablet, TAKE 1 TABLET EVERY OTHER DAY, Disp: 90 tablet, Rfl: 1  •  triamcinolone (KENALOG) 0.1 % cream, 2 (Two) Times a Day As Needed., Disp: , Rfl:   •  clindamycin (Cleocin) 300 MG capsule, TAKE 2 CAPSULES 1 HOUR PRIOR TO DENTAL APPOINTMENT, Disp: 2 capsule, Rfl: 0  •  diclofenac  (VOLTAREN) 75 MG EC tablet, Take 1 tablet by mouth 2 (Two) Times a Day., Disp: 60 tablet, Rfl: 1  •  Diclofenac Sodium (VOLTAREN) 1 % gel gel, Apply 4 g topically to the appropriate area as directed 4 (Four) Times a Day As Needed (pain)., Disp: 150 g, Rfl: 3  •  sildenafil (Viagra) 25 MG tablet, Take 1 tablet by mouth Daily As Needed for Erectile Dysfunction., Disp: 8 tablet, Rfl: 1  •  valsartan-hydrochlorothiazide (Diovan HCT) 160-25 MG per tablet, Take 1 tablet by mouth Daily., Disp: 90 tablet, Rfl: 3      Review of Systems   Cardiovascular: Positive for leg swelling. Negative for chest pain and dyspnea on exertion.   Hematologic/Lymphatic: Does not bruise/bleed easily.        Objective     /69   Pulse 69   Ht 182.9 cm (72\")   Wt 116 kg (256 lb)   BMI 34.72 kg/m²       General Appearance:   · well developed  · well nourished  HENT:   · oropharynx moist  · lips not cyanotic  Neck:  · thyroid not enlarged  · supple  Respiratory:  · no respiratory distress  · normal breath sounds  · no rales  Cardiovascular:  · no jugular venous distention  · regular rhythm  · apical impulse normal  · S1 normal, S2 normal  · no S3, no S4   · no murmur  · no rub, no thrill  · carotid pulses normal; no bruit  · pedal pulses normal  · lower extremity edema: 1+ edema  Musculoskeletal:  · no clubbing of fingers.   · normocephalic, head atraumatic  Skin:   · warm, dry  Psychiatric:  · judgement and insight appropriate  · normal mood and affect      Result Review :     The following data was reviewed by: Jonn Dang MD on 01/04/2023:    CMP    CMP 10/5/22 10/13/22 12/7/22   Glucose 128 (A) 151 (A) 117 (A)   BUN 30 (A) 24 (A) 29 (A)   Creatinine 1.36 (A) 1.17 1.50 (A)   eGFR 54.6 (A) 65.4 48.6 (A)   Sodium 134 (A) 137 139   Potassium 4.7 4.7 4.9   Chloride 99 100 105   Calcium 9.5 9.9 9.6   Total Protein 7.2  7.1   Albumin 3.80  4.40   Globulin 3.4  2.7   Total Bilirubin 0.3  0.2   Alkaline Phosphatase 57  65   AST  (SGOT) 28  24   ALT (SGPT) 10  18   Albumin/Globulin Ratio 1.1  1.6   BUN/Creatinine Ratio 22.1 20.5 19.3   Anion Gap 8.3 10.0 8.0   (A) Abnormal value       Comments are available for some flowsheets but are not being displayed.           CBC    CBC 10/5/22 10/13/22 12/7/22   WBC 9.13 10.99 (A) 5.72   RBC 2.92 (A) 3.01 (A) 3.67 (A)   Hemoglobin 8.5 (A) 8.5 (A) 10.0 (A)   Hematocrit 27.0 (A) 25.9 (A) 32.7 (A)   MCV 92.5 86.0 89.1   MCH 29.1 28.2 27.2   MCHC 31.5 32.8 30.6 (A)   RDW 14.1 12.4 13.8   Platelets 258 443 299   (A) Abnormal value            Lipid Panel    Lipid Panel 4/6/22 6/13/22 12/7/22   Total Cholesterol 120 124 120   Triglycerides 76 76 74   HDL Cholesterol 53 51 49   VLDL Cholesterol 15 15 15   LDL Cholesterol  52 58 56   LDL/HDL Ratio 0.98 1.13 1.15           TSH    TSH 12/7/22   TSH 2.710             Data reviewed: Previous cardiac records reviewed     Procedures                  Assessment and Plan        ASSESSMENT:  Encounter Diagnoses   Name Primary?   • Bilateral leg edema Yes   • Paroxysmal atrial fibrillation (HCC)    • Atherosclerosis of coronary artery bypass graft of native heart without angina pectoris    • Hyperlipemia, mixed    • Primary hypertension          PLAN:    1.  Coronary artery disease with CABG-clinically stable without angina.  Continue current medical therapy  2.  Paroxysmal atrial fibrillation-clinically he seems to be maintaining sinus rhythm.  Continue anticoagulation and beta-blocker  3.  Mixed hyperlipidemia, controlled, continue statin therapy  4.  Primary hypertension-controlled, continue current medical therapy  5.  Bilateral leg edema-I am changing his valsartan to 160/25 instead of the 160/12.5 that he was currently on since hip replacement.  Additionally I ordered compression stockings.    Follow-up in about 6 months          Patient was given instructions and counseling regarding his condition or for health maintenance advice. Please see specific information  pulled into the AVS if appropriate.             C Pedro Pablo Dang MD  1/4/2023    10:48 EST

## 2023-01-05 ENCOUNTER — TREATMENT (OUTPATIENT)
Dept: PHYSICAL THERAPY | Facility: CLINIC | Age: 73
End: 2023-01-05
Payer: MEDICARE

## 2023-01-05 DIAGNOSIS — M19.032 ARTHRITIS OF LEFT WRIST: Primary | ICD-10-CM

## 2023-01-05 DIAGNOSIS — M25.632 STIFFNESS OF LEFT WRIST JOINT: ICD-10-CM

## 2023-01-05 DIAGNOSIS — M25.532 LEFT WRIST PAIN: ICD-10-CM

## 2023-01-05 PROCEDURE — 97530 THERAPEUTIC ACTIVITIES: CPT | Performed by: OCCUPATIONAL THERAPIST

## 2023-01-05 PROCEDURE — 97112 NEUROMUSCULAR REEDUCATION: CPT | Performed by: OCCUPATIONAL THERAPIST

## 2023-01-05 NOTE — PROGRESS NOTES
Occupational Therapy Daily Treatment Note      Patient: Toñito Kuo   : 1950  Referring practitioner: Ravinder Hawley MD  Date of Initial Visit: Type: THERAPY  Noted: 2022  Today's Date: 2023  Patient seen for 8 sessions    ICD-10-CM ICD-9-CM   1. Arthritis of left wrist  M19.032 716.93   2. Left wrist pain  M25.532 719.43   3. Stiffness of left wrist joint  M25.632 719.53          Toñito Kuo reports \"I feel about the same today 2/10 pain.  I am no longer having the shooting pains when I am sleeping.  The arthritis is not improving.\"         Objective   See Exercise, Manual, and Modality Logs for complete treatment.   Reviewed HEP, pt verbalized understanding of postural exercises and HEP    Assessment/Plan  Pt is leaving for an extended trip.  Pt to transition to HEP.  D/C to HEP           Timed:  Manual Therapy:    0     mins  48617;  Therapeutic Exercise:    10     mins  51044;  Therapeutic Activity:    10     mins  36367;     Neuromuscular Jessica:    10    mins  35591;    Ultrasound:     0     mins  09067;    Electrical Stimulation:    0     mins  42137;    Untimed:  Electrical Stimulation:    0     mins  99773 ( );  Fluidotherapy:        0    mins  55560  Paraffin:                          0    mins  59773    Timed Treatment:   30   mins   Total Treatment:     30   mins    OT SIGNATURE: DANNY Miller, OTR/L, CHT     Electronically signed    [place] LICENSE: [id number]

## 2023-01-09 ENCOUNTER — APPOINTMENT (OUTPATIENT)
Dept: CARDIOLOGY | Facility: HOSPITAL | Age: 73
End: 2023-01-09
Payer: MEDICARE

## 2023-02-13 RX ORDER — APIXABAN 5 MG/1
TABLET, FILM COATED ORAL
Qty: 180 TABLET | Refills: 3 | Status: SHIPPED | OUTPATIENT
Start: 2023-02-13 | End: 2023-02-28

## 2023-02-27 ENCOUNTER — TELEPHONE (OUTPATIENT)
Dept: CARDIOLOGY | Facility: CLINIC | Age: 73
End: 2023-02-27
Payer: MEDICARE

## 2023-02-27 DIAGNOSIS — N18.31 STAGE 3A CHRONIC KIDNEY DISEASE: ICD-10-CM

## 2023-02-27 RX ORDER — SPIRONOLACTONE 25 MG/1
TABLET ORAL
Qty: 90 TABLET | Refills: 3 | Status: SHIPPED | OUTPATIENT
Start: 2023-02-27 | End: 2023-02-27 | Stop reason: SDUPTHER

## 2023-02-27 RX ORDER — SPIRONOLACTONE 25 MG/1
TABLET ORAL
Qty: 90 TABLET | Refills: 3 | Status: SHIPPED | OUTPATIENT
Start: 2023-02-27

## 2023-02-28 DIAGNOSIS — I48.0 PAROXYSMAL ATRIAL FIBRILLATION: Primary | ICD-10-CM

## 2023-02-28 RX ORDER — WARFARIN SODIUM 5 MG/1
5 TABLET ORAL
Qty: 30 TABLET | Refills: 5 | Status: SHIPPED | OUTPATIENT
Start: 2023-02-28 | End: 2023-03-01 | Stop reason: ALTCHOICE

## 2023-03-01 RX ORDER — VALSARTAN AND HYDROCHLOROTHIAZIDE 160; 25 MG/1; MG/1
1 TABLET ORAL DAILY
Qty: 90 TABLET | Refills: 3 | Status: SHIPPED | OUTPATIENT
Start: 2023-03-01

## 2023-03-01 NOTE — TELEPHONE ENCOUNTER
Informed pt , he would like to stay on eliquis. They are traveling now and they would rather stay on eliquis and talk about this at the next office visit.     Pharmacy needs to be [facility].

## 2023-06-02 ENCOUNTER — TELEPHONE (OUTPATIENT)
Dept: CARDIOLOGY | Facility: CLINIC | Age: 73
End: 2023-06-02
Payer: MEDICARE

## 2023-06-02 NOTE — TELEPHONE ENCOUNTER
The [facility] received a fax that requires your attention. The document has been indexed to the patient’s chart for your review.      Reason for sending: EXTERNAL MEDICAL RECORD NOTIFICATION     Documents Description: REFILL FOR AMLODIPINE     Name of Sender: [facility]     Date Indexed: 6.1.23

## 2023-06-05 ENCOUNTER — TELEPHONE (OUTPATIENT)
Dept: CARDIOLOGY | Facility: CLINIC | Age: 73
End: 2023-06-05
Payer: MEDICARE

## 2023-06-05 NOTE — TELEPHONE ENCOUNTER
The [facility] received a fax that requires your attention. The document has been indexed to the patient’s chart for your review.      Reason for sending: REFILL REQUEST     Documents Description: REQUEST TO REFILL AMLODIPINE 2.5 MG     Name of Sender: [facility]    Date Indexed:06.05.23     Notes (if needed): PLEASE ADVISE AS CASSANDRA RENO PRESCRIBED

## 2023-06-06 RX ORDER — AMLODIPINE BESYLATE 2.5 MG/1
2.5 TABLET ORAL DAILY
Qty: 90 TABLET | Refills: 3 | Status: SHIPPED | OUTPATIENT
Start: 2023-06-06 | End: 2023-06-07 | Stop reason: SDUPTHER

## 2023-06-07 RX ORDER — AMLODIPINE BESYLATE 2.5 MG/1
2.5 TABLET ORAL DAILY
Qty: 90 TABLET | Refills: 3 | Status: SHIPPED | OUTPATIENT
Start: 2023-06-07

## 2023-06-12 ENCOUNTER — OFFICE VISIT (OUTPATIENT)
Dept: INTERNAL MEDICINE | Facility: CLINIC | Age: 73
End: 2023-06-12
Payer: MEDICARE

## 2023-06-12 VITALS
SYSTOLIC BLOOD PRESSURE: 115 MMHG | DIASTOLIC BLOOD PRESSURE: 64 MMHG | OXYGEN SATURATION: 97 % | TEMPERATURE: 97.4 F | HEART RATE: 76 BPM | BODY MASS INDEX: 34.91 KG/M2 | WEIGHT: 257.4 LBS

## 2023-06-12 DIAGNOSIS — D64.9 ANEMIA, UNSPECIFIED TYPE: ICD-10-CM

## 2023-06-12 DIAGNOSIS — R42 DIZZINESS: ICD-10-CM

## 2023-06-12 DIAGNOSIS — I10 ESSENTIAL HYPERTENSION: ICD-10-CM

## 2023-06-12 DIAGNOSIS — Z12.5 PROSTATE CANCER SCREENING: ICD-10-CM

## 2023-06-12 DIAGNOSIS — E78.5 HYPERLIPIDEMIA, UNSPECIFIED HYPERLIPIDEMIA TYPE: ICD-10-CM

## 2023-06-12 DIAGNOSIS — Z12.11 SCREENING FOR COLON CANCER: ICD-10-CM

## 2023-06-12 DIAGNOSIS — E11.65 TYPE 2 DIABETES MELLITUS WITH HYPERGLYCEMIA, WITHOUT LONG-TERM CURRENT USE OF INSULIN: Primary | ICD-10-CM

## 2023-06-12 DIAGNOSIS — I25.10 CORONARY ARTERY DISEASE INVOLVING NATIVE CORONARY ARTERY OF NATIVE HEART WITHOUT ANGINA PECTORIS: ICD-10-CM

## 2023-06-12 DIAGNOSIS — Z80.0 FAMILY HISTORY OF COLON CANCER: ICD-10-CM

## 2023-06-12 DIAGNOSIS — I48.0 PAROXYSMAL ATRIAL FIBRILLATION: ICD-10-CM

## 2023-06-12 LAB
ALBUMIN SERPL-MCNC: 4.7 G/DL (ref 3.5–5.2)
ALBUMIN/GLOB SERPL: 1.9 G/DL
ALP SERPL-CCNC: 57 U/L (ref 39–117)
ALT SERPL W P-5'-P-CCNC: 15 U/L (ref 1–41)
ANION GAP SERPL CALCULATED.3IONS-SCNC: 10 MMOL/L (ref 5–15)
AST SERPL-CCNC: 23 U/L (ref 1–40)
BASOPHILS # BLD AUTO: 0.07 10*3/MM3 (ref 0–0.2)
BASOPHILS NFR BLD AUTO: 1.2 % (ref 0–1.5)
BILIRUB SERPL-MCNC: 0.2 MG/DL (ref 0–1.2)
BUN SERPL-MCNC: 28 MG/DL (ref 8–23)
BUN/CREAT SERPL: 20.3 (ref 7–25)
CALCIUM SPEC-SCNC: 9.4 MG/DL (ref 8.6–10.5)
CHLORIDE SERPL-SCNC: 106 MMOL/L (ref 98–107)
CHOLEST SERPL-MCNC: 118 MG/DL (ref 0–200)
CO2 SERPL-SCNC: 24 MMOL/L (ref 22–29)
CREAT SERPL-MCNC: 1.38 MG/DL (ref 0.76–1.27)
DEPRECATED RDW RBC AUTO: 43 FL (ref 37–54)
EGFRCR SERPLBLD CKD-EPI 2021: 53.7 ML/MIN/1.73
EOSINOPHIL # BLD AUTO: 0.43 10*3/MM3 (ref 0–0.4)
EOSINOPHIL NFR BLD AUTO: 7.1 % (ref 0.3–6.2)
ERYTHROCYTE [DISTWIDTH] IN BLOOD BY AUTOMATED COUNT: 12.9 % (ref 12.3–15.4)
GLOBULIN UR ELPH-MCNC: 2.5 GM/DL
GLUCOSE SERPL-MCNC: 119 MG/DL (ref 65–99)
HBA1C MFR BLD: 6.4 % (ref 4.8–5.6)
HCT VFR BLD AUTO: 34.3 % (ref 37.5–51)
HDLC SERPL-MCNC: 48 MG/DL (ref 40–60)
HGB BLD-MCNC: 11.4 G/DL (ref 13–17.7)
IMM GRANULOCYTES # BLD AUTO: 0.02 10*3/MM3 (ref 0–0.05)
IMM GRANULOCYTES NFR BLD AUTO: 0.3 % (ref 0–0.5)
LDLC SERPL CALC-MCNC: 55 MG/DL (ref 0–100)
LDLC/HDLC SERPL: 1.15 {RATIO}
LYMPHOCYTES # BLD AUTO: 0.94 10*3/MM3 (ref 0.7–3.1)
LYMPHOCYTES NFR BLD AUTO: 15.5 % (ref 19.6–45.3)
MCH RBC QN AUTO: 30.4 PG (ref 26.6–33)
MCHC RBC AUTO-ENTMCNC: 33.2 G/DL (ref 31.5–35.7)
MCV RBC AUTO: 91.5 FL (ref 79–97)
MONOCYTES # BLD AUTO: 0.64 10*3/MM3 (ref 0.1–0.9)
MONOCYTES NFR BLD AUTO: 10.6 % (ref 5–12)
NEUTROPHILS NFR BLD AUTO: 3.95 10*3/MM3 (ref 1.7–7)
NEUTROPHILS NFR BLD AUTO: 65.3 % (ref 42.7–76)
NRBC BLD AUTO-RTO: 0 /100 WBC (ref 0–0.2)
PLATELET # BLD AUTO: 298 10*3/MM3 (ref 140–450)
PMV BLD AUTO: 9.6 FL (ref 6–12)
POTASSIUM SERPL-SCNC: 4.8 MMOL/L (ref 3.5–5.2)
PROT SERPL-MCNC: 7.2 G/DL (ref 6–8.5)
PSA SERPL-MCNC: 1.69 NG/ML (ref 0–4)
RBC # BLD AUTO: 3.75 10*6/MM3 (ref 4.14–5.8)
SODIUM SERPL-SCNC: 140 MMOL/L (ref 136–145)
TRIGL SERPL-MCNC: 73 MG/DL (ref 0–150)
TSH SERPL DL<=0.05 MIU/L-ACNC: 2.73 UIU/ML (ref 0.27–4.2)
VLDLC SERPL-MCNC: 15 MG/DL (ref 5–40)
WBC NRBC COR # BLD: 6.05 10*3/MM3 (ref 3.4–10.8)

## 2023-06-12 PROCEDURE — 80053 COMPREHEN METABOLIC PANEL: CPT | Performed by: NURSE PRACTITIONER

## 2023-06-12 PROCEDURE — 84466 ASSAY OF TRANSFERRIN: CPT | Performed by: NURSE PRACTITIONER

## 2023-06-12 PROCEDURE — 80061 LIPID PANEL: CPT | Performed by: NURSE PRACTITIONER

## 2023-06-12 PROCEDURE — 83036 HEMOGLOBIN GLYCOSYLATED A1C: CPT | Performed by: NURSE PRACTITIONER

## 2023-06-12 PROCEDURE — G0103 PSA SCREENING: HCPCS | Performed by: NURSE PRACTITIONER

## 2023-06-12 PROCEDURE — 82728 ASSAY OF FERRITIN: CPT | Performed by: NURSE PRACTITIONER

## 2023-06-12 PROCEDURE — 83540 ASSAY OF IRON: CPT | Performed by: NURSE PRACTITIONER

## 2023-06-12 PROCEDURE — 85025 COMPLETE CBC W/AUTO DIFF WBC: CPT | Performed by: NURSE PRACTITIONER

## 2023-06-12 PROCEDURE — 84443 ASSAY THYROID STIM HORMONE: CPT | Performed by: NURSE PRACTITIONER

## 2023-06-12 NOTE — ASSESSMENT & PLAN NOTE
Well controlled, continue metformin. Most recent HgbA1c 6.2. Patient should monitor blood glucose levels closely and call or return to clinic with consistent elevations or frequent lows. Repeat labs in clinic today. Diabetic eye exam: 2023. Diabetic foot exam: Today. Urine microalbumin: 12/2022. Renal protection: ARB. Pneumonia vaccination: Today.

## 2023-06-12 NOTE — ASSESSMENT & PLAN NOTE
Neurological exam without concern. Patient without red flag symptoms. Orthostatic vital signs today indicated drop in heart rate with position changes. Discussed the potential for medication adjustments to prevent symptoms, however due to patients upcoming follow up with cardiology he will await to discuss further with Dr. Dang. Discussed checking his blood pressure and blood sugar when these symptoms occur. He should proceed directly to the ED with severe/persistent dizziness, changes in vision/speech/hearing/gait or worst headache of his life. Could consider carotid doppler in the future.

## 2023-06-12 NOTE — ASSESSMENT & PLAN NOTE
Concern for orthostatic hypotension, patient to discuss further with cardiology. For now continue amlodipine, valsartan-HCTZ, spironolactone, metoprolol.

## 2023-06-12 NOTE — PROGRESS NOTES
Chief Complaint  Diabetes (Follow up ) and Fall (Pt would like to talk about a fall that happened about a month ago/Pt was dizzy when he fell )    Subjective         Toñito Kuo presents to [facility]  HPI     Patient states he fell a few weeks ago. 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 States he got dizzy, spun and fell down. He did not hit his head, just hit the floor. States he has been paying more attention since then, states there will be times when he bends down quickly or if he is focusing on something up he will feel a little unstable. He has not checked his blood pressure or blood sugar during these times. Patient with history of CABG in 2011, monitored closely by cardiology. Scheduled for follow up in two weeks. Denies chest pain, shortness of breath, diaphoresis. Also with atrial fibrillation. Currently managed with amlodipine, valsartan-HCTZ, spironolactone, Eliquis, metoprolol. Patient denies changes in vision/speech/hearing/gait or worst headache of his life. Denies syncope.    DM2-  Currently managed with metformin. He does not check his blood glucose levels at home. Most recent A1c 6.2. Diabetic eye exam: 2023. Diabetic foot exam: Due. Urine microalbumin: 12/2022. Renal protection: ARB. Pneumonia vaccination: Due.    HLD-  Managed with statin. Well tolerated, denies leg cramping. Most recent LDL 56.    Colonoscopy: 2019, due 2024; family history of colon cancer in maternal grandmother  PSA: 2021, due  Pneumonia vaccination: Would like  Shingles vaccination: Will get at pharmacy         Objective     Vitals:    06/12/23 0923 06/12/23 1114 06/12/23 1115 06/12/23 1116   BP: 115/64      Pulse: 76      Temp: 97.4 °F (36.3 °C)      TempSrc: Temporal      SpO2: 98% 99% 99% 97%   Weight: 117 kg (257 lb 6.4 oz)         Body mass index is 34.91 kg/m².    Wt Readings from Last 3 Encounters:   06/12/23 117 kg (257 lb  6.4 oz)   01/04/23 116 kg (256 lb)   12/20/22 115 kg (253 lb)     BP Readings from Last 3 Encounters:   06/12/23 115/64   01/04/23 137/69   12/20/22 146/76                Physical Exam  Constitutional:       Appearance: Normal appearance.   HENT:      Head: Normocephalic and atraumatic.      Nose: Nose normal.      Mouth/Throat:      Mouth: Mucous membranes are moist.      Pharynx: Oropharynx is clear.   Eyes:      Extraocular Movements: Extraocular movements intact.      Conjunctiva/sclera: Conjunctivae normal.      Pupils: Pupils are equal, round, and reactive to light.   Cardiovascular:      Rate and Rhythm: Normal rate and regular rhythm.      Heart sounds: Normal heart sounds.   Pulmonary:      Effort: Pulmonary effort is normal.      Breath sounds: Normal breath sounds.   Skin:     General: Skin is warm and dry.   Neurological:      General: No focal deficit present.      Mental Status: He is alert and oriented to person, place, and time.      Cranial Nerves: Cranial nerves 2-12 are intact.      Sensory: Sensation is intact.      Motor: Motor function is intact.      Coordination: Coordination is intact.      Gait: Gait is intact.   Psychiatric:         Mood and Affect: Mood normal.         Behavior: Behavior normal.         Thought Content: Thought content normal.        Result Review :   The following data was reviewed by: ANDRES Do on 06/12/2023:      Procedures    Assessment and Plan   Diagnoses and all orders for this visit:    1. Type 2 diabetes mellitus with hyperglycemia, without long-term current use of insulin (Primary)  Assessment & Plan:  Well controlled, continue metformin. Most recent HgbA1c 6.2. Patient should monitor blood glucose levels closely and call or return to clinic with consistent elevations or frequent lows. Repeat labs in clinic today. Diabetic eye exam: 2023. Diabetic foot exam: Today. Urine microalbumin: 12/2022. Renal protection: ARB. Pneumonia vaccination:  Today.      Orders:  -     Comprehensive Metabolic Panel  -     CBC & Differential  -     TSH  -     Lipid Panel  -     Hemoglobin A1c    2. Coronary artery disease involving native coronary artery of native heart without angina pectoris  Assessment & Plan:  Continue amlodipine, valsartan-HCTZ, spironolactone, Eliquis, metoprolol.       3. Essential hypertension  Assessment & Plan:  Concern for orthostatic hypotension, patient to discuss further with cardiology. For now continue amlodipine, valsartan-HCTZ, spironolactone, metoprolol.       4. Hyperlipidemia, unspecified hyperlipidemia type  Assessment & Plan:  Well controlled, continue statin. Most recent LDL 56. Lipid panel with labs.        5. Paroxysmal atrial fibrillation  Assessment & Plan:  Continue metoprolol and Eliquis. Follow up with cardiology as scheduled.       6. Dizziness  Assessment & Plan:  Neurological exam without concern. Patient without red flag symptoms. Orthostatic vital signs today indicated drop in heart rate with position changes. Discussed the potential for medication adjustments to prevent symptoms, however due to patients upcoming follow up with cardiology he will await to discuss further with Dr. Dang. Discussed checking his blood pressure and blood sugar when these symptoms occur. He should proceed directly to the ED with severe/persistent dizziness, changes in vision/speech/hearing/gait or worst headache of his life. Could consider carotid doppler in the future.      7. Prostate cancer screening  Comments:  PSA with labs.  Orders:  -     PSA Screen    8. Screening for colon cancer  Comments:  Referral placed for colonoscopy.  Orders:  -     Ambulatory Referral For Screening Colonoscopy    9. Family history of colon cancer  -     Ambulatory Referral For Screening Colonoscopy    Other orders  -     Pneumococcal Conjugate Vaccine 20-Valent (PCV20)          Follow Up   Return in about 6 months (around 12/12/2023) for Follow up with   Belinda.  Patient was given instructions and counseling regarding his condition or for health maintenance advice. Please see specific information pulled into the AVS if appropriate.

## 2023-06-13 DIAGNOSIS — D64.9 ANEMIA, UNSPECIFIED TYPE: Primary | ICD-10-CM

## 2023-06-13 LAB
FERRITIN SERPL-MCNC: 94.3 NG/ML (ref 30–400)
IRON 24H UR-MRATE: 75 MCG/DL (ref 59–158)
IRON SATN MFR SERPL: 18 % (ref 20–50)
TIBC SERPL-MCNC: 411 MCG/DL (ref 298–536)
TRANSFERRIN SERPL-MCNC: 276 MG/DL (ref 200–360)

## 2023-06-26 ENCOUNTER — TELEPHONE (OUTPATIENT)
Dept: CARDIOLOGY | Facility: CLINIC | Age: 73
End: 2023-06-26

## 2023-06-26 NOTE — TELEPHONE ENCOUNTER
Caller: Tracey Kuo    Relationship to patient: Emergency Contact    Best call back number: [phone]    Chief complaint: RESCHEDULE APPT    Type of visit: FOLLOW UP    Requested date: ASAP     If rescheduling, when is the original appointment: 6.26.23     Additional notes: DR. SORENSEN NEXT AVAILABLE WAS IN AUGUST BUT PATIENT LEAVES OUT JULY 14TH AND WONT BE BACK UNTIL THE END OF OCTOBER AND NEEDS TO RESCHEDULE HIS APPT WITH DR. WHIPPLE BEFORE JULY 14TH.  PATIENT HAD TO CANCEL HIS APPOINTMENT TODAY DUE TO TREES BEING DOWN DUE TO STORMS AND CAN'T GET OUT.

## 2023-11-02 ENCOUNTER — OFFICE VISIT (OUTPATIENT)
Dept: ORTHOPEDIC SURGERY | Facility: CLINIC | Age: 73
End: 2023-11-02
Payer: MEDICARE

## 2023-11-02 VITALS — BODY MASS INDEX: 34.95 KG/M2 | WEIGHT: 258 LBS | HEIGHT: 72 IN

## 2023-11-02 DIAGNOSIS — Z47.1 AFTERCARE FOLLOWING RIGHT HIP JOINT REPLACEMENT SURGERY: Primary | ICD-10-CM

## 2023-11-02 DIAGNOSIS — Z96.641 AFTERCARE FOLLOWING RIGHT HIP JOINT REPLACEMENT SURGERY: Primary | ICD-10-CM

## 2023-11-02 DIAGNOSIS — Z96.641 HISTORY OF TOTAL HIP ARTHROPLASTY, RIGHT: ICD-10-CM

## 2023-11-02 PROCEDURE — 99213 OFFICE O/P EST LOW 20 MIN: CPT | Performed by: PHYSICIAN ASSISTANT

## 2023-11-02 NOTE — PROGRESS NOTES
"Chief Complaint  Pain and Follow-up of the Right Hip    Subjective      Toñito Kuo presents to [facility] for follow up of his right hip.  He is status post right total hip arthroplasty with anterior approach performed on 10/3/2022 by Dr. Hawley.  Today, he is here without the use of assistive device.  He states, he is doing well. He states his range of motion is slightly decreased, but he doesn't want to return to PT.     Allergies   Allergen Reactions    Penicillins Rash       Objective     Vital Signs:   Vitals:    11/02/23 1343   Weight: 117 kg (258 lb)   Height: 182.9 cm (72\")     Body mass index is 34.99 kg/m².    I reviewed the patient's chief complaint, history of present illness, review of systems, past medical history, surgical history, family history, social history, medications, and allergy list.     REVIEW OF SYSTEMS    Constitutional: Denies fevers, chills, weight loss  Cardiovascular: Denies chest pain, shortness of breath  Skin: Denies rashes, acute skin changes  Neurologic: Denies headache, loss of consciousness  MSK: Right hip pain.     Ortho Exam  Hip General: Alert. No acute distress.  Gait: Nonantalgic gait.    Right hip: Incision well-healed.  No pain with passive hip ROM.  Intact active hip ROM without pain.  Good strength to hip flexors, extensors, abductors, and adductors. intact active ankle dorsiflexion and plantarflexion. Intact sensation over dorsal and plantar foot. Palpable dorsalis pedis pulse. Non tender over the thigh or knee distally. Calf soft, nontender. Neurovascular intact distally.           Imaging Results (Most Recent)       Procedure Component Value Units Date/Time    XR Hip With or Without Pelvis 2 - 3 View Right [[id number]] Resulted: 11/06/23 0810     Updated: 11/06/23 0811    Narrative:      X-Ray Report:  Study: X-rays ordered, taken in the office, and reviewed today.   Site: Right hip Xray  Indication: LEONARD  View: AP/Lateral, Standing, " and [place] view(s)  Findings: Intact right total hip arthroplasty. No evidence of hardware   malfunction, loosening, subsidence, or periprosthetic fracture.   Prior studies available for comparison: yes                 Assessment and Plan     Diagnoses and all orders for this visit:    1. Aftercare following right hip joint replacement surgery (Primary)  -     XR Hip With or Without Pelvis 2 - 3 View Right    2. History of total hip arthroplasty, right       Toñito Kuo is here 12 weeks post op right total hip arthroplasty with anterior approach performed by Dr. Hawley on 10/3/2022. X-rays obtained and reviewed, showing hardware intact without complications. Incision site is well-healed. Discussed continuing to do massage over incision site to help breakdown scar tissue development. Continue home exercise program to progress strength and ROM.     Discussed prophylactic antibiotics are needed before any dental procedure. This is a lifelong situation and the patient is able to call our office for the prescription.     Follow-up in 1 year. Call sooner, if needed with any changes or concerns. Repeat x-rays.       Tobacco Use: Low Risk  (11/2/2023)    Patient History     Smoking Tobacco Use: Never     Smokeless Tobacco Use: Never     Passive Exposure: Not on file     Patient reports that they are a nonsmoker; cessation education not applicable.     Follow Up   Return in about 1 year (around 11/2/2024).  There are no Patient Instructions on file for this visit.  Patient was given instructions and counseling regarding his condition or for health maintenance advice. Please see specific information pulled into the AVS if appropriate.

## 2023-12-06 ENCOUNTER — OFFICE VISIT (OUTPATIENT)
Dept: CARDIOLOGY | Facility: CLINIC | Age: 73
End: 2023-12-06
Payer: MEDICARE

## 2023-12-06 VITALS
WEIGHT: 260 LBS | SYSTOLIC BLOOD PRESSURE: 125 MMHG | HEART RATE: 73 BPM | BODY MASS INDEX: 35.21 KG/M2 | DIASTOLIC BLOOD PRESSURE: 73 MMHG | HEIGHT: 72 IN

## 2023-12-06 DIAGNOSIS — N18.31 STAGE 3A CHRONIC KIDNEY DISEASE: Chronic | ICD-10-CM

## 2023-12-06 DIAGNOSIS — I48.0 PAROXYSMAL ATRIAL FIBRILLATION: ICD-10-CM

## 2023-12-06 DIAGNOSIS — I25.10 CORONARY ARTERY DISEASE INVOLVING NATIVE CORONARY ARTERY OF NATIVE HEART WITHOUT ANGINA PECTORIS: Primary | ICD-10-CM

## 2023-12-06 DIAGNOSIS — E11.65 TYPE 2 DIABETES MELLITUS WITH HYPERGLYCEMIA, WITHOUT LONG-TERM CURRENT USE OF INSULIN: Primary | ICD-10-CM

## 2023-12-06 DIAGNOSIS — E78.2 HYPERLIPEMIA, MIXED: ICD-10-CM

## 2023-12-06 DIAGNOSIS — I10 HYPERTENSION, ESSENTIAL: ICD-10-CM

## 2023-12-06 PROCEDURE — 1160F RVW MEDS BY RX/DR IN RCRD: CPT | Performed by: INTERNAL MEDICINE

## 2023-12-06 PROCEDURE — 3074F SYST BP LT 130 MM HG: CPT | Performed by: INTERNAL MEDICINE

## 2023-12-06 PROCEDURE — 3078F DIAST BP <80 MM HG: CPT | Performed by: INTERNAL MEDICINE

## 2023-12-06 PROCEDURE — 1159F MED LIST DOCD IN RCRD: CPT | Performed by: INTERNAL MEDICINE

## 2023-12-06 PROCEDURE — 99214 OFFICE O/P EST MOD 30 MIN: CPT | Performed by: INTERNAL MEDICINE

## 2023-12-06 RX ORDER — NITROGLYCERIN 0.4 MG/1
0.4 TABLET SUBLINGUAL
Qty: 30 TABLET | Refills: 3 | Status: SHIPPED | OUTPATIENT
Start: 2023-12-06

## 2023-12-06 RX ORDER — SILDENAFIL 50 MG/1
50 TABLET, FILM COATED ORAL DAILY PRN
Qty: 8 TABLET | Refills: 3 | Status: SHIPPED | OUTPATIENT
Start: 2023-12-06

## 2023-12-06 NOTE — PROGRESS NOTES
Chief Complaint  Coronary Artery Disease, Atrial Fibrillation, Hypertension, and Dizziness    Subjective            Toñito Kuo presents to [facility] CARDIOLOGY    Toñito is here for follow-up evaluation management of coronary artery disease with CABG, paroxysmal atrial fibrillation, essential hypertension, mixed hyperlipidemia, bilateral leg edema.  Overall he is doing well.  He denies chest pain, palpitations or excessive shortness of breath.  His lower extremity edema is better.  He has not had any dizzy spells since the last visit.  He is compliant with his medical therapy.    PMH  Past Medical History:   Diagnosis Date    Bladder disease     CAD (coronary artery disease) 11/09/2016    hx     Cancer     SKIN CANCER BASAL LEFT EYE AND EAR  AND SQAMOUS CELL RIGHT SIDE TORSO    Chest pain     DENIES ANY RECENT CP/SOA. FOLLOWED BY DR MARCELINO AND TRANSITION TO DR WHIPPLE. REPORTS HAS 35 ACRES OF LAND AND WORKS ON IT    Coronary atherosclerosis of native coronary vessel 03/29/2015    Diabetes mellitus     type 2. DOES NOT CHECK BS DAILY    GERD (gastroesophageal reflux disease)     Hard of hearing     HEARS BETTER OUT OF LEFT EAR. BILATERAL HEARING AID    Heart attack     HTN (hypertension)     Hyperlipemia     Osteoarthritis     BILATERAL HIPS    Paroxysmal atrial fibrillation 08/11/2021    Seasonal allergies     Sleep apnea     over 10 yrs ago    Stage 3a chronic kidney disease 08/11/2021         SURGICALHX  Past Surgical History:   Procedure Laterality Date    APPENDECTOMY      1957    COLONOSCOPY      diverticulosis    CORONARY ARTERY BYPASS GRAFT      2/2011    HERNIA REPAIR      SKIN CANCER EXCISION Right     TESTICLE SURGERY  1974    TOTAL HIP ARTHROPLASTY Right 10/03/2022    Procedure: TOTAL HIP ARTHROPLASTY ANTERIOR RIGHT;  Surgeon: Ravinder Hawley MD;  Location: [facility];  Service: Orthopedics;  Laterality: Right;        SOC  Social History     Socioeconomic History    Marital status:     Tobacco Use    Smoking status: Never    Smokeless tobacco: Never   Vaping Use    Vaping Use: Never used   Substance and Sexual Activity    Alcohol use: Yes     Comment: OCC    Drug use: Never    Sexual activity: Defer         FAMHX  Family History   Problem Relation Age of Onset    Melanoma Mother     Breast cancer Sister     Colon cancer Maternal Grandmother     Diabetes Maternal Grandmother     Lung cancer Maternal Grandfather     Breast cancer Paternal Grandmother     Malig Hyperthermia Neg Hx           ALLERGY  Allergies   Allergen Reactions    Penicillins Rash        MEDSCURRENT    Current Outpatient Medications:     apixaban (ELIQUIS) 5 MG tablet tablet, Take 1 tablet by mouth 2 (Two) Times a Day., Disp: 180 tablet, Rfl: 3    clindamycin (Cleocin) 300 MG capsule, TAKE 2 CAPSULES 1 HOUR PRIOR TO DENTAL APPOINTMENT, Disp: 2 capsule, Rfl: 0    diclofenac (VOLTAREN) 75 MG EC tablet, Take 1 tablet by mouth 2 (Two) Times a Day., Disp: 60 tablet, Rfl: 1    Diclofenac Sodium (VOLTAREN) 1 % gel gel, Apply 4 g topically to the appropriate area as directed 4 (Four) Times a Day As Needed (pain)., Disp: 150 g, Rfl: 3    metFORMIN ER (GLUCOPHAGE-XR) 500 MG 24 hr tablet, TAKE 1 TABLET TWICE DAILY WITH MEALS, Disp: 180 tablet, Rfl: 1    metoprolol succinate XL (TOPROL-XL) 50 MG 24 hr tablet, TAKE 1 TABLET EVERY DAY, Disp: 90 tablet, Rfl: 0    multivitamin with minerals tablet tablet, Take 1 tablet by mouth Daily., Disp: , Rfl:     nitroglycerin (NITROSTAT) 0.4 MG SL tablet, Place 1 tablet under the tongue Every 5 (Five) Minutes As Needed for Chest Pain., Disp: 30 tablet, Rfl: 3    rosuvastatin (CRESTOR) 40 MG tablet, Take 1 tablet by mouth Daily., Disp: 90 tablet, Rfl: 1    sildenafil (Viagra) 50 MG tablet, Take 1 tablet by mouth Daily As Needed for Erectile Dysfunction., Disp: 8 tablet, Rfl: 3    spironolactone (ALDACTONE) 25 MG tablet, TAKE 1 TABLET EVERY OTHER DAY, Disp: 90 tablet, Rfl: 3    triamcinolone  "(KENALOG) 0.1 % cream, 2 (Two) Times a Day As Needed., Disp: , Rfl:     valsartan-hydrochlorothiazide (Diovan HCT) 160-25 MG per tablet, Take 1 tablet by mouth Daily., Disp: 90 tablet, Rfl: 3      Review of Systems   Constitutional: Negative for malaise/fatigue.   Cardiovascular:  Negative for chest pain, dyspnea on exertion and leg swelling.   Hematologic/Lymphatic: Does not bruise/bleed easily.   Neurological:  Positive for dizziness and vertigo.        Objective     /73   Pulse 73   Ht 182.9 cm (72\")   Wt 118 kg (260 lb)   BMI 35.26 kg/m²       General Appearance:   well developed  well nourished  HENT:   oropharynx moist  lips not cyanotic  Neck:  thyroid not enlarged  supple  Respiratory:  no respiratory distress  normal breath sounds  no rales  Cardiovascular:  no jugular venous distention  regular rhythm  apical impulse normal  S1 normal, S2 normal  no S3, no S4   no murmur  no rub, no thrill  carotid pulses normal; no bruit  pedal pulses normal  lower extremity edema: 1+ edema  Musculoskeletal:  no clubbing of fingers.   normocephalic, head atraumatic  Skin:   warm, dry  Psychiatric:  judgement and insight appropriate  normal mood and affect      Result Review :     The following data was reviewed by: Jonn Dang MD on 01/04/2023:    CMP          6/12/2023    11:32   CMP   Glucose 119    BUN 28    Creatinine 1.38    EGFR 53.7    Sodium 140    Potassium 4.8    Chloride 106    Calcium 9.4    Total Protein 7.2    Albumin 4.7    Globulin 2.5    Total Bilirubin 0.2    Alkaline Phosphatase 57    AST (SGOT) 23    ALT (SGPT) 15    Albumin/Globulin Ratio 1.9    BUN/Creatinine Ratio 20.3    Anion Gap 10.0      CBC          6/12/2023    11:32   CBC   WBC 6.05    RBC 3.75    Hemoglobin 11.4    Hematocrit 34.3    MCV 91.5    MCH 30.4    MCHC 33.2    RDW 12.9    Platelets 298      Lipid Panel          6/12/2023    11:32   Lipid Panel   Total Cholesterol 118    Triglycerides 73    HDL Cholesterol " 48    VLDL Cholesterol 15    LDL Cholesterol  55    LDL/HDL Ratio 1.15      TSH          6/12/2023    11:32   TSH   TSH 2.730      Data reviewed: Previous cardiac records reviewed      Procedures                  Assessment and Plan        ASSESSMENT:  Encounter Diagnoses   Name Primary?    Coronary artery disease involving native coronary artery of native heart without angina pectoris Yes    Paroxysmal atrial fibrillation     Hypertension, essential     Hyperlipemia, mixed          PLAN:    1.  Coronary artery disease with CABG-clinically stable without angina.  Continue current medical therapy  2.  Paroxysmal atrial fibrillation-clinically he seems to be maintaining sinus rhythm.  Continue anticoagulation and beta-blocker  3.  Mixed hyperlipidemia, controlled, continue statin therapy  4.  Primary hypertension-controlled, continue current medical therapy  5.  Bilateral leg edema-better since stopping amlodipine  6.  Dizziness-resolved    Follow-up in about 10 months          Patient was given instructions and counseling regarding his condition or for health maintenance advice. Please see specific information pulled into the AVS if appropriate.             CINDY Dang MD  12/6/2023    10:06 EST

## 2023-12-07 ENCOUNTER — TELEPHONE (OUTPATIENT)
Dept: CARDIOLOGY | Facility: CLINIC | Age: 73
End: 2023-12-07
Payer: MEDICARE

## 2023-12-07 NOTE — TELEPHONE ENCOUNTER
RECEIVED REQUEST FOR CLARIFICATION FROM [facility] BY FAX    MESSAGE:  RXS FOR NITROGLYCERIN 0.4 MG SL TAB & SILDENAFIL 50 MG TAB.  THESE DRUGS MAY CAUSE SEVERE HYPOTENSION, SYNCOPE, OR MI.  PLEASE CONFIRM YOU ARE AWARE OF THIS INTERACTION AND OK TO FILL THIS COMBINATION    PLEASE ADVISE

## 2023-12-08 ENCOUNTER — TELEPHONE (OUTPATIENT)
Dept: CARDIOLOGY | Facility: CLINIC | Age: 73
End: 2023-12-08
Payer: MEDICARE

## 2023-12-08 NOTE — TELEPHONE ENCOUNTER
SILDENAFIL PA APPROVED    PA Case: [id number], Status: Approved, Coverage Starts on: 1/1/2023 12:00:00 AM, Coverage Ends on: 12/31/2024 12:00:00 AM. Questions? Contact [phone].

## 2023-12-11 ENCOUNTER — LAB (OUTPATIENT)
Dept: LAB | Facility: HOSPITAL | Age: 73
End: 2023-12-11
Payer: MEDICARE

## 2023-12-11 DIAGNOSIS — I48.0 PAROXYSMAL ATRIAL FIBRILLATION: ICD-10-CM

## 2023-12-11 LAB
ALBUMIN SERPL-MCNC: 4.5 G/DL (ref 3.5–5.2)
ALBUMIN UR-MCNC: 5.8 MG/DL
ALBUMIN/GLOB SERPL: 1.6 G/DL
ALP SERPL-CCNC: 58 U/L (ref 39–117)
ALT SERPL W P-5'-P-CCNC: 14 U/L (ref 1–41)
ANION GAP SERPL CALCULATED.3IONS-SCNC: 11.4 MMOL/L (ref 5–15)
AST SERPL-CCNC: 18 U/L (ref 1–40)
BILIRUB SERPL-MCNC: 0.3 MG/DL (ref 0–1.2)
BUN SERPL-MCNC: 21 MG/DL (ref 8–23)
BUN/CREAT SERPL: 14.7 (ref 7–25)
CALCIUM SPEC-SCNC: 9.6 MG/DL (ref 8.6–10.5)
CHLORIDE SERPL-SCNC: 105 MMOL/L (ref 98–107)
CHOLEST SERPL-MCNC: 130 MG/DL (ref 0–200)
CO2 SERPL-SCNC: 23.6 MMOL/L (ref 22–29)
CREAT SERPL-MCNC: 1.43 MG/DL (ref 0.76–1.27)
EGFRCR SERPLBLD CKD-EPI 2021: 51.1 ML/MIN/1.73
GLOBULIN UR ELPH-MCNC: 2.8 GM/DL
GLUCOSE SERPL-MCNC: 124 MG/DL (ref 65–99)
HBA1C MFR BLD: 6.2 % (ref 4.8–5.6)
HDLC SERPL-MCNC: 51 MG/DL (ref 40–60)
INR PPP: 1.08 (ref 0.86–1.15)
LDLC SERPL CALC-MCNC: 63 MG/DL (ref 0–100)
LDLC/HDLC SERPL: 1.24 {RATIO}
POTASSIUM SERPL-SCNC: 4.8 MMOL/L (ref 3.5–5.2)
PROT SERPL-MCNC: 7.3 G/DL (ref 6–8.5)
PROTHROMBIN TIME: 14.2 SECONDS (ref 11.8–14.9)
SODIUM SERPL-SCNC: 140 MMOL/L (ref 136–145)
T4 FREE SERPL-MCNC: 1.12 NG/DL (ref 0.93–1.7)
TRIGL SERPL-MCNC: 79 MG/DL (ref 0–150)
TSH SERPL DL<=0.05 MIU/L-ACNC: 2.35 UIU/ML (ref 0.27–4.2)
VLDLC SERPL-MCNC: 16 MG/DL (ref 5–40)

## 2023-12-11 PROCEDURE — 36415 COLL VENOUS BLD VENIPUNCTURE: CPT

## 2023-12-11 PROCEDURE — 84443 ASSAY THYROID STIM HORMONE: CPT | Performed by: STUDENT IN AN ORGANIZED HEALTH CARE EDUCATION/TRAINING PROGRAM

## 2023-12-11 PROCEDURE — 85610 PROTHROMBIN TIME: CPT

## 2023-12-11 PROCEDURE — 80061 LIPID PANEL: CPT | Performed by: STUDENT IN AN ORGANIZED HEALTH CARE EDUCATION/TRAINING PROGRAM

## 2023-12-11 PROCEDURE — 84439 ASSAY OF FREE THYROXINE: CPT | Performed by: STUDENT IN AN ORGANIZED HEALTH CARE EDUCATION/TRAINING PROGRAM

## 2023-12-11 PROCEDURE — 83036 HEMOGLOBIN GLYCOSYLATED A1C: CPT | Performed by: STUDENT IN AN ORGANIZED HEALTH CARE EDUCATION/TRAINING PROGRAM

## 2023-12-11 PROCEDURE — 82043 UR ALBUMIN QUANTITATIVE: CPT | Performed by: STUDENT IN AN ORGANIZED HEALTH CARE EDUCATION/TRAINING PROGRAM

## 2023-12-11 PROCEDURE — 80053 COMPREHEN METABOLIC PANEL: CPT | Performed by: STUDENT IN AN ORGANIZED HEALTH CARE EDUCATION/TRAINING PROGRAM

## 2023-12-11 PROCEDURE — 85025 COMPLETE CBC W/AUTO DIFF WBC: CPT | Performed by: STUDENT IN AN ORGANIZED HEALTH CARE EDUCATION/TRAINING PROGRAM

## 2023-12-11 RX ORDER — METOPROLOL SUCCINATE 50 MG/1
TABLET, EXTENDED RELEASE ORAL
Qty: 90 TABLET | Refills: 3 | Status: SHIPPED | OUTPATIENT
Start: 2023-12-11

## 2023-12-11 RX ORDER — VALSARTAN AND HYDROCHLOROTHIAZIDE 160; 25 MG/1; MG/1
1 TABLET ORAL DAILY
Qty: 90 TABLET | Refills: 3 | Status: SHIPPED | OUTPATIENT
Start: 2023-12-11

## 2023-12-12 LAB
BASOPHILS # BLD AUTO: 0.07 10*3/MM3 (ref 0–0.2)
BASOPHILS NFR BLD AUTO: 1 % (ref 0–1.5)
DEPRECATED RDW RBC AUTO: 39.9 FL (ref 37–54)
EOSINOPHIL # BLD AUTO: 0.39 10*3/MM3 (ref 0–0.4)
EOSINOPHIL NFR BLD AUTO: 5.5 % (ref 0.3–6.2)
ERYTHROCYTE [DISTWIDTH] IN BLOOD BY AUTOMATED COUNT: 12 % (ref 12.3–15.4)
HCT VFR BLD AUTO: 36.1 % (ref 37.5–51)
HGB BLD-MCNC: 11.3 G/DL (ref 13–17.7)
IMM GRANULOCYTES # BLD AUTO: 0.02 10*3/MM3 (ref 0–0.05)
IMM GRANULOCYTES NFR BLD AUTO: 0.3 % (ref 0–0.5)
LYMPHOCYTES # BLD AUTO: 1.18 10*3/MM3 (ref 0.7–3.1)
LYMPHOCYTES NFR BLD AUTO: 16.8 % (ref 19.6–45.3)
MCH RBC QN AUTO: 28.3 PG (ref 26.6–33)
MCHC RBC AUTO-ENTMCNC: 31.3 G/DL (ref 31.5–35.7)
MCV RBC AUTO: 90.3 FL (ref 79–97)
MONOCYTES # BLD AUTO: 0.74 10*3/MM3 (ref 0.1–0.9)
MONOCYTES NFR BLD AUTO: 10.5 % (ref 5–12)
NEUTROPHILS NFR BLD AUTO: 4.64 10*3/MM3 (ref 1.7–7)
NEUTROPHILS NFR BLD AUTO: 65.9 % (ref 42.7–76)
NRBC BLD AUTO-RTO: 0 /100 WBC (ref 0–0.2)
PLATELET # BLD AUTO: 310 10*3/MM3 (ref 140–450)
PMV BLD AUTO: 9.3 FL (ref 6–12)
RBC # BLD AUTO: 4 10*6/MM3 (ref 4.14–5.8)
WBC NRBC COR # BLD AUTO: 7.04 10*3/MM3 (ref 3.4–10.8)

## 2023-12-13 ENCOUNTER — OFFICE VISIT (OUTPATIENT)
Dept: INTERNAL MEDICINE | Facility: CLINIC | Age: 73
End: 2023-12-13
Payer: MEDICARE

## 2023-12-13 VITALS
BODY MASS INDEX: 35.56 KG/M2 | SYSTOLIC BLOOD PRESSURE: 126 MMHG | DIASTOLIC BLOOD PRESSURE: 72 MMHG | WEIGHT: 262.2 LBS | OXYGEN SATURATION: 96 % | TEMPERATURE: 97.2 F | HEART RATE: 72 BPM

## 2023-12-13 DIAGNOSIS — N18.31 STAGE 3A CHRONIC KIDNEY DISEASE: ICD-10-CM

## 2023-12-13 DIAGNOSIS — D64.9 ANEMIA, UNSPECIFIED TYPE: ICD-10-CM

## 2023-12-13 DIAGNOSIS — M79.672 PAIN IN BOTH FEET: ICD-10-CM

## 2023-12-13 DIAGNOSIS — E11.65 TYPE 2 DIABETES MELLITUS WITH HYPERGLYCEMIA, WITHOUT LONG-TERM CURRENT USE OF INSULIN: Primary | ICD-10-CM

## 2023-12-13 DIAGNOSIS — M79.671 PAIN IN BOTH FEET: ICD-10-CM

## 2023-12-13 DIAGNOSIS — R42 DIZZINESS: ICD-10-CM

## 2023-12-13 NOTE — PROGRESS NOTES
Chief Complaint  Diabetes (6 mo f/up), Hypertension, Hyperlipidemia, Dizziness, and Foot Pain (Will tingle and hurt sometimes, comes and goes )    Subjective            Toñito Kuo presents to [facility]  History of Present Illness    Diabetes:  Hyperlipidemia:  Chronic, presenting for lab result review.       Hypertension:  Chronic, does have bp cuff at home but not checking at the moment.   Not able to tell when bp is elevated.    Dizziness:  A few  mons ago, had a an episode when he bent over quickly looking at how much fluid he had in a measuring cup and fell. States he fell dizzy.   If he's working and having to look up then he does get light headed.  States he did mention this to his cardiologist, recommended monitoring clinically for now.     Foot pain:   Endorses tingling sensation in his feet bilaterally, ongoing for past couple years.   No tripping, but states he is much more cautious now about his balance now compared to 10 years ago.       Past Medical History:   Diagnosis Date    Bladder disease     CAD (coronary artery disease) 11/09/2016    hx     Cancer     SKIN CANCER BASAL LEFT EYE AND EAR  AND SQAMOUS CELL RIGHT SIDE TORSO    Chest pain     DENIES ANY RECENT CP/SOA. FOLLOWED BY DR MARCELINO AND TRANSITION TO DR WHIPPLE. REPORTS HAS [address] OF LAND AND WORKS ON IT    Coronary atherosclerosis of native coronary vessel 03/29/2015    Diabetes mellitus     type 2. DOES NOT CHECK BS DAILY    GERD (gastroesophageal reflux disease)     Hard of hearing     HEARS BETTER OUT OF LEFT EAR. BILATERAL HEARING AID    Heart attack     HTN (hypertension)     Hyperlipemia     Osteoarthritis     BILATERAL HIPS    Paroxysmal atrial fibrillation 08/11/2021    Seasonal allergies     Sleep apnea     over 10 yrs ago    Stage 3a chronic kidney disease 08/11/2021       Allergies:   Allergies   Allergen Reactions    Penicillins Rash          Past Surgical History:   Procedure  Laterality Date    APPENDECTOMY      1957    COLONOSCOPY      diverticulosis    CORONARY ARTERY BYPASS GRAFT      2/2011    HERNIA REPAIR      SKIN CANCER EXCISION Right     TESTICLE SURGERY  1974    TOTAL HIP ARTHROPLASTY Right 10/03/2022    Procedure: TOTAL HIP ARTHROPLASTY ANTERIOR RIGHT;  Surgeon: Ravinder Hawley MD;  Location: [facility] MAIN OR;  Service: Orthopedics;  Laterality: Right;          Social History     Socioeconomic History    Marital status:    Tobacco Use    Smoking status: Never    Smokeless tobacco: Never   Vaping Use    Vaping Use: Never used   Substance and Sexual Activity    Alcohol use: Yes     Comment: OCC    Drug use: Never    Sexual activity: Defer         Family History   Problem Relation Age of Onset    Melanoma Mother     Breast cancer Sister     Colon cancer Maternal Grandmother     Diabetes Maternal Grandmother     Lung cancer Maternal Grandfather     Breast cancer Paternal Grandmother     Malig Hyperthermia Neg Hx           Health Maintenance Due   Topic Date Due    HEPATITIS C SCREENING  Never done    ZOSTER VACCINE (3 of 3) 08/07/2023            Current Outpatient Medications:     apixaban (ELIQUIS) 5 MG tablet tablet, Take 1 tablet by mouth 2 (Two) Times a Day., Disp: 180 tablet, Rfl: 3    diclofenac (VOLTAREN) 75 MG EC tablet, Take 1 tablet by mouth 2 (Two) Times a Day., Disp: 60 tablet, Rfl: 1    Diclofenac Sodium (VOLTAREN) 1 % gel gel, Apply 4 g topically to the appropriate area as directed 4 (Four) Times a Day As Needed (pain)., Disp: 150 g, Rfl: 3    metFORMIN ER (GLUCOPHAGE-XR) 500 MG 24 hr tablet, TAKE 1 TABLET TWICE DAILY WITH MEALS, Disp: 180 tablet, Rfl: 1    metoprolol succinate XL (TOPROL-XL) 50 MG 24 hr tablet, TAKE 1 TABLET EVERY DAY, Disp: 90 tablet, Rfl: 3    multivitamin with minerals tablet tablet, Take 1 tablet by mouth Daily., Disp: , Rfl:     nitroglycerin (NITROSTAT) 0.4 MG SL tablet, Place 1 tablet under the tongue Every 5 (Five) Minutes As Needed  for Chest Pain., Disp: 30 tablet, Rfl: 3    rosuvastatin (CRESTOR) 40 MG tablet, Take 1 tablet by mouth Daily., Disp: 90 tablet, Rfl: 1    sildenafil (Viagra) 50 MG tablet, Take 1 tablet by mouth Daily As Needed for Erectile Dysfunction., Disp: 8 tablet, Rfl: 3    spironolactone (ALDACTONE) 25 MG tablet, TAKE 1 TABLET EVERY OTHER DAY, Disp: 90 tablet, Rfl: 3    triamcinolone (KENALOG) 0.1 % cream, 2 (Two) Times a Day As Needed., Disp: , Rfl:     valsartan-hydrochlorothiazide (DIOVAN-HCT) 160-25 MG per tablet, TAKE 1 TABLET EVERY DAY, Disp: 90 tablet, Rfl: 3    clindamycin (Cleocin) 300 MG capsule, TAKE 2 CAPSULES 1 HOUR PRIOR TO DENTAL APPOINTMENT (Patient not taking: Reported on 12/13/2023), Disp: 2 capsule, Rfl: 0      Immunization History   Administered Date(s) Administered    COVID-19 (MODERNA) 1st,2nd,3rd Dose Monovalent 01/08/2021, 02/05/2021, 11/10/2021    COVID-19 (MODERNA) BIVALENT 12+YRS 11/09/2022    COVID-19 (MODERNA) Monovalent Original Booster 04/27/2022    COVID-19 F23 (MODERNA) 12YRS+ (SPIKEVAX) 10/30/2023    Fluzone High-Dose 65+yrs 10/13/2022    Fluzone Quad >6mos (Multi-dose) 09/25/2020    Hepatitis A 11/25/2020    Pneumococcal Conjugate 13-Valent (PCV13) 10/20/2015    Pneumococcal Conjugate 20-Valent (PCV20) 06/12/2023    Pneumococcal Polysaccharide (PPSV23) 11/06/2013    Td (TDVAX) 01/05/2006    Tdap 04/12/2017    Zostavax 05/30/2013         Review of Systems       Objective       Vitals:    12/13/23 1012   BP: 126/72   BP Location: Right arm   Patient Position: Sitting   Pulse: 72   Temp: 97.2 °F (36.2 °C)   TempSrc: Temporal   SpO2: 96%   Weight: 119 kg (262 lb 3.2 oz)     Body mass index is 35.56 kg/m².      Physical Exam  Vitals reviewed.   Constitutional:       Appearance: Normal appearance.   HENT:      Head: Normocephalic and atraumatic.      Nose: Nose normal.   Eyes:      Extraocular Movements: Extraocular movements intact.      Conjunctiva/sclera: Conjunctivae normal.   Cardiovascular:       Rate and Rhythm: Normal rate and regular rhythm.      Pulses: Normal pulses.      Heart sounds: Normal heart sounds.   Pulmonary:      Effort: Pulmonary effort is normal. No respiratory distress.      Breath sounds: Normal breath sounds.   Musculoskeletal:         General: Normal range of motion.   Skin:     General: Skin is warm and dry.   Neurological:      General: No focal deficit present.      Mental Status: He is alert and oriented to person, place, and time.      Cranial Nerves: No cranial nerve deficit.   Psychiatric:         Mood and Affect: Mood normal.         Behavior: Behavior normal.         Thought Content: Thought content normal.             Result Review :     The following data was reviewed by: Belinda Bocanegra MD on 12/13/2023:    Common labs          6/12/2023    11:32 12/11/2023    12:02   Common Labs   Glucose 119  124    BUN 28  21    Creatinine 1.38  1.43    Sodium 140  140    Potassium 4.8  4.8    Chloride 106  105    Calcium 9.4  9.6    Albumin 4.7  4.5    Total Bilirubin 0.2  0.3    Alkaline Phosphatase 57  58    AST (SGOT) 23  18    ALT (SGPT) 15  14    WBC 6.05  7.04    Hemoglobin 11.4  11.3    Hematocrit 34.3  36.1    Platelets 298  310    Total Cholesterol 118  130    Triglycerides 73  79    HDL Cholesterol 48  51    LDL Cholesterol  55  63    Hemoglobin A1C 6.40  6.20    Microalbumin, Urine  5.8    PSA 1.690                       Assessment and Plan      Diagnoses and all orders for this visit:    1. Type 2 diabetes mellitus with hyperglycemia, without long-term current use of insulin (Primary)  Comments:  Chronic and well controlled w/ A1c less than 7. Continue current regimen. f/u in 6 mos w/ repeat labs.  Orders:  -     CBC & Differential; Future  -     Comprehensive Metabolic Panel; Future  -     Hemoglobin A1c; Future  -     Lipid Panel; Future  -     TSH; Future  -     T4, Free; Future  -     Ambulatory Referral to Podiatry    2. Stage 3a chronic kidney  disease  Comments:  chronic and stable. HTN and diabetes are in good control.    3. Anemia, unspecified type  Comments:  Chronic, mild. Likely due to chronic disease, including ckd. external hemorrhoids noted on last colonoscopy in 2019.  Monitoring clinically.    4. Dizziness  Comments:  Chronic, intermittent and transient. No red flags on hx or exam in office today. Discussed if symptoms worsened, or are consistently reproducible w/ specific    5. Pain in both feet  Comments:  chronic, intermittent. Concerning for possible neuropathy in pt w/ chronic diabetes. Referring to podiatry.  Orders:  -     Ambulatory Referral to Podiatry      Hyperlipidemia: Controlled w/ rosuvastatin.             Follow Up     Return in about 5 months (around 5/6/2024) for diabetes .    Patient was given instructions and counseling regarding his condition or for health maintenance advice. Please see specific information pulled into the AVS if appropriate.     Belinda Bocanegra MD   Internal Medicine-Pediatrics

## 2024-02-27 DIAGNOSIS — N18.31 STAGE 3A CHRONIC KIDNEY DISEASE: ICD-10-CM

## 2024-02-27 RX ORDER — SPIRONOLACTONE 25 MG/1
TABLET ORAL
Qty: 45 TABLET | Refills: 3 | Status: SHIPPED | OUTPATIENT
Start: 2024-02-27

## 2024-03-26 RX ORDER — ROSUVASTATIN CALCIUM 40 MG/1
40 TABLET, COATED ORAL DAILY
Qty: 90 TABLET | Refills: 3 | Status: SHIPPED | OUTPATIENT
Start: 2024-03-26

## 2024-04-02 ENCOUNTER — OFFICE VISIT (OUTPATIENT)
Dept: PODIATRY | Facility: CLINIC | Age: 74
End: 2024-04-02
Payer: MEDICARE

## 2024-04-02 VITALS
WEIGHT: 261 LBS | OXYGEN SATURATION: 94 % | HEIGHT: 72 IN | TEMPERATURE: 98 F | DIASTOLIC BLOOD PRESSURE: 76 MMHG | HEART RATE: 77 BPM | BODY MASS INDEX: 35.35 KG/M2 | SYSTOLIC BLOOD PRESSURE: 130 MMHG

## 2024-04-02 DIAGNOSIS — G62.9 NEUROPATHY: ICD-10-CM

## 2024-04-02 DIAGNOSIS — E11.42 TYPE 2 DIABETES MELLITUS WITH POLYNEUROPATHY: Primary | ICD-10-CM

## 2024-04-02 PROCEDURE — G8404 LOW EXTEMITY NEUR EXAM DOCUM: HCPCS | Performed by: PODIATRIST

## 2024-04-02 PROCEDURE — 3075F SYST BP GE 130 - 139MM HG: CPT | Performed by: PODIATRIST

## 2024-04-02 PROCEDURE — 1160F RVW MEDS BY RX/DR IN RCRD: CPT | Performed by: PODIATRIST

## 2024-04-02 PROCEDURE — 99203 OFFICE O/P NEW LOW 30 MIN: CPT | Performed by: PODIATRIST

## 2024-04-02 PROCEDURE — 1159F MED LIST DOCD IN RCRD: CPT | Performed by: PODIATRIST

## 2024-04-02 PROCEDURE — 3078F DIAST BP <80 MM HG: CPT | Performed by: PODIATRIST

## 2024-04-02 NOTE — PROGRESS NOTES
[facility] - PODIATRY    Today's Date: 04/02/24    Patient Name: Toñito Kuo  MRN: [id number]  CSN: [id number]  PCP: Belinda Bocanegra MD, Last PCP Visit:  12/13/2023  Referring Provider: Belinda Bocanegra MD    SUBJECTIVE     Chief Complaint   Patient presents with    Left Foot - Establish Care, Diabetes    Right Foot - Diabetes     HPI: Toñito Kuo, a 73 y.o.male, presents to clinic for a diabetic foot evaluation.    New, Established, New Problem:  new    Onset: Insidious    Nature:  NIDDM    Patient controlling diabetes via:  oral meds    Patient denies any fevers, chills, nausea, vomiting, shortness of breath, nor any other constitutional signs nor symptoms.    Numbness in feet.    Past Medical History:   Diagnosis Date    Bladder disease     CAD (coronary artery disease) 11/09/2016    hx     Cancer     SKIN CANCER BASAL LEFT EYE AND EAR  AND SQAMOUS CELL RIGHT SIDE TORSO    Chest pain     DENIES ANY RECENT CP/SOA. FOLLOWED BY DR MARCELINO AND TRANSITION TO DR WHIPPLE. REPORTS HAS 35 ACRES OF LAND AND WORKS ON IT    Coronary atherosclerosis of native coronary vessel 03/29/2015    Diabetes mellitus     type 2. DOES NOT CHECK BS DAILY    GERD (gastroesophageal reflux disease)     Hard of hearing     HEARS BETTER OUT OF LEFT EAR. BILATERAL HEARING AID    Heart attack     HTN (hypertension)     Hyperlipemia     Osteoarthritis     BILATERAL HIPS    Paroxysmal atrial fibrillation 08/11/2021    Seasonal allergies     Sleep apnea     over 10 yrs ago    Stage 3a chronic kidney disease 08/11/2021     Past Surgical History:   Procedure Laterality Date    APPENDECTOMY      1957    COLONOSCOPY      diverticulosis    CORONARY ARTERY BYPASS GRAFT      2/2011    HERNIA REPAIR      SKIN CANCER EXCISION Right     TESTICLE SURGERY  1974    TOTAL HIP ARTHROPLASTY Right 10/03/2022    Procedure: TOTAL HIP ARTHROPLASTY ANTERIOR RIGHT;  Surgeon: Ravinder Hawley MD;  Location: [facility] MAIN OR;  Service: Orthopedics;   Laterality: Right;     Family History   Problem Relation Age of Onset    Melanoma Mother     Breast cancer Sister     Colon cancer Maternal Grandmother     Diabetes Maternal Grandmother     Lung cancer Maternal Grandfather     Breast cancer Paternal Grandmother     Malig Hyperthermia Neg Hx      Social History     Socioeconomic History    Marital status:    Tobacco Use    Smoking status: Never    Smokeless tobacco: Never   Vaping Use    Vaping status: Never Used   Substance and Sexual Activity    Alcohol use: Yes     Comment: OCC    Drug use: Never    Sexual activity: Defer     Allergies   Allergen Reactions    Penicillins Rash     Current Outpatient Medications   Medication Sig Dispense Refill    apixaban (ELIQUIS) 5 MG tablet tablet Take 1 tablet by mouth 2 (Two) Times a Day. 180 tablet 3    diclofenac (VOLTAREN) 75 MG EC tablet Take 1 tablet by mouth 2 (Two) Times a Day. 60 tablet 1    Diclofenac Sodium (VOLTAREN) 1 % gel gel Apply 4 g topically to the appropriate area as directed 4 (Four) Times a Day As Needed (pain). 150 g 3    metFORMIN ER (GLUCOPHAGE-XR) 500 MG 24 hr tablet TAKE 1 TABLET TWICE DAILY WITH MEALS 180 tablet 1    metoprolol succinate XL (TOPROL-XL) 50 MG 24 hr tablet TAKE 1 TABLET EVERY DAY 90 tablet 3    multivitamin with minerals tablet tablet Take 1 tablet by mouth Daily.      nitroglycerin (NITROSTAT) 0.4 MG SL tablet Place 1 tablet under the tongue Every 5 (Five) Minutes As Needed for Chest Pain. 30 tablet 3    rosuvastatin (CRESTOR) 40 MG tablet TAKE 1 TABLET EVERY DAY 90 tablet 3    sildenafil (Viagra) 50 MG tablet Take 1 tablet by mouth Daily As Needed for Erectile Dysfunction. 8 tablet 3    spironolactone (ALDACTONE) 25 MG tablet TAKE 1 TABLET EVERY OTHER DAY 45 tablet 3    triamcinolone (KENALOG) 0.1 % cream 2 (Two) Times a Day As Needed.      valsartan-hydrochlorothiazide (DIOVAN-HCT) 160-25 MG per tablet TAKE 1 TABLET EVERY DAY 90 tablet 3     No current facility-administered  medications for this visit.     Review of Systems   Constitutional: Negative.    Neurological:  Positive for numbness.   All other systems reviewed and are negative.      OBJECTIVE     Vitals:    24 1247   BP: 130/76   Pulse: 77   Temp: 98 °F (36.7 °C)   SpO2: 94%       Body mass index is 35.4 kg/m².    Lab Results   Component Value Date    HGBA1C 6.20 (H) 2023       Lab Results   Component Value Date    GLUCOSE 124 (H) 2023    CALCIUM 9.6 2023     2023    K 4.8 2023    CO2 23.6 2023     2023    BUN 21 2023    CREATININE 1.43 (H) 2023    EGFRIFNONA 56 (L) 2021    BCR 14.7 2023    ANIONGAP 11.4 2023       Patient seen in no apparent distress.      PHYSICAL EXAM:     Foot/Ankle Exam    GENERAL  Diabetic foot exam performed    Appearance:  appears stated age and elderly  Orientation:  AAOx3  Affect:  appropriate  Gait:  unimpaired  Assistance:  independent  Right shoe gear: casual shoe  Left shoe gear: casual shoe    VASCULAR     Right Foot Vascularity   Dorsalis pedis:  2+  Posterior tibial:  2+  Skin temperature:  warm  Edema gradin+ and pitting  CFT:  < 3 seconds  Pedal hair growth:  Present  Varicosities:  moderate varicosities     Left Foot Vascularity   Dorsalis pedis:  2+  Posterior tibial:  2+  Skin temperature:  warm  Edema gradin+ and pitting  CFT:  < 3 seconds  Pedal hair growth:  Present  Varicosities:  moderate varicosities     NEUROLOGIC     Right Foot Neurologic   Light touch sensation: diminished  Vibratory sensation: diminished  Hot/Cold sensation: diminished  Protective Sensation using [place] Monofilament:   Sites intact: 5  Sites tested: 10     Left Foot Neurologic   Light touch sensation: diminished  Vibratory sensation: diminished  Hot/Cold sensation:  diminished  Protective Sensation using [place] Monofilament:   Sites intact: 5  Sites tested: 10    MUSCLE STRENGTH     Right Foot  Muscle Strength   Foot dorsiflexion:  4  Foot plantar flexion:  4  Foot inversion:  4  Foot eversion:  4     Left Foot Muscle Strength   Foot dorsiflexion:  4  Foot plantar flexion:  4  Foot inversion:  4  Foot eversion:  4    RANGE OF MOTION     Right Foot Range of Motion   Foot and ankle ROM within normal limits       Left Foot Range of Motion   Foot and ankle ROM within normal limits      DERMATOLOGIC      Right Foot Dermatologic   Skin  Right foot skin is intact.      Left Foot Dermatologic   Skin  Left foot skin is intact.     Diabetic Foot Exam Performed and Monofilament Test Performed    ASSESSMENT/PLAN     Diagnoses and all orders for this visit:    1. Type 2 diabetes mellitus with polyneuropathy (Primary)    2. Neuropathy        Comprehensive lower extremity examination and evaluation was performed.    Discussed findings and treatment plan including risks, benefits, and treatment options with patient in detail. Patient agreed with treatment plan.    Medications and allergies reviewed.  Reviewed available blood glucose and HgB A1C lab values along with other pertinent labs.  These were discussed with the patient as to their importance of diabetic maintenance.    Diabetic foot exam performed and documented this date, compliant with CQM required standards. Detail of findings as noted in physical exam.  Lower extremity Neurologic exam for diabetic patient performed and documented this date, compliant with PQRS required standards. Detail of findings as noted in physical exam.  Advised patient importance of good routine lower extremity hygiene. Advised patient importance of evaluating for intact skin and pain free nail borders.  Advised patient to use mirror to evaluate plantar/ soles of feet for better visualization. Advised patient monitor and phone office to be seen if any cracking to skin, open lesions, painful nail borders or if nails become elongated prior to next visit. Advised patient importance of daily  cleansing of lower extremities, followed by good skin cream to maintain normal hydration of skin. Also advised patient importance of close daily monitoring of blood sugar. Advised to regulate diet and medications to maintain control of blood sugar in optimal range. Contact primary care provider if difficulties maintaining blood sugar levels.  Advised Patient of presence of Diabetes Mellitus condition.  Advised Patient risk of progression and worsening or improvement, then return of condition.  Will monitor condition for any change in future. Treat with most appropriate treatment pending status of condition.  Counseled and advised patient extensively on nature and ramifications of diabetes. Standard instructions given to patient for good diabetic foot care and maintenance. Advised importance of careful monitoring to avoid break down and complications secondary to diabetes. Advised patient importance of strict maintenance of blood sugar control. Advised patient of possible ominous results from neglect of condition, i.e.: amputation/ loss of digits, feet and legs, or even death.  Patient states understands counseling, will monitor closely, continue good hygiene and routine diabetic foot care. Patient will contact office is questions or problems.      An After Visit Summary was printed and given to the patient at discharge, including (if requested) any available informative/educational handouts regarding diagnosis, treatment, or medications. All questions were answered to patient/family satisfaction. Should symptoms fail to improve or worsen they agree to call or return to clinic or to go to the Emergency Department. Discussed the importance of following up with any needed screening tests/labs/specialist appointments and any requested follow-up recommended by me today. Importance of maintaining follow-up discussed and patient accepts that missed appointments can delay diagnosis and potentially lead to worsening of  conditions.    Return in about 1 year (around 4/2/2025) for DFE., or sooner if acute issues arise.    This document has been electronically signed by Olvin Arthur DPM on April 2, 2024 13:16 EDT

## 2024-04-15 ENCOUNTER — TELEPHONE (OUTPATIENT)
Dept: SURGERY | Facility: CLINIC | Age: 74
End: 2024-04-15

## 2024-04-15 ENCOUNTER — PREP FOR SURGERY (OUTPATIENT)
Dept: OTHER | Facility: HOSPITAL | Age: 74
End: 2024-04-15
Payer: MEDICARE

## 2024-04-15 ENCOUNTER — OFFICE VISIT (OUTPATIENT)
Dept: SURGERY | Facility: CLINIC | Age: 74
End: 2024-04-15
Payer: MEDICARE

## 2024-04-15 VITALS
DIASTOLIC BLOOD PRESSURE: 69 MMHG | HEART RATE: 57 BPM | SYSTOLIC BLOOD PRESSURE: 134 MMHG | BODY MASS INDEX: 35.08 KG/M2 | HEIGHT: 72 IN | WEIGHT: 259 LBS

## 2024-04-15 DIAGNOSIS — Z12.11 SCREENING FOR MALIGNANT NEOPLASM OF COLON: Primary | ICD-10-CM

## 2024-04-15 DIAGNOSIS — Z80.0 FAMILY HISTORY OF COLON CANCER: ICD-10-CM

## 2024-04-15 PROCEDURE — 3078F DIAST BP <80 MM HG: CPT | Performed by: NURSE PRACTITIONER

## 2024-04-15 PROCEDURE — 1159F MED LIST DOCD IN RCRD: CPT | Performed by: NURSE PRACTITIONER

## 2024-04-15 PROCEDURE — 1160F RVW MEDS BY RX/DR IN RCRD: CPT | Performed by: NURSE PRACTITIONER

## 2024-04-15 PROCEDURE — S0260 H&P FOR SURGERY: HCPCS | Performed by: NURSE PRACTITIONER

## 2024-04-15 PROCEDURE — 3075F SYST BP GE 130 - 139MM HG: CPT | Performed by: NURSE PRACTITIONER

## 2024-04-15 RX ORDER — POLYETHYLENE GLYCOL 3350 17 G/17G
POWDER, FOR SOLUTION ORAL
Qty: 238 PACKET | Refills: 0 | Status: SHIPPED | OUTPATIENT
Start: 2024-04-15

## 2024-04-15 RX ORDER — AMLODIPINE BESYLATE 2.5 MG/1
2.5 TABLET ORAL DAILY
COMMUNITY

## 2024-04-15 RX ORDER — METFORMIN HYDROCHLORIDE 500 MG/1
TABLET, EXTENDED RELEASE ORAL
Qty: 180 TABLET | Refills: 3 | Status: SHIPPED | OUTPATIENT
Start: 2024-04-15

## 2024-04-15 RX ORDER — SODIUM CHLORIDE 9 MG/ML
40 INJECTION, SOLUTION INTRAVENOUS AS NEEDED
OUTPATIENT
Start: 2024-04-15

## 2024-04-15 RX ORDER — SODIUM CHLORIDE 0.9 % (FLUSH) 0.9 %
3 SYRINGE (ML) INJECTION EVERY 12 HOURS SCHEDULED
OUTPATIENT
Start: 2024-04-15

## 2024-04-15 RX ORDER — SODIUM CHLORIDE 0.9 % (FLUSH) 0.9 %
10 SYRINGE (ML) INJECTION AS NEEDED
OUTPATIENT
Start: 2024-04-15

## 2024-04-15 NOTE — TELEPHONE ENCOUNTER
Patient: Toñito Kuo  YOB: 1950      This patient is waiting to have a Colonoscopy which will be performed at [facility] on 5/20/24 by .     Our records indicate this patient is currently taking eliquis . This procedure requires the patient to suspend their Eliquis 2 days prior to surgery.     Please respond to this request noting your recommendations. You may contact our office at [phone] with any questions. I appreciate your prompt response in this matter.     Please return this form to our office as soon as possible to [phone]    ____ I approve my patient to stop taking their Eliquis 2 days prior to the scheduled procedure.    ____ I do NOT approve my patient to stop taking their Eliquis at this time.    ____ I approve my patient from a cardiac standpoint.    ____ I do NOT approve my patient from a cardiac standpoint at this time.    Approving physician name (please print):     _____________________________________________    Approving physician signature:     ________________________________   Date:________________      Sincerely,    ANDRES Maxwell

## 2024-04-15 NOTE — TELEPHONE ENCOUNTER
Procedure: Colonoscopy and/or EGD     Med Directive: Eliquis     PMH: CAD, CABG, PAF, HLH, HTN     Last Seen: 12/6/23

## 2024-04-15 NOTE — PROGRESS NOTES
Chief Complaint: Colonoscopy (consult)    Subjective     Colonoscopy consultation       History of Present Illness  Toñito Kuo is a 73 y.o. male presents to [facility] for colonoscopy consultation.    Patient presents today without complaints for screening colonoscopy.  He denies any abdominal pain, change in bowel habit, or rectal bleeding.  Admits to family history of colon cancer with his maternal grandmother.      Patient does have A-fib and takes Eliquis daily.  He is under the care of Dr. Dang.  I will get cardiac clearance prior to the procedure.    Patient is with RAUL.  He does use a CPAP.    Denies taking a GLP-1 receptors.    4/19: colonoscopy ([place]): external hemorrhoids; diverticulosis.     Objective     Past Medical History:   Diagnosis Date    Bladder disease     CAD (coronary artery disease) 11/09/2016    hx     Cancer     SKIN CANCER BASAL LEFT EYE AND EAR  AND SQAMOUS CELL RIGHT SIDE TORSO    Chest pain     DENIES ANY RECENT CP/SOA. FOLLOWED BY DR MARCELINO AND TRANSITION TO DR DANG. REPORTS HAS 35 [address]CRES OF LAND AND WORKS ON IT    Coronary atherosclerosis of native coronary vessel 03/29/2015    Diabetes mellitus     type 2. DOES NOT CHECK BS DAILY    GERD (gastroesophageal reflux disease)     Hard of hearing     HEARS BETTER OUT OF LEFT EAR. BILATERAL HEARING AID    Heart attack     HTN (hypertension)     Hyperlipemia     Osteoarthritis     BILATERAL HIPS    Paroxysmal atrial fibrillation 08/11/2021    Seasonal allergies     Sleep apnea     over 10 yrs ago    Stage 3a chronic kidney disease 08/11/2021       Past Surgical History:   Procedure Laterality Date    APPENDECTOMY      1957    COLONOSCOPY      diverticulosis    CORONARY ARTERY BYPASS GRAFT      2/2011    HERNIA REPAIR      SKIN CANCER EXCISION Right     TESTICLE SURGERY  1974    TOTAL HIP ARTHROPLASTY Right 10/03/2022    Procedure: TOTAL HIP ARTHROPLASTY ANTERIOR RIGHT;  Surgeon: Ravinder Hawley  MD;  Location: [facility] MAIN OR;  Service: Orthopedics;  Laterality: Right;       Outpatient Medications Marked as Taking for the 4/15/24 encounter (Office Visit) with Sernia Meraz APRN   Medication Sig Dispense Refill    amLODIPine (NORVASC) 2.5 MG tablet Take 1 tablet by mouth Daily.      apixaban (ELIQUIS) 5 MG tablet tablet Take 1 tablet by mouth 2 (Two) Times a Day. 180 tablet 3    metFORMIN ER (GLUCOPHAGE-XR) 500 MG 24 hr tablet TAKE 1 TABLET TWICE DAILY WITH MEALS 180 tablet 1    metoprolol succinate XL (TOPROL-XL) 50 MG 24 hr tablet TAKE 1 TABLET EVERY DAY 90 tablet 3    multivitamin with minerals tablet tablet Take 1 tablet by mouth Daily.      nitroglycerin (NITROSTAT) 0.4 MG SL tablet Place 1 tablet under the tongue Every 5 (Five) Minutes As Needed for Chest Pain. 30 tablet 3    rosuvastatin (CRESTOR) 40 MG tablet TAKE 1 TABLET EVERY DAY 90 tablet 3    sildenafil (Viagra) 50 MG tablet Take 1 tablet by mouth Daily As Needed for Erectile Dysfunction. 8 tablet 3    spironolactone (ALDACTONE) 25 MG tablet TAKE 1 TABLET EVERY OTHER DAY 45 tablet 3    triamcinolone (KENALOG) 0.1 % cream 2 (Two) Times a Day As Needed.      valsartan-hydrochlorothiazide (DIOVAN-HCT) 160-25 MG per tablet TAKE 1 TABLET EVERY DAY 90 tablet 3       Allergies   Allergen Reactions    Penicillins Rash        Family History   Problem Relation Age of Onset    Melanoma Mother     Breast cancer Sister     Colon cancer Maternal Grandmother     Diabetes Maternal Grandmother     Lung cancer Maternal Grandfather     Breast cancer Paternal Grandmother     Malig Hyperthermia Neg Hx        Social History     Socioeconomic History    Marital status:    Tobacco Use    Smoking status: Never    Smokeless tobacco: Never   Vaping Use    Vaping status: Never Used   Substance and Sexual Activity    Alcohol use: Yes     Comment: OCC    Drug use: Never    Sexual activity: Defer       Review of Systems   Constitutional:  Negative for chills and fever.  "  Gastrointestinal:  Negative for abdominal distention, abdominal pain, anal bleeding, blood in stool, constipation, diarrhea and rectal pain.        Vital Signs:   /69 (BP Location: Right arm)   Pulse 57   Ht 182.9 cm (72\")   Wt 117 kg (259 lb)   BMI 35.13 kg/m²      Physical Exam  Vitals and nursing note reviewed.   Constitutional:       General: He is not in acute distress.     Appearance: Normal appearance.   HENT:      Head: Normocephalic.   Cardiovascular:      Rate and Rhythm: Normal rate.   Pulmonary:      Effort: Pulmonary effort is normal.      Breath sounds: No stridor.   Abdominal:      Palpations: Abdomen is soft.      Tenderness: There is no guarding.   Musculoskeletal:         General: No deformity. Normal range of motion.   Skin:     General: Skin is warm and dry.      Coloration: Skin is not jaundiced.   Neurological:      General: No focal deficit present.      Mental Status: He is alert and oriented to person, place, and time.   Psychiatric:         Mood and Affect: Mood normal.         Thought Content: Thought content normal.          Result Review :              []  Laboratory  []  Radiology  []  Pathology  []  Microbiology  []  EKG/Telemetry   []  Cardiology/Vascular   []  Old records  I spent 15 minutes caring for Toñito on this date of service. This time includes time spent by me in the following activities: reviewing tests, obtaining and/or reviewing a separately obtained history, performing a medically appropriate examination and/or evaluation, ordering medications, tests, or procedures, and documenting information in the medical record.     Assessment and Plan    Diagnoses and all orders for this visit:    1. Screening for malignant neoplasm of colon (Primary)    2. Family history of colon cancer    Other orders  -     polyethylene glycol (MIRALAX) 17 g packet; Take as directed.  Instructions given in office.  Dispense: 238 g bottle  Dispense: 238 packet; Refill: 0        Follow Up "   Return for Schedule colonoscopy with Dr. Burris 5/20/2024 [facility].    Hospital arrival time: 0930    Possible risks/complications, benefits, and alternatives to surgical or invasive procedures have been explained to patient and/or legal guardian.    Patient has been evaluated and can tolerate anesthesia and/or sedation. Risks, benefits, and alternatives to anesthesia and sedation have been explained to the patient and/or legal guardian. Patient verbalizes understanding and is willing to proceed with the above plan.     Patient was given instructions and counseling regarding his condition or for health maintenance advice. Please see specific information pulled into the AVS if appropriate.

## 2024-04-16 NOTE — TELEPHONE ENCOUNTER
Moderate, stable cardiovascular risk for endoscopy. No additional testing necessary. Can hold eliquis 3 days prior.

## 2024-05-14 ENCOUNTER — LAB (OUTPATIENT)
Dept: LAB | Facility: HOSPITAL | Age: 74
End: 2024-05-14
Payer: MEDICARE

## 2024-05-14 DIAGNOSIS — E11.65 TYPE 2 DIABETES MELLITUS WITH HYPERGLYCEMIA, WITHOUT LONG-TERM CURRENT USE OF INSULIN: ICD-10-CM

## 2024-05-14 LAB
ALBUMIN SERPL-MCNC: 4.4 G/DL (ref 3.5–5.2)
ALBUMIN/GLOB SERPL: 1.5 G/DL
ALP SERPL-CCNC: 63 U/L (ref 39–117)
ALT SERPL W P-5'-P-CCNC: 15 U/L (ref 1–41)
ANION GAP SERPL CALCULATED.3IONS-SCNC: 9.6 MMOL/L (ref 5–15)
AST SERPL-CCNC: 22 U/L (ref 1–40)
BASOPHILS # BLD AUTO: 0.07 10*3/MM3 (ref 0–0.2)
BASOPHILS NFR BLD AUTO: 0.9 % (ref 0–1.5)
BILIRUB SERPL-MCNC: 0.2 MG/DL (ref 0–1.2)
BUN SERPL-MCNC: 33 MG/DL (ref 8–23)
BUN/CREAT SERPL: 23.4 (ref 7–25)
CALCIUM SPEC-SCNC: 9.9 MG/DL (ref 8.6–10.5)
CHLORIDE SERPL-SCNC: 103 MMOL/L (ref 98–107)
CHOLEST SERPL-MCNC: 120 MG/DL (ref 0–200)
CO2 SERPL-SCNC: 24.4 MMOL/L (ref 22–29)
CREAT SERPL-MCNC: 1.41 MG/DL (ref 0.76–1.27)
DEPRECATED RDW RBC AUTO: 43.6 FL (ref 37–54)
EGFRCR SERPLBLD CKD-EPI 2021: 52 ML/MIN/1.73
EOSINOPHIL # BLD AUTO: 0.41 10*3/MM3 (ref 0–0.4)
EOSINOPHIL NFR BLD AUTO: 5.3 % (ref 0.3–6.2)
ERYTHROCYTE [DISTWIDTH] IN BLOOD BY AUTOMATED COUNT: 13 % (ref 12.3–15.4)
GLOBULIN UR ELPH-MCNC: 2.9 GM/DL
GLUCOSE SERPL-MCNC: 115 MG/DL (ref 65–99)
HBA1C MFR BLD: 6.1 % (ref 4.8–5.6)
HCT VFR BLD AUTO: 35.3 % (ref 37.5–51)
HDLC SERPL-MCNC: 49 MG/DL (ref 40–60)
HGB BLD-MCNC: 11.6 G/DL (ref 13–17.7)
IMM GRANULOCYTES # BLD AUTO: 0.03 10*3/MM3 (ref 0–0.05)
IMM GRANULOCYTES NFR BLD AUTO: 0.4 % (ref 0–0.5)
LDLC SERPL CALC-MCNC: 57 MG/DL (ref 0–100)
LDLC/HDLC SERPL: 1.18 {RATIO}
LYMPHOCYTES # BLD AUTO: 1.18 10*3/MM3 (ref 0.7–3.1)
LYMPHOCYTES NFR BLD AUTO: 15.3 % (ref 19.6–45.3)
MCH RBC QN AUTO: 30.4 PG (ref 26.6–33)
MCHC RBC AUTO-ENTMCNC: 32.9 G/DL (ref 31.5–35.7)
MCV RBC AUTO: 92.4 FL (ref 79–97)
MONOCYTES # BLD AUTO: 0.8 10*3/MM3 (ref 0.1–0.9)
MONOCYTES NFR BLD AUTO: 10.4 % (ref 5–12)
NEUTROPHILS NFR BLD AUTO: 5.2 10*3/MM3 (ref 1.7–7)
NEUTROPHILS NFR BLD AUTO: 67.7 % (ref 42.7–76)
NRBC BLD AUTO-RTO: 0 /100 WBC (ref 0–0.2)
PLATELET # BLD AUTO: 295 10*3/MM3 (ref 140–450)
PMV BLD AUTO: 9.2 FL (ref 6–12)
POTASSIUM SERPL-SCNC: 4.5 MMOL/L (ref 3.5–5.2)
PROT SERPL-MCNC: 7.3 G/DL (ref 6–8.5)
RBC # BLD AUTO: 3.82 10*6/MM3 (ref 4.14–5.8)
SODIUM SERPL-SCNC: 137 MMOL/L (ref 136–145)
T4 FREE SERPL-MCNC: 1.07 NG/DL (ref 0.93–1.7)
TRIGL SERPL-MCNC: 67 MG/DL (ref 0–150)
TSH SERPL DL<=0.05 MIU/L-ACNC: 2.32 UIU/ML (ref 0.27–4.2)
VLDLC SERPL-MCNC: 14 MG/DL (ref 5–40)
WBC NRBC COR # BLD AUTO: 7.69 10*3/MM3 (ref 3.4–10.8)

## 2024-05-14 PROCEDURE — 80053 COMPREHEN METABOLIC PANEL: CPT

## 2024-05-14 PROCEDURE — 84439 ASSAY OF FREE THYROXINE: CPT

## 2024-05-14 PROCEDURE — 80061 LIPID PANEL: CPT

## 2024-05-14 PROCEDURE — 83036 HEMOGLOBIN GLYCOSYLATED A1C: CPT

## 2024-05-14 PROCEDURE — 84443 ASSAY THYROID STIM HORMONE: CPT

## 2024-05-14 PROCEDURE — 85025 COMPLETE CBC W/AUTO DIFF WBC: CPT

## 2024-05-15 NOTE — PRE-PROCEDURE INSTRUCTIONS
"Instructed on date and arrival time of 0930. Come to entrance \"C\". Must have  over age 18 to drive home.  May have two visitors; however, children under 12 must stay in waiting room.  Discussed clear liquid diet (no red or purple), bowel prep, and NPO.  May take medications as usual except for blood thinners, diabetic medications, and weight loss medications.  Verbalized understanding of instructions given.  Instructed to call for questions or concerns.  Hold Eliquis for 3 days prior to procedure.  Clearance noted in chart.  "

## 2024-05-17 ENCOUNTER — ANESTHESIA EVENT (OUTPATIENT)
Dept: GASTROENTEROLOGY | Facility: HOSPITAL | Age: 74
End: 2024-05-17
Payer: MEDICARE

## 2024-05-17 NOTE — ANESTHESIA PREPROCEDURE EVALUATION
Anesthesia Evaluation     NPO Solid Status: > 8 hours  NPO Liquid Status: > 8 hours           Airway   Mallampati: III  TM distance: <3 FB  Neck ROM: full  Possible difficult intubation  Dental - normal exam     Pulmonary - normal exam    breath sounds clear to auscultation  (+) ,sleep apnea on CPAP  Cardiovascular - normal exam    ECG reviewed  PT is on anticoagulation therapy  Patient on routine beta blocker  Rhythm: regular    (+) hypertension 2 medications or greater, past MI  >12 months, CAD, CABG (2011) >6 Months, dysrhythmias (SR WITH PAC'S) Paroxysmal Atrial Fib, Atrial Fib, hyperlipidemia    ROS comment: Surgical closure of PFO    Neuro/Psych  (+) dizziness/light headedness  GI/Hepatic/Renal/Endo    (+) GERD, renal disease (Stage 3a CKD)-, diabetes mellitus type 2 well controlled    Musculoskeletal     Abdominal    Substance History   (+) alcohol use (OCCASSSIONAL BOURBON AT NIGHT)     OB/GYN          Other   arthritis,   history of cancer (skin cancer)      Other Comment: Hard of hearing - bilateral hearing aids  ROS/Med Hx Other: Last dose of Eliquis -     EKG 12/16/2021:  HR 53 bpm  Sinus rhythm  Atrial premature complex    Cardiac clearance given by Dr. Dang on 4/16/2024.                Anesthesia Plan    ASA 3     general   total IV anesthesia  (Total IV Anesthesia    Patient understands anesthesia not responsible for dental damage.  )  inhalational induction     Anesthetic plan, risks, benefits, and alternatives have been provided, discussed and informed consent has been obtained with: mother and father.  Pre-procedure education provided  Plan discussed with CRNA.      CODE STATUS:

## 2024-05-20 ENCOUNTER — ANESTHESIA (OUTPATIENT)
Dept: GASTROENTEROLOGY | Facility: HOSPITAL | Age: 74
End: 2024-05-20
Payer: MEDICARE

## 2024-05-20 ENCOUNTER — HOSPITAL ENCOUNTER (OUTPATIENT)
Facility: HOSPITAL | Age: 74
Setting detail: HOSPITAL OUTPATIENT SURGERY
Discharge: HOME | End: 2024-05-20
Attending: SURGERY | Admitting: SURGERY
Payer: MEDICARE

## 2024-05-20 VITALS
HEART RATE: 54 BPM | HEIGHT: 72 IN | RESPIRATION RATE: 20 BRPM | DIASTOLIC BLOOD PRESSURE: 63 MMHG | WEIGHT: 254.85 LBS | SYSTOLIC BLOOD PRESSURE: 136 MMHG | BODY MASS INDEX: 34.52 KG/M2 | OXYGEN SATURATION: 96 % | TEMPERATURE: 97.8 F

## 2024-05-20 DIAGNOSIS — Z12.11 SCREENING FOR MALIGNANT NEOPLASM OF COLON: ICD-10-CM

## 2024-05-20 DIAGNOSIS — Z80.0 FAMILY HISTORY OF COLON CANCER: ICD-10-CM

## 2024-05-20 LAB — GLUCOSE BLDC GLUCOMTR-MCNC: 140 MG/DL (ref 70–99)

## 2024-05-20 PROCEDURE — 25010000002 PROPOFOL 10 MG/ML EMULSION: Performed by: NURSE ANESTHETIST, CERTIFIED REGISTERED

## 2024-05-20 PROCEDURE — 82948 REAGENT STRIP/BLOOD GLUCOSE: CPT | Performed by: NURSE ANESTHETIST, CERTIFIED REGISTERED

## 2024-05-20 PROCEDURE — 25810000003 LACTATED RINGERS PER 1000 ML: Performed by: NURSE ANESTHETIST, CERTIFIED REGISTERED

## 2024-05-20 RX ORDER — SODIUM CHLORIDE 0.9 % (FLUSH) 0.9 %
3 SYRINGE (ML) INJECTION EVERY 12 HOURS SCHEDULED
Status: DISCONTINUED | OUTPATIENT
Start: 2024-05-20 | End: 2024-05-20 | Stop reason: HOSPADM

## 2024-05-20 RX ORDER — LIDOCAINE HYDROCHLORIDE 20 MG/ML
INJECTION, SOLUTION EPIDURAL; INFILTRATION; INTRACAUDAL; PERINEURAL AS NEEDED
Status: DISCONTINUED | OUTPATIENT
Start: 2024-05-20 | End: 2024-05-20 | Stop reason: SURG

## 2024-05-20 RX ORDER — ONDANSETRON 4 MG/1
4 TABLET, ORALLY DISINTEGRATING ORAL ONCE AS NEEDED
Status: DISCONTINUED | OUTPATIENT
Start: 2024-05-20 | End: 2024-05-20 | Stop reason: HOSPADM

## 2024-05-20 RX ORDER — SODIUM CHLORIDE 0.9 % (FLUSH) 0.9 %
10 SYRINGE (ML) INJECTION AS NEEDED
Status: DISCONTINUED | OUTPATIENT
Start: 2024-05-20 | End: 2024-05-20 | Stop reason: HOSPADM

## 2024-05-20 RX ORDER — PROPOFOL 10 MG/ML
VIAL (ML) INTRAVENOUS AS NEEDED
Status: DISCONTINUED | OUTPATIENT
Start: 2024-05-20 | End: 2024-05-20 | Stop reason: SURG

## 2024-05-20 RX ORDER — SODIUM CHLORIDE 9 MG/ML
40 INJECTION, SOLUTION INTRAVENOUS AS NEEDED
Status: DISCONTINUED | OUTPATIENT
Start: 2024-05-20 | End: 2024-05-20 | Stop reason: HOSPADM

## 2024-05-20 RX ORDER — ONDANSETRON 2 MG/ML
4 INJECTION INTRAMUSCULAR; INTRAVENOUS ONCE AS NEEDED
Status: DISCONTINUED | OUTPATIENT
Start: 2024-05-20 | End: 2024-05-20 | Stop reason: HOSPADM

## 2024-05-20 RX ORDER — SODIUM CHLORIDE, SODIUM LACTATE, POTASSIUM CHLORIDE, CALCIUM CHLORIDE 600; 310; 30; 20 MG/100ML; MG/100ML; MG/100ML; MG/100ML
30 INJECTION, SOLUTION INTRAVENOUS CONTINUOUS
Status: DISCONTINUED | OUTPATIENT
Start: 2024-05-20 | End: 2024-05-20 | Stop reason: HOSPADM

## 2024-05-20 RX ADMIN — PROPOFOL 60 MG: 10 INJECTION, EMULSION INTRAVENOUS at 11:26

## 2024-05-20 RX ADMIN — PROPOFOL 200 MCG/KG/MIN: 10 INJECTION, EMULSION INTRAVENOUS at 11:27

## 2024-05-20 RX ADMIN — LIDOCAINE HYDROCHLORIDE 100 MG: 20 INJECTION, SOLUTION INTRAVENOUS at 11:26

## 2024-05-20 RX ADMIN — SODIUM CHLORIDE, POTASSIUM CHLORIDE, SODIUM LACTATE AND CALCIUM CHLORIDE: 600; 310; 30; 20 INJECTION, SOLUTION INTRAVENOUS at 11:23

## 2024-05-20 NOTE — H&P
[facility]   HISTORY AND PHYSICAL    Patient Name: Toñito Kuo  : 1950  MRN: [id number]  Primary Care Physician:  Belinda Bocanegra MD  Date of admission: 2024    Subjective   Subjective     Chief Complaint: Colon cancer screening.    HPI:    Toñito Kuo is a 73 y.o. male who presents for colon cancer screening.    Review of Systems   Respiratory:  Negative for shortness of breath.    Cardiovascular:  Negative for chest pain.       Personal History     Past Medical History:   Diagnosis Date    Bladder disease     CAD (coronary artery disease) 2016    hx     Cancer     SKIN CANCER BASAL LEFT EYE AND EAR  AND SQAMOUS CELL RIGHT SIDE TORSO    Chest pain     DENIES ANY RECENT CP/SOA. FOLLOWED BY DR MARCELINO AND TRANSITION TO DR WHIPPLE. REPORTS HAS 35 [address] OF LAND AND WORKS ON IT    Coronary atherosclerosis of native coronary vessel 2015    Diabetes mellitus     type 2. DOES NOT CHECK BS DAILY    GERD (gastroesophageal reflux disease)     Hard of hearing     HEARS BETTER OUT OF LEFT EAR. BILATERAL HEARING AID    Heart attack     HTN (hypertension)     Hyperlipemia     Osteoarthritis     BILATERAL HIPS    Paroxysmal atrial fibrillation 2021    Seasonal allergies     Sleep apnea     over 10 yrs ago    Stage 3a chronic kidney disease 2021       Past Surgical History:   Procedure Laterality Date    APPENDECTOMY          COLONOSCOPY      diverticulosis    CORONARY ARTERY BYPASS GRAFT      2011    HERNIA REPAIR      SKIN CANCER EXCISION Right     TESTICLE SURGERY      TOTAL HIP ARTHROPLASTY Right 10/03/2022    Procedure: TOTAL HIP ARTHROPLASTY ANTERIOR RIGHT;  Surgeon: Ravinder Hawley MD;  Location: [facility];  Service: Orthopedics;  Laterality: Right;       Family History: family history includes Breast cancer in his paternal grandmother and sister; Colon cancer in his maternal grandmother; Diabetes in his maternal grandmother; Lung cancer in his maternal  grandfather; Melanoma in his mother. Otherwise pertinent FHx was reviewed and not pertinent to current issue.    Social History:  reports that he has never smoked. He has never used smokeless tobacco. He reports current alcohol use. He reports that he does not use drugs.    Home Medications:  Diclofenac Sodium, amLODIPine, apixaban, metFORMIN ER, metoprolol succinate XL, multivitamin with minerals, nitroglycerin, rosuvastatin, sildenafil, spironolactone, triamcinolone, and valsartan-hydrochlorothiazide    Allergies:  Allergies   Allergen Reactions    Penicillins Rash       Objective    Objective     Vitals:   Temp:  [98 °F (36.7 °C)] 98 °F (36.7 °C)  Heart Rate:  [70] 70  Resp:  [20] 20  BP: (161)/(69) 161/69    Physical Exam  HENT:      Head: Normocephalic.   Cardiovascular:      Rate and Rhythm: Normal rate.   Pulmonary:      Effort: Pulmonary effort is normal.   Abdominal:      Palpations: Abdomen is soft.   Musculoskeletal:         General: Normal range of motion.      Cervical back: Normal range of motion.   Skin:     General: Skin is warm.   Neurological:      General: No focal deficit present.      Mental Status: He is alert.   Psychiatric:         Mood and Affect: Mood normal.         Result Review    Result Review:  I have personally reviewed the results from the time of this admission to 5/20/2024 11:00 EDT and agree with these findings:  []  Laboratory  []  Microbiology  []  Radiology  []  EKG/Telemetry   []  Cardiology/Vascular   []  Pathology  []  Old records  []  Other:  Most notable findings include:     Assessment & Plan   Assessment / Plan     Brief Patient Summary:  Toñito Kuo is a 73 y.o. male who presents for colon cancer screening.    Active Hospital Problems:  Active Hospital Problems    Diagnosis     Screening for malignant neoplasm of colon     Family history of colon cancer        Plan:   Will proceed with a colonoscopy.  Risk benefits and alternatives were explained.    DVT  prophylaxis:  Mechanical DVT prophylaxis orders are present.        CODE STATUS:         Admission Status:  I believe this patient meets outpatient status.    Electronically signed by Justin Burris MD, 05/20/24, 11:00 AM EDT.

## 2024-05-20 NOTE — ANESTHESIA POSTPROCEDURE EVALUATION
Patient: Toñito Kuo    Procedure Summary       Date: 05/20/24 Room / Location: [facility] ENDOSCOPY 3 / [facility] ENDOSCOPY    Anesthesia Start: 1123 Anesthesia Stop: 1141    Procedure: COLONOSCOPY Diagnosis:       Screening for malignant neoplasm of colon      Family history of colon cancer      (Screening for malignant neoplasm of colon [Z12.11])      (Family history of colon cancer [Z80.0])    Surgeons: Justin Burris MD Provider: Saroj Sutton CRNA    Anesthesia Type: general ASA Status: 3            Anesthesia Type: general    Vitals  Vitals Value Taken Time   /63 05/20/24 1206   Temp 36.6 °C (97.8 °F) 05/20/24 1206   Pulse 57 05/20/24 1209   Resp 20 05/20/24 1206   SpO2 97 % 05/20/24 1209   Vitals shown include unfiled device data.        Post Anesthesia Care and Evaluation    Patient location during evaluation: bedside  Patient participation: complete - patient participated  Level of consciousness: awake and awake and alert  Pain management: satisfactory to patient    Airway patency: patent  Anesthetic complications: No anesthetic complications  PONV Status: none  Cardiovascular status: acceptable, hemodynamically stable and stable  Respiratory status: acceptable, nonlabored ventilation and spontaneous ventilation  Hydration status: acceptable    Comments: Patient awake and participating in care, no distress noted, ok for discharge  No anesthesia care post op

## 2024-05-22 ENCOUNTER — OFFICE VISIT (OUTPATIENT)
Dept: INTERNAL MEDICINE | Facility: CLINIC | Age: 74
End: 2024-05-22
Payer: MEDICARE

## 2024-05-22 VITALS
DIASTOLIC BLOOD PRESSURE: 68 MMHG | BODY MASS INDEX: 34.86 KG/M2 | OXYGEN SATURATION: 94 % | TEMPERATURE: 97.8 F | SYSTOLIC BLOOD PRESSURE: 122 MMHG | WEIGHT: 257.4 LBS | HEIGHT: 72 IN | HEART RATE: 43 BPM

## 2024-05-22 DIAGNOSIS — R00.1 BRADYCARDIA: Primary | ICD-10-CM

## 2024-05-22 DIAGNOSIS — I10 PRIMARY HYPERTENSION: ICD-10-CM

## 2024-05-22 DIAGNOSIS — M79.2 NEUROPATHIC PAIN: ICD-10-CM

## 2024-05-22 DIAGNOSIS — Z12.5 PROSTATE CANCER SCREENING: ICD-10-CM

## 2024-05-22 DIAGNOSIS — N18.31 STAGE 3A CHRONIC KIDNEY DISEASE: ICD-10-CM

## 2024-05-22 DIAGNOSIS — K57.90 DIVERTICULOSIS: ICD-10-CM

## 2024-05-22 DIAGNOSIS — E11.65 TYPE 2 DIABETES MELLITUS WITH HYPERGLYCEMIA, WITHOUT LONG-TERM CURRENT USE OF INSULIN: ICD-10-CM

## 2024-05-22 PROCEDURE — 1159F MED LIST DOCD IN RCRD: CPT | Performed by: STUDENT IN AN ORGANIZED HEALTH CARE EDUCATION/TRAINING PROGRAM

## 2024-05-22 PROCEDURE — 99214 OFFICE O/P EST MOD 30 MIN: CPT | Performed by: STUDENT IN AN ORGANIZED HEALTH CARE EDUCATION/TRAINING PROGRAM

## 2024-05-22 PROCEDURE — 93000 ELECTROCARDIOGRAM COMPLETE: CPT | Performed by: STUDENT IN AN ORGANIZED HEALTH CARE EDUCATION/TRAINING PROGRAM

## 2024-05-22 PROCEDURE — 3078F DIAST BP <80 MM HG: CPT | Performed by: STUDENT IN AN ORGANIZED HEALTH CARE EDUCATION/TRAINING PROGRAM

## 2024-05-22 PROCEDURE — 3074F SYST BP LT 130 MM HG: CPT | Performed by: STUDENT IN AN ORGANIZED HEALTH CARE EDUCATION/TRAINING PROGRAM

## 2024-05-22 PROCEDURE — 3044F HG A1C LEVEL LT 7.0%: CPT | Performed by: STUDENT IN AN ORGANIZED HEALTH CARE EDUCATION/TRAINING PROGRAM

## 2024-05-22 PROCEDURE — 1126F AMNT PAIN NOTED NONE PRSNT: CPT | Performed by: STUDENT IN AN ORGANIZED HEALTH CARE EDUCATION/TRAINING PROGRAM

## 2024-05-22 PROCEDURE — 1160F RVW MEDS BY RX/DR IN RCRD: CPT | Performed by: STUDENT IN AN ORGANIZED HEALTH CARE EDUCATION/TRAINING PROGRAM

## 2024-05-22 NOTE — PROGRESS NOTES
"Chief Complaint  Diabetes (Follow up) and Pain in feet (Ongoing with no improvement - occasional swelling/)    Subjective            Toñito Kuo presents to [facility]  History of Present Illness    Pt last seen in Dec 2023.      Type 2 diabetes:   CKD 3:  Chronic, presenting for lab result review.     Pain in feet w/ occasional swelling:   Discussed at last visit w/ concern for possible neuropathy given pt w/ long standing type 2 diabetes.   Pt was referred to podiatry. Today he reports that the pain in his feet are about the same.   He was seen by podiatry on 4/02/24 and review of office notes shows diminished sensation.   States pain in the feet occurs when he's gone hiking and \"on a pretty regular basis\". \"There isn't anything that I've noticed that triggers it\". \"My feet are tender all the time\"       Past Medical History:   Diagnosis Date    Bladder disease     CAD (coronary artery disease) 11/09/2016    hx     Cancer     SKIN CANCER BASAL LEFT EYE AND EAR  AND SQAMOUS CELL RIGHT SIDE TORSO    Chest pain     DENIES ANY RECENT CP/SOA. FOLLOWED BY DR MARCELINO AND TRANSITION TO DR WHIPPLE. REPORTS HAS [address] [place] AND WORKS ON IT    Coronary atherosclerosis of native coronary vessel 03/29/2015    Diabetes mellitus     type 2. DOES NOT CHECK BS DAILY    GERD (gastroesophageal reflux disease)     Hard of hearing     HEARS BETTER OUT OF LEFT EAR. BILATERAL HEARING AID    Heart attack     HTN (hypertension)     Hyperlipemia     Osteoarthritis     BILATERAL HIPS    Paroxysmal atrial fibrillation 08/11/2021    Seasonal allergies     Sleep apnea     over 10 yrs ago    Stage 3a chronic kidney disease 08/11/2021       Allergies:   Allergies   Allergen Reactions    Penicillins Rash          Past Surgical History:   Procedure Laterality Date    APPENDECTOMY      1957    COLONOSCOPY      diverticulosis    COLONOSCOPY N/A 5/20/2024    Procedure: COLONOSCOPY;  Surgeon: " Justin Burris MD;  Location: [facility] ENDOSCOPY;  Service: General;  Laterality: N/A;  DIVERTICULOSIS    CORONARY ARTERY BYPASS GRAFT      2/2011    HERNIA REPAIR      SKIN CANCER EXCISION Right     TESTICLE SURGERY  1974    TOTAL HIP ARTHROPLASTY Right 10/03/2022    Procedure: TOTAL HIP ARTHROPLASTY ANTERIOR RIGHT;  Surgeon: Ravinder Hawley MD;  Location: [facility] MAIN OR;  Service: Orthopedics;  Laterality: Right;          Social History     Socioeconomic History    Marital status:    Tobacco Use    Smoking status: Never    Smokeless tobacco: Never   Vaping Use    Vaping status: Never Used   Substance and Sexual Activity    Alcohol use: Yes     Comment: OCC    Drug use: Never    Sexual activity: Defer         Family History   Problem Relation Age of Onset    Melanoma Mother     Breast cancer Sister     Colon cancer Maternal Grandmother     Diabetes Maternal Grandmother     Lung cancer Maternal Grandfather     Breast cancer Paternal Grandmother     Malig Hyperthermia Neg Hx           Health Maintenance Due   Topic Date Due    RSV Vaccine - Adults (1 - 1-dose 60+ series) Never done    HEPATITIS C SCREENING  Never done    ZOSTER VACCINE (3 of 3) 08/07/2023    ANNUAL WELLNESS VISIT  12/20/2023    DIABETIC EYE EXAM  12/30/2023    COVID-19 Vaccine (7 - 2023-24 season) 02/29/2024            Current Outpatient Medications:     amLODIPine (NORVASC) 2.5 MG tablet, Take 1 tablet by mouth Daily., Disp: , Rfl:     apixaban (ELIQUIS) 5 MG tablet tablet, Take 1 tablet by mouth 2 (Two) Times a Day., Disp: 180 tablet, Rfl: 3    Diclofenac Sodium (VOLTAREN) 1 % gel gel, Apply 4 g topically to the appropriate area as directed 4 (Four) Times a Day As Needed (pain)., Disp: 150 g, Rfl: 3    metFORMIN ER (GLUCOPHAGE-XR) 500 MG 24 hr tablet, TAKE 1 TABLET TWICE DAILY WITH MEALS, Disp: 180 tablet, Rfl: 3    metoprolol succinate XL (TOPROL-XL) 50 MG 24 hr tablet, TAKE 1 TABLET EVERY DAY, Disp: 90 tablet, Rfl: 3    multivitamin with  minerals tablet tablet, Take 1 tablet by mouth Daily., Disp: , Rfl:     rosuvastatin (CRESTOR) 40 MG tablet, TAKE 1 TABLET EVERY DAY, Disp: 90 tablet, Rfl: 3    sildenafil (Viagra) 50 MG tablet, Take 1 tablet by mouth Daily As Needed for Erectile Dysfunction., Disp: 8 tablet, Rfl: 3    spironolactone (ALDACTONE) 25 MG tablet, TAKE 1 TABLET EVERY OTHER DAY, Disp: 45 tablet, Rfl: 3    triamcinolone (KENALOG) 0.1 % cream, 2 (Two) Times a Day As Needed., Disp: , Rfl:     valsartan-hydrochlorothiazide (DIOVAN-HCT) 160-25 MG per tablet, TAKE 1 TABLET EVERY DAY, Disp: 90 tablet, Rfl: 3    diclofenac (VOLTAREN) 75 MG EC tablet, Take 1 tablet by mouth 2 (Two) Times a Day As Needed (PAIN). Take intermittently as discussed, Disp: 120 tablet, Rfl: 1    methylPREDNISolone (MEDROL) 4 MG dose pack, Take as directed on package instructions., Disp: 21 tablet, Rfl: 0    nitroglycerin (NITROSTAT) 0.4 MG SL tablet, Place 1 tablet under the tongue Every 5 (Five) Minutes As Needed for Chest Pain. (Patient not taking: Reported on 5/22/2024), Disp: 30 tablet, Rfl: 3      Immunization History   Administered Date(s) Administered    COVID-19 (MODERNA) 1st,2nd,3rd Dose Monovalent 01/08/2021, 02/05/2021, 11/10/2021    COVID-19 (MODERNA) BIVALENT 12+YRS 11/09/2022    COVID-19 (MODERNA) Monovalent Original Booster 04/27/2022    COVID-19 F23 (MODERNA) 12YRS+ (SPIKEVAX) 10/30/2023    Fluzone High-Dose 65+yrs 10/13/2022    Fluzone Quad >6mos (Multi-dose) 09/25/2020    Hepatitis A 11/25/2020    Pneumococcal Conjugate 13-Valent (PCV13) 10/20/2015    Pneumococcal Conjugate 20-Valent (PCV20) 06/12/2023    Pneumococcal Polysaccharide (PPSV23) 11/06/2013    Td (TDVAX) 01/05/2006    Tdap 04/12/2017    Zostavax 05/30/2013         Review of Systems       Objective       Vitals:    05/22/24 1034   BP: 122/68   BP Location: Right arm   Patient Position: Sitting   Cuff Size: Adult   Pulse: (!) 43   Temp: 97.8 °F (36.6 °C)   TempSrc: Temporal   SpO2: 94%  "  Weight: 117 kg (257 lb 6.4 oz)   Height: 182.9 cm (72\")     Body mass index is 34.91 kg/m².      Physical Exam  Vitals reviewed.   Constitutional:       Appearance: Normal appearance.   HENT:      Head: Normocephalic and atraumatic.      Nose: Nose normal.   Eyes:      Extraocular Movements: Extraocular movements intact.      Conjunctiva/sclera: Conjunctivae normal.   Cardiovascular:      Rate and Rhythm: Normal rate and regular rhythm.      Pulses: Normal pulses.      Heart sounds: Normal heart sounds.   Pulmonary:      Effort: Pulmonary effort is normal. No respiratory distress.      Breath sounds: Normal breath sounds.   Musculoskeletal:         General: Normal range of motion.   Skin:     General: Skin is warm and dry.   Neurological:      General: No focal deficit present.      Mental Status: He is alert and oriented to person, place, and time.      Cranial Nerves: No cranial nerve deficit.   Psychiatric:         Mood and Affect: Mood normal.         Behavior: Behavior normal.         Thought Content: Thought content normal.           Result Review :     The following data was reviewed by: Belinda Bocanegra MD on 05/22/2024:    Common labs          12/11/2023    12:02 5/14/2024    12:14   Common Labs   Glucose 124  115    BUN 21  33    Creatinine 1.43  1.41    Sodium 140  137    Potassium 4.8  4.5    Chloride 105  103    Calcium 9.6  9.9    Albumin 4.5  4.4    Total Bilirubin 0.3  0.2    Alkaline Phosphatase 58  63    AST (SGOT) 18  22    ALT (SGPT) 14  15    WBC 7.04  7.69    Hemoglobin 11.3  11.6    Hematocrit 36.1  35.3    Platelets 310  295    Total Cholesterol 130  120    Triglycerides 79  67    HDL Cholesterol 51  49    LDL Cholesterol  63  57    Hemoglobin A1C 6.20  6.10    Microalbumin, Urine 5.8       Most Recent A1C          5/14/2024    12:14   HGBA1C Most Recent   Hemoglobin A1C 6.10               ECG 12 Lead    Date/Time: 5/22/2024 11:13 AM  Performed by: Belinda Bocanegra MD    Authorized by: " Belinda Bocanegra MD  Comparison: compared with previous ECG from 12/16/2021  Similar to previous ECG  Rhythm: sinus bradycardia  Ectopy: atrial premature contractions  Rate: bradycardic  BPM: 58  Conduction: conduction normal  T Waves: T waves normal  QRS axis: normal  Other: no other findings    Clinical impression: normal ECG              Assessment and Plan      Diagnoses and all orders for this visit:    1. Bradycardia (Primary)  Comments:  subacute with nromal EKG in office. Pt asymptomatic and is able to carry out all ADL. Follows w/ cardiology.  Orders:  -     ECG 12 Lead    2. Neuropathic pain  Comments:  chronic, over bilateral feet. Discussed gabapentin and lyrica as tx option which he would like to defer for now.    3. Primary hypertension  Comments:  Chronic, stable and in good control.  Orders:  -     Lipid Panel; Future  -     TSH; Future    4. Stage 3a chronic kidney disease  Comments:  Chronic, stable.  Assessment & Plan:      Orders:  -     CBC & Differential; Future  -     Comprehensive Metabolic Panel; Future    5. Diverticulosis  Comments:  chronic, stable on recent colonoscopy (5/20/24) no biopsy taken w/ 5yr recall.    6. Prostate cancer screening  Comments:  Due for.  Orders:  -     PSA SCREENING; Future    7. Type 2 diabetes mellitus with hyperglycemia, without long-term current use of insulin  Comments:  Chronic, stable and in good control.  Assessment & Plan:      Orders:  -     MicroAlbumin, Urine, Random - Urine, Clean Catch; Future  -     Hemoglobin A1c; Future  -     T4, Free; Future        Assessment & Plan  Bradycardia    Neuropathic pain    Primary hypertension    Stage 3a chronic kidney disease    Diverticulosis    Prostate cancer screening    Type 2 diabetes mellitus with hyperglycemia, without long-term current use of insulin      Orders Placed This Encounter   Procedures    Comprehensive Metabolic Panel    Lipid Panel    TSH    MicroAlbumin, Urine, Random - Urine, Clean Catch     Hemoglobin A1c    PSA SCREENING    T4, Free    ECG 12 Lead    CBC & Differential                        Follow Up     Return in about 6 months (around 11/22/2024) for diabetes.    Patient was given instructions and counseling regarding his condition or for health maintenance advice. Please see specific information pulled into the AVS if appropriate.     Belinda Bocanegra MD   Internal Medicine-Pediatrics

## 2024-05-30 NOTE — PROGRESS NOTES
Chief Complaint  Follow-up of the Right Hip    Subjective      Toñito Kuo presents to [facility] for follow up of his right hip.  Patient is status post right total hip arthroplasty performed Dr. Hawley on 10/3/2022.  Patient is here today stating that he has had progressive, persistent pain in the right hip with certain movements.  He still reports that he is having significant weakness within the right lower extremity as well.  Denies any falls or injuries but is concerned about the persistent right hip pain.  He reports that he is about to leave for a 3-month long vacation to the [facility] and he is concerned about this right hip pain.  He reports that he is on Eliquis and therefore he does not take anti-inflammatories consistently.    Allergies   Allergen Reactions    Penicillins Rash       Objective     Vital Signs:   Vitals:    06/04/24 0918   BP: 163/71   Pulse: 75   SpO2: 92%   PainSc: 0-No pain     There is no height or weight on file to calculate BMI.    I reviewed the patient's chief complaint, history of present illness, review of systems, past medical history, surgical history, family history, social history, medications, and allergy list.     REVIEW OF SYSTEMS    Constitutional: Denies fevers, chills, weight loss  Cardiovascular: Denies chest pain, shortness of breath  Skin: Denies rashes, acute skin changes  Neurologic: Denies headache, loss of consciousness  MSK: Right hip pain.     Ortho Exam  Hip   General: Alert. No acute distress.  Gait: Nonantalgic gait.    Right hip: Well-healed incision.  No pain with passive hip ROM.  Intact active hip ROM with mild pain. 5/5 hip strength with no pain.  Non tender over the thigh or knee distally. Demonstrates intact active ankle dorsiflexion and plantarflexion. Demonstrates intact sensation over dorsal and plantar foot.  Calf soft, nontender. Neurovascular intact distally. Palpable pedal pulses.            Imaging Results (Most Recent)       Procedure Component Value Units Date/Time    XR Hip With or Without Pelvis 2 - 3 View Right [[id number]] Resulted: 06/04/24 1241     Updated: 06/04/24 1241    Narrative:      X-Ray Report:  Study: X-rays ordered, taken in the office, and reviewed today.   Site: Right Hip Xray  Indication: Right total hip arthroplasty follow-up  View: AP, frog lateral right hip  Findings: Intact right total hip arthroplasty.  No evidence of hardware   complications or loosening.  No periprosthetic fractures are visualized.    Hip joint is reduced.  Prior studies available for comparison: yes                    Assessment and Plan   Diagnoses and all orders for this visit:    1. Right hip pain (Primary)  -     XR Hip With or Without Pelvis 2 - 3 View Right  -     diclofenac (VOLTAREN) 75 MG EC tablet; Take 1 tablet by mouth 2 (Two) Times a Day As Needed (PAIN). Take intermittently as discussed  Dispense: 120 tablet; Refill: 1  -     methylPREDNISolone (MEDROL) 4 MG dose pack; Take as directed on package instructions.  Dispense: 21 tablet; Refill: 0    2. Right hip flexor tightness  -     diclofenac (VOLTAREN) 75 MG EC tablet; Take 1 tablet by mouth 2 (Two) Times a Day As Needed (PAIN). Take intermittently as discussed  Dispense: 120 tablet; Refill: 1  -     methylPREDNISolone (MEDROL) 4 MG dose pack; Take as directed on package instructions.  Dispense: 21 tablet; Refill: 0         Toñito Kuo presents to [facility] for his right hip.  Patient is status post right total hip arthroplasty performed Dr. Hawley on 10/3/2022.  X-rays were obtained and reviewed with patient showing hardware is intact with no complications or concerns.  Discussed that the pain he is experiencing is most likely related to hip flexor tendinitis/muscle pain.  Discussed NSAIDs and appropriate dosing since he is on anticoagulation.  Additionally, Medrol Dosepak was sent to the pharmacy for  patient to use if pain/inflammation would to become severe.  We discussed the side effects associated with oral steroids.  Home exercises were provided and demonstrated in office in regards to stretching his right hip.  Discussed frequent rest breaks for stretching and walking around as he decides to travel across country, driving.  Additionally, it was recommended for him to go to physical therapy however patient is about to be traveling over the next 3 months and deferred.    Patient was advised to return after he comes home and could consider therapy at that time.  Patient has a already scheduled follow-up appointment in November for his annual recheck of his hip, but was advised to come in sooner if complications or concerns still are present.      Tobacco Use: Low Risk  (6/4/2024)    Patient History     Smoking Tobacco Use: Never     Smokeless Tobacco Use: Never     Passive Exposure: Not on file     Patient reports that they are a nonsmoker; cessation education not applicable.            Follow Up   Return for Keep previously scheduled follow up appointment.  There are no Patient Instructions on file for this visit.  Patient was given instructions and counseling regarding his condition or for health maintenance advice. Please see specific information pulled into the AVS if appropriate.       Dictated Utilizing Dragon Dictation. Please note that portions of this note were completed with a voice recognition program. Part of this note may be an electronic transcription/translation of spoken language to printed text using the Dragon Dictation System.

## 2024-06-04 ENCOUNTER — OFFICE VISIT (OUTPATIENT)
Dept: ORTHOPEDIC SURGERY | Facility: CLINIC | Age: 74
End: 2024-06-04
Payer: MEDICARE

## 2024-06-04 VITALS — SYSTOLIC BLOOD PRESSURE: 163 MMHG | DIASTOLIC BLOOD PRESSURE: 71 MMHG | OXYGEN SATURATION: 92 % | HEART RATE: 75 BPM

## 2024-06-04 DIAGNOSIS — M25.551 RIGHT HIP PAIN: Primary | ICD-10-CM

## 2024-06-04 DIAGNOSIS — M24.551 RIGHT HIP FLEXOR TIGHTNESS: ICD-10-CM

## 2024-06-04 PROCEDURE — 1160F RVW MEDS BY RX/DR IN RCRD: CPT

## 2024-06-04 PROCEDURE — 3078F DIAST BP <80 MM HG: CPT

## 2024-06-04 PROCEDURE — 3077F SYST BP >= 140 MM HG: CPT

## 2024-06-04 PROCEDURE — 1159F MED LIST DOCD IN RCRD: CPT

## 2024-06-04 PROCEDURE — 99214 OFFICE O/P EST MOD 30 MIN: CPT

## 2024-06-04 RX ORDER — DICLOFENAC SODIUM 75 MG/1
75 TABLET, DELAYED RELEASE ORAL 2 TIMES DAILY PRN
Qty: 120 TABLET | Refills: 1 | Status: SHIPPED | OUTPATIENT
Start: 2024-06-04

## 2024-06-04 RX ORDER — METHYLPREDNISOLONE 4 MG/1
TABLET ORAL
Qty: 21 TABLET | Refills: 0 | Status: SHIPPED | OUTPATIENT
Start: 2024-06-04

## 2024-08-12 RX ORDER — METOPROLOL SUCCINATE 50 MG/1
TABLET, EXTENDED RELEASE ORAL
Qty: 90 TABLET | Refills: 3 | Status: SHIPPED | OUTPATIENT
Start: 2024-08-12

## 2024-10-07 RX ORDER — VALSARTAN AND HYDROCHLOROTHIAZIDE 160; 25 MG/1; MG/1
1 TABLET ORAL DAILY
Qty: 90 TABLET | Refills: 3 | Status: SHIPPED | OUTPATIENT
Start: 2024-10-07

## 2024-10-14 ENCOUNTER — OFFICE VISIT (OUTPATIENT)
Dept: CARDIOLOGY | Facility: CLINIC | Age: 74
End: 2024-10-14
Payer: MEDICARE

## 2024-10-14 ENCOUNTER — LAB (OUTPATIENT)
Dept: LAB | Facility: HOSPITAL | Age: 74
End: 2024-10-14
Payer: MEDICARE

## 2024-10-14 VITALS
SYSTOLIC BLOOD PRESSURE: 143 MMHG | HEART RATE: 64 BPM | BODY MASS INDEX: 35.49 KG/M2 | WEIGHT: 262 LBS | DIASTOLIC BLOOD PRESSURE: 91 MMHG | HEIGHT: 72 IN

## 2024-10-14 DIAGNOSIS — N18.31 STAGE 3A CHRONIC KIDNEY DISEASE: ICD-10-CM

## 2024-10-14 DIAGNOSIS — I10 HYPERTENSION, ESSENTIAL: ICD-10-CM

## 2024-10-14 DIAGNOSIS — I10 PRIMARY HYPERTENSION: ICD-10-CM

## 2024-10-14 DIAGNOSIS — I48.0 PAROXYSMAL ATRIAL FIBRILLATION: ICD-10-CM

## 2024-10-14 DIAGNOSIS — Z12.5 PROSTATE CANCER SCREENING: ICD-10-CM

## 2024-10-14 DIAGNOSIS — I25.10 CORONARY ARTERY DISEASE INVOLVING NATIVE CORONARY ARTERY OF NATIVE HEART WITHOUT ANGINA PECTORIS: Primary | ICD-10-CM

## 2024-10-14 DIAGNOSIS — E11.65 TYPE 2 DIABETES MELLITUS WITH HYPERGLYCEMIA, WITHOUT LONG-TERM CURRENT USE OF INSULIN: ICD-10-CM

## 2024-10-14 DIAGNOSIS — E78.2 HYPERLIPEMIA, MIXED: ICD-10-CM

## 2024-10-14 LAB
ALBUMIN SERPL-MCNC: 4.4 G/DL (ref 3.5–5.2)
ALBUMIN UR-MCNC: 3.7 MG/DL
ALBUMIN/GLOB SERPL: 1.5 G/DL
ALP SERPL-CCNC: 60 U/L (ref 39–117)
ALT SERPL W P-5'-P-CCNC: 20 U/L (ref 1–41)
ANION GAP SERPL CALCULATED.3IONS-SCNC: 10.5 MMOL/L (ref 5–15)
AST SERPL-CCNC: 26 U/L (ref 1–40)
BASOPHILS # BLD AUTO: 0.08 10*3/MM3 (ref 0–0.2)
BASOPHILS NFR BLD AUTO: 1 % (ref 0–1.5)
BILIRUB SERPL-MCNC: 0.3 MG/DL (ref 0–1.2)
BUN SERPL-MCNC: 40 MG/DL (ref 8–23)
BUN/CREAT SERPL: 18.5 (ref 7–25)
CALCIUM SPEC-SCNC: 9.5 MG/DL (ref 8.6–10.5)
CHLORIDE SERPL-SCNC: 103 MMOL/L (ref 98–107)
CHOLEST SERPL-MCNC: 117 MG/DL (ref 0–200)
CO2 SERPL-SCNC: 23.5 MMOL/L (ref 22–29)
CREAT SERPL-MCNC: 2.16 MG/DL (ref 0.76–1.27)
DEPRECATED RDW RBC AUTO: 44.6 FL (ref 37–54)
EGFRCR SERPLBLD CKD-EPI 2021: 31 ML/MIN/1.73
EOSINOPHIL # BLD AUTO: 0.44 10*3/MM3 (ref 0–0.4)
EOSINOPHIL NFR BLD AUTO: 5.6 % (ref 0.3–6.2)
ERYTHROCYTE [DISTWIDTH] IN BLOOD BY AUTOMATED COUNT: 12.9 % (ref 12.3–15.4)
GLOBULIN UR ELPH-MCNC: 2.9 GM/DL
GLUCOSE SERPL-MCNC: 105 MG/DL (ref 65–99)
HBA1C MFR BLD: 6 % (ref 4.8–5.6)
HCT VFR BLD AUTO: 32.8 % (ref 37.5–51)
HDLC SERPL-MCNC: 41 MG/DL (ref 40–60)
HGB BLD-MCNC: 11.2 G/DL (ref 13–17.7)
IMM GRANULOCYTES # BLD AUTO: 0.04 10*3/MM3 (ref 0–0.05)
IMM GRANULOCYTES NFR BLD AUTO: 0.5 % (ref 0–0.5)
LDLC SERPL CALC-MCNC: 60 MG/DL (ref 0–100)
LDLC/HDLC SERPL: 1.45 {RATIO}
LYMPHOCYTES # BLD AUTO: 1.13 10*3/MM3 (ref 0.7–3.1)
LYMPHOCYTES NFR BLD AUTO: 14.5 % (ref 19.6–45.3)
MCH RBC QN AUTO: 32.3 PG (ref 26.6–33)
MCHC RBC AUTO-ENTMCNC: 34.1 G/DL (ref 31.5–35.7)
MCV RBC AUTO: 94.5 FL (ref 79–97)
MONOCYTES # BLD AUTO: 0.89 10*3/MM3 (ref 0.1–0.9)
MONOCYTES NFR BLD AUTO: 11.4 % (ref 5–12)
NEUTROPHILS NFR BLD AUTO: 5.24 10*3/MM3 (ref 1.7–7)
NEUTROPHILS NFR BLD AUTO: 67 % (ref 42.7–76)
NRBC BLD AUTO-RTO: 0 /100 WBC (ref 0–0.2)
PLATELET # BLD AUTO: 277 10*3/MM3 (ref 140–450)
PMV BLD AUTO: 9.6 FL (ref 6–12)
POTASSIUM SERPL-SCNC: 5.1 MMOL/L (ref 3.5–5.2)
PROT SERPL-MCNC: 7.3 G/DL (ref 6–8.5)
PSA SERPL-MCNC: 2 NG/ML (ref 0–4)
RBC # BLD AUTO: 3.47 10*6/MM3 (ref 4.14–5.8)
SODIUM SERPL-SCNC: 137 MMOL/L (ref 136–145)
T4 FREE SERPL-MCNC: 1.29 NG/DL (ref 0.92–1.68)
TRIGL SERPL-MCNC: 82 MG/DL (ref 0–150)
TSH SERPL DL<=0.05 MIU/L-ACNC: 3.07 UIU/ML (ref 0.27–4.2)
VLDLC SERPL-MCNC: 16 MG/DL (ref 5–40)
WBC NRBC COR # BLD AUTO: 7.82 10*3/MM3 (ref 3.4–10.8)

## 2024-10-14 PROCEDURE — 1160F RVW MEDS BY RX/DR IN RCRD: CPT | Performed by: INTERNAL MEDICINE

## 2024-10-14 PROCEDURE — 99214 OFFICE O/P EST MOD 30 MIN: CPT | Performed by: INTERNAL MEDICINE

## 2024-10-14 PROCEDURE — 3079F DIAST BP 80-89 MM HG: CPT | Performed by: INTERNAL MEDICINE

## 2024-10-14 PROCEDURE — 1159F MED LIST DOCD IN RCRD: CPT | Performed by: INTERNAL MEDICINE

## 2024-10-14 PROCEDURE — 82043 UR ALBUMIN QUANTITATIVE: CPT

## 2024-10-14 PROCEDURE — 84439 ASSAY OF FREE THYROXINE: CPT

## 2024-10-14 PROCEDURE — 80061 LIPID PANEL: CPT

## 2024-10-14 PROCEDURE — 3077F SYST BP >= 140 MM HG: CPT | Performed by: INTERNAL MEDICINE

## 2024-10-14 PROCEDURE — 85025 COMPLETE CBC W/AUTO DIFF WBC: CPT

## 2024-10-14 PROCEDURE — 36415 COLL VENOUS BLD VENIPUNCTURE: CPT

## 2024-10-14 PROCEDURE — 80053 COMPREHEN METABOLIC PANEL: CPT

## 2024-10-14 PROCEDURE — 84443 ASSAY THYROID STIM HORMONE: CPT

## 2024-10-14 PROCEDURE — 83036 HEMOGLOBIN GLYCOSYLATED A1C: CPT

## 2024-10-14 PROCEDURE — G0103 PSA SCREENING: HCPCS

## 2024-10-14 NOTE — PROGRESS NOTES
Chief Complaint  Coronary Artery Disease, Atrial Fibrillation, Hypertension, and Hyperlipidemia    Subjective            Toñito Kuo presents to [facility]    Toñito is here for follow-up evaluation management of coronary artery disease with CABG, paroxysmal atrial fibrillation, essential hypertension, mixed hyperlipidemia, bilateral leg edema.  He has been clinically stable.  He continues to have leg edema but it seems to be under control.    PMH  Past Medical History:   Diagnosis Date    Bladder disease     CAD (coronary artery disease) 11/09/2016    hx     Cancer     SKIN CANCER BASAL LEFT EYE AND EAR  AND SQAMOUS CELL RIGHT SIDE TORSO    Chest pain     DENIES ANY RECENT CP/SOA. FOLLOWED BY DR MARCELINO AND TRANSITION TO DR WHIPPLE. REPORTS HAS [address] OF LAND AND WORKS ON IT    Coronary atherosclerosis of native coronary vessel 03/29/2015    Diabetes mellitus     type 2. DOES NOT CHECK BS DAILY    GERD (gastroesophageal reflux disease)     Hard of hearing     HEARS BETTER OUT OF LEFT EAR. BILATERAL HEARING AID    Heart attack     HTN (hypertension)     Hyperlipemia     Osteoarthritis     BILATERAL HIPS    Paroxysmal atrial fibrillation 08/11/2021    Seasonal allergies     Sleep apnea     over 10 yrs ago    Stage 3a chronic kidney disease 08/11/2021         SURGICALHX  Past Surgical History:   Procedure Laterality Date    APPENDECTOMY      1957    COLONOSCOPY      diverticulosis    COLONOSCOPY N/A 5/20/2024    Procedure: COLONOSCOPY;  Surgeon: Justin Burris MD;  Location: [facility] ENDOSCOPY;  Service: General;  Laterality: N/A;  DIVERTICULOSIS    CORONARY ARTERY BYPASS GRAFT      2/2011    HERNIA REPAIR      SKIN CANCER EXCISION Right     TESTICLE SURGERY  1974    TOTAL HIP ARTHROPLASTY Right 10/03/2022    Procedure: TOTAL HIP ARTHROPLASTY ANTERIOR RIGHT;  Surgeon: Ravinder Hawley MD;  Location: [facility] MAIN OR;  Service: Orthopedics;  Laterality: Right;        SOC  Social History      Socioeconomic History    Marital status:    Tobacco Use    Smoking status: Never    Smokeless tobacco: Never   Vaping Use    Vaping status: Never Used   Substance and Sexual Activity    Alcohol use: Yes     Comment: OCC    Drug use: Never    Sexual activity: Defer         FAMHX  Family History   Problem Relation Age of Onset    Melanoma Mother     Breast cancer Sister     Colon cancer Maternal Grandmother     Diabetes Maternal Grandmother     Lung cancer Maternal Grandfather     Breast cancer Paternal Grandmother     Malig Hyperthermia Neg Hx           ALLERGY  Allergies   Allergen Reactions    Penicillins Rash        MEDSCURRENT    Current Outpatient Medications:     amLODIPine (NORVASC) 2.5 MG tablet, Take 1 tablet by mouth Daily., Disp: , Rfl:     apixaban (ELIQUIS) 5 MG tablet tablet, Take 1 tablet by mouth 2 (Two) Times a Day., Disp: 180 tablet, Rfl: 3    diclofenac (VOLTAREN) 75 MG EC tablet, Take 1 tablet by mouth 2 (Two) Times a Day As Needed (PAIN). Take intermittently as discussed, Disp: 120 tablet, Rfl: 1    Diclofenac Sodium (VOLTAREN) 1 % gel gel, Apply 4 g topically to the appropriate area as directed 4 (Four) Times a Day As Needed (pain)., Disp: 150 g, Rfl: 3    metFORMIN ER (GLUCOPHAGE-XR) 500 MG 24 hr tablet, TAKE 1 TABLET TWICE DAILY WITH MEALS, Disp: 180 tablet, Rfl: 3    methylPREDNISolone (MEDROL) 4 MG dose pack, Take as directed on package instructions., Disp: 21 tablet, Rfl: 0    metoprolol succinate XL (TOPROL-XL) 50 MG 24 hr tablet, TAKE 1 TABLET EVERY DAY, Disp: 90 tablet, Rfl: 3    multivitamin with minerals tablet tablet, Take 1 tablet by mouth Daily., Disp: , Rfl:     nitroglycerin (NITROSTAT) 0.4 MG SL tablet, Place 1 tablet under the tongue Every 5 (Five) Minutes As Needed for Chest Pain., Disp: 30 tablet, Rfl: 3    rosuvastatin (CRESTOR) 40 MG tablet, TAKE 1 TABLET EVERY DAY, Disp: 90 tablet, Rfl: 3    sildenafil (Viagra) 50 MG tablet, Take 1 tablet by mouth Daily As Needed  "for Erectile Dysfunction., Disp: 8 tablet, Rfl: 3    spironolactone (ALDACTONE) 25 MG tablet, TAKE 1 TABLET EVERY OTHER DAY, Disp: 45 tablet, Rfl: 3    triamcinolone (KENALOG) 0.1 % cream, 2 (Two) Times a Day As Needed., Disp: , Rfl:     valsartan-hydrochlorothiazide (DIOVAN-HCT) 160-25 MG per tablet, TAKE 1 TABLET EVERY DAY, Disp: 90 tablet, Rfl: 3      Review of Systems   Constitutional: Negative for malaise/fatigue.   Cardiovascular:  Negative for chest pain, dyspnea on exertion and leg swelling.   Hematologic/Lymphatic: Does not bruise/bleed easily.   Neurological:  Positive for dizziness and vertigo.        Objective     /91   Pulse 64   Ht 182.9 cm (72\")   Wt 119 kg (262 lb)   BMI 35.53 kg/m²       General Appearance:   well developed  well nourished  HENT:   oropharynx moist  lips not cyanotic  Neck:  thyroid not enlarged  supple  Respiratory:  no respiratory distress  normal breath sounds  no rales  Cardiovascular:  no jugular venous distention  regular rhythm  apical impulse normal  S1 normal, S2 normal  no S3, no S4   no murmur  no rub, no thrill  carotid pulses normal; no bruit  pedal pulses normal  lower extremity edema: 1+ edema  Musculoskeletal:  no clubbing of fingers.   normocephalic, head atraumatic  Skin:   warm, dry  Psychiatric:  judgement and insight appropriate  normal mood and affect      Result Review :     The following data was reviewed by: Jonn Dang MD on 01/04/2023:    CMP          12/11/2023    12:02 5/14/2024    12:14   CMP   Glucose 124  115    BUN 21  33    Creatinine 1.43  1.41    EGFR 51.1  52.0    Sodium 140  137    Potassium 4.8  4.5    Chloride 105  103    Calcium 9.6  9.9    Total Protein 7.3  7.3    Albumin 4.5  4.4    Globulin 2.8  2.9    Total Bilirubin 0.3  0.2    Alkaline Phosphatase 58  63    AST (SGOT) 18  22    ALT (SGPT) 14  15    Albumin/Globulin Ratio 1.6  1.5    BUN/Creatinine Ratio 14.7  23.4    Anion Gap 11.4  9.6      CBC          " 12/11/2023    12:02 5/14/2024    12:14   CBC   WBC 7.04  7.69    RBC 4.00  3.82    Hemoglobin 11.3  11.6    Hematocrit 36.1  35.3    MCV 90.3  92.4    MCH 28.3  30.4    MCHC 31.3  32.9    RDW 12.0  13.0    Platelets 310  295      Lipid Panel          12/11/2023    12:02 5/14/2024    12:14   Lipid Panel   Total Cholesterol 130  120    Triglycerides 79  67    HDL Cholesterol 51  49    VLDL Cholesterol 16  14    LDL Cholesterol  63  57    LDL/HDL Ratio 1.24  1.18      TSH          12/11/2023    12:02 5/14/2024    12:14   TSH   TSH 2.350  2.320      Data reviewed: Previous cardiac records reviewed      Procedures                  Assessment and Plan        ASSESSMENT:  Encounter Diagnoses   Name Primary?    Coronary artery disease involving native coronary artery of native heart without angina pectoris Yes    Paroxysmal atrial fibrillation     Hypertension, essential     Hyperlipemia, mixed          PLAN:    1.  Coronary artery disease with CABG-clinically stable without angina.  Continue current medical therapy  2.  Paroxysmal atrial fibrillation-clinically stable, continue Eliquis and beta-blocker  3.  Mixed hyperlipidemia, controlled, continue statin therapy  4.  Primary hypertension-mildly elevated today, focus on weight loss and low-sodium nutrition  5.  Bilateral leg edema-recommend low-sodium, elevation, compression and attempts at weight loss while avoiding more potent diuretics due to chronic kidney disease    Follow-up in about 10 months          Patient was given instructions and counseling regarding his condition or for health maintenance advice. Please see specific information pulled into the AVS if appropriate.             CINDY Dang MD  10/14/2024    09:52 EDT

## 2024-10-22 ENCOUNTER — TELEPHONE (OUTPATIENT)
Dept: INTERNAL MEDICINE | Facility: CLINIC | Age: 74
End: 2024-10-22
Payer: MEDICARE

## 2024-10-22 NOTE — TELEPHONE ENCOUNTER
Called and left message for patient to call office and give last blood pressure reading if patient checked blood pressure at home

## 2024-10-24 RX ORDER — APIXABAN 5 MG/1
5 TABLET, FILM COATED ORAL 2 TIMES DAILY
Qty: 180 TABLET | Refills: 3 | Status: SHIPPED | OUTPATIENT
Start: 2024-10-24

## 2024-11-05 ENCOUNTER — OFFICE VISIT (OUTPATIENT)
Dept: ORTHOPEDIC SURGERY | Facility: CLINIC | Age: 74
End: 2024-11-05
Payer: MEDICARE

## 2024-11-05 VITALS
OXYGEN SATURATION: 96 % | WEIGHT: 262.35 LBS | HEART RATE: 75 BPM | BODY MASS INDEX: 35.53 KG/M2 | DIASTOLIC BLOOD PRESSURE: 70 MMHG | SYSTOLIC BLOOD PRESSURE: 122 MMHG | HEIGHT: 72 IN

## 2024-11-05 DIAGNOSIS — M25.551 RIGHT HIP PAIN: Primary | ICD-10-CM

## 2024-11-05 DIAGNOSIS — Z47.1 AFTERCARE FOLLOWING RIGHT HIP JOINT REPLACEMENT SURGERY: ICD-10-CM

## 2024-11-05 DIAGNOSIS — M76.31 IT BAND SYNDROME, RIGHT: ICD-10-CM

## 2024-11-05 DIAGNOSIS — Z96.641 AFTERCARE FOLLOWING RIGHT HIP JOINT REPLACEMENT SURGERY: ICD-10-CM

## 2024-11-05 DIAGNOSIS — M24.551 RIGHT HIP FLEXOR TIGHTNESS: ICD-10-CM

## 2024-11-05 DIAGNOSIS — Z96.641 HISTORY OF TOTAL HIP ARTHROPLASTY, RIGHT: ICD-10-CM

## 2024-11-05 PROCEDURE — 3078F DIAST BP <80 MM HG: CPT

## 2024-11-05 PROCEDURE — 3074F SYST BP LT 130 MM HG: CPT

## 2024-11-05 PROCEDURE — 99213 OFFICE O/P EST LOW 20 MIN: CPT

## 2024-11-05 PROCEDURE — 1159F MED LIST DOCD IN RCRD: CPT

## 2024-11-05 PROCEDURE — 1160F RVW MEDS BY RX/DR IN RCRD: CPT

## 2024-11-05 NOTE — PROGRESS NOTES
"Chief Complaint  Follow-up of the Right Hip    Subjective      Toñito Kuo presents to [facility] for follow up of his right hip.  Patient is 2 year status post right total hip arthroplasty performed Dr. Hawley on 10/3/2022.  Patient arrives today independently ambulatory without the use of any assistive devices.  Patient states that he was doing well from a hip standpoint but is still experiencing quite a bit of hip flexor tendinitis as well as some lateral IT band tendinitis.  He denies taking any anti-inflammatories or steroids previously.  He reports that he does have some tenderness with laying on his hip and still experiences some weakness of his bilateral lower extremities.    Allergies   Allergen Reactions    Penicillins Rash       Objective     Vital Signs:   Vitals:    11/05/24 1047   BP: 122/70   Pulse: 75   SpO2: 96%   Weight: 119 kg (262 lb 5.6 oz)   Height: 182.9 cm (72\")     Body mass index is 35.58 kg/m².    I reviewed the patient's chief complaint, history of present illness, review of systems, past medical history, surgical history, family history, social history, medications, and allergy list.     REVIEW OF SYSTEMS    Constitutional: Denies fevers, chills, weight loss  Cardiovascular: Denies chest pain, shortness of breath  Skin: Denies rashes, acute skin changes  Neurologic: Denies headache, loss of consciousness  MSK: Right hip pain.     Ortho Exam  Hip   General: Alert. No acute distress.  Gait: Nonantalgic gait.    Right hip: Incision well-healed.  No pain with passive hip ROM.  Intact active hip ROM with mild tenderness to palpation over the lateral hip. Non tender over the thigh or knee distally. Demonstrates intact active ankle dorsiflexion and plantarflexion. Demonstrates intact sensation over dorsal and plantar foot.  Calf soft, nontender. Neurovascular intact distally. Palpable pedal pulses.           Imaging Results (Most Recent)       Procedure Component " Value Units Date/Time    XR Hip With or Without Pelvis 2 - 3 View Right [[id number]] Resulted: 11/05/24 1426     Updated: 11/05/24 1426    Narrative:      X-Ray Report:  Study: X-rays ordered, taken in the office, and reviewed today.   Site: Right Hip Xray  Indication: Right total hip arthroplasty follow-up  View: AP, frog lateral right hip  Findings: Intact right total hip arthroplasty.  No evidence of hardware   complications or loosening.  No periprosthetic fractures are visualized.    Hip joint is reduced.  Prior studies available for comparison: yes                    Assessment and Plan   Diagnoses and all orders for this visit:    1. Right hip pain (Primary)  -     XR Hip With or Without Pelvis 2 - 3 View Right    2. Right hip flexor tightness    3. Aftercare following right hip joint replacement surgery    4. History of total hip arthroplasty, right    5. It band syndrome, right         Toñito Kuo is 2 year post-op right total hip arthroplasty performed by Dr Hawley on 10/3/2022. X-rays reviewed, showing hardware intact and no complications.  Patient is doing well. Continue home exercise program to progress strength and ROM.     Discussed the importance of lifelong antibiotic prophylaxis with dental procedures following total joint replacement. In the event of a dental procedure, patient is welcome to contact our office to obtain antibiotics prior to the procedure if their dentist does not provide the prescription. Patient verbalized understanding.     Discussed and provided patient with specific exercises/stretching to help relieve the IT band tendinitis that he is experiencing.  Stressed the use of anti-inflammatories to help with pain and inflammation.    Follow-up in 1 year. We will repeat xray's of the prosthetic joint at that time.   Call sooner or return to care, if needed with any changes or concerns.        Tobacco Use: Low Risk  (11/5/2024)    Patient History     Smoking Tobacco Use: Never      Smokeless Tobacco Use: Never     Passive Exposure: Not on file     Patient reports that they are a nonsmoker; cessation education not applicable.            Follow Up   Return in about 1 year (around 11/5/2025).  There are no Patient Instructions on file for this visit.  Patient was given instructions and counseling regarding his condition or for health maintenance advice. Please see specific information pulled into the AVS if appropriate.       Dictated Utilizing Dragon Dictation. Please note that portions of this note were completed with a voice recognition program. Part of this note may be an electronic transcription/translation of spoken language to printed text using the Dragon Dictation System.

## 2024-11-10 DIAGNOSIS — N18.32 STAGE 3B CHRONIC KIDNEY DISEASE: Primary | ICD-10-CM

## 2024-12-06 ENCOUNTER — LAB (OUTPATIENT)
Facility: HOSPITAL | Age: 74
End: 2024-12-06
Payer: MEDICARE

## 2024-12-06 LAB
ALBUMIN SERPL-MCNC: 4.2 G/DL (ref 3.5–5.2)
ANION GAP SERPL CALCULATED.3IONS-SCNC: 10.1 MMOL/L (ref 5–15)
BACTERIA UR QL AUTO: NORMAL /HPF
BILIRUB UR QL STRIP: NEGATIVE
BUN SERPL-MCNC: 44 MG/DL (ref 8–23)
BUN/CREAT SERPL: 23.2 (ref 7–25)
CALCIUM SPEC-SCNC: 9.6 MG/DL (ref 8.6–10.5)
CHLORIDE SERPL-SCNC: 103 MMOL/L (ref 98–107)
CLARITY UR: CLEAR
CO2 SERPL-SCNC: 23.9 MMOL/L (ref 22–29)
COLOR UR: YELLOW
CREAT SERPL-MCNC: 1.9 MG/DL (ref 0.76–1.27)
EGFRCR SERPLBLD CKD-EPI 2021: 36.1 ML/MIN/1.73
GLUCOSE SERPL-MCNC: 116 MG/DL (ref 65–99)
GLUCOSE UR STRIP-MCNC: NEGATIVE MG/DL
HGB UR QL STRIP.AUTO: NEGATIVE
HYALINE CASTS UR QL AUTO: NORMAL /LPF
KETONES UR QL STRIP: NEGATIVE
LEUKOCYTE ESTERASE UR QL STRIP.AUTO: NEGATIVE
NITRITE UR QL STRIP: NEGATIVE
PH UR STRIP.AUTO: 6 [PH] (ref 5–8)
PHOSPHATE SERPL-MCNC: 3.2 MG/DL (ref 2.5–4.5)
POTASSIUM SERPL-SCNC: 5.1 MMOL/L (ref 3.5–5.2)
PROT UR QL STRIP: ABNORMAL
RBC # UR STRIP: NORMAL /HPF
REF LAB TEST METHOD: NORMAL
SODIUM SERPL-SCNC: 137 MMOL/L (ref 136–145)
SP GR UR STRIP: 1.01 (ref 1–1.03)
SQUAMOUS #/AREA URNS HPF: NORMAL /HPF
UROBILINOGEN UR QL STRIP: ABNORMAL
WBC # UR STRIP: NORMAL /HPF

## 2024-12-06 PROCEDURE — 81001 URINALYSIS AUTO W/SCOPE: CPT | Performed by: STUDENT IN AN ORGANIZED HEALTH CARE EDUCATION/TRAINING PROGRAM

## 2024-12-06 PROCEDURE — 80069 RENAL FUNCTION PANEL: CPT | Performed by: STUDENT IN AN ORGANIZED HEALTH CARE EDUCATION/TRAINING PROGRAM

## 2024-12-10 ENCOUNTER — OFFICE VISIT (OUTPATIENT)
Dept: INTERNAL MEDICINE | Facility: CLINIC | Age: 74
End: 2024-12-10
Payer: MEDICARE

## 2024-12-10 VITALS
OXYGEN SATURATION: 96 % | WEIGHT: 262 LBS | RESPIRATION RATE: 16 BRPM | BODY MASS INDEX: 35.49 KG/M2 | DIASTOLIC BLOOD PRESSURE: 68 MMHG | HEART RATE: 65 BPM | HEIGHT: 72 IN | SYSTOLIC BLOOD PRESSURE: 122 MMHG | TEMPERATURE: 97.4 F

## 2024-12-10 DIAGNOSIS — Z71.85 IMMUNIZATION COUNSELING: ICD-10-CM

## 2024-12-10 DIAGNOSIS — M79.89 LEG SWELLING: ICD-10-CM

## 2024-12-10 DIAGNOSIS — Z00.00 MEDICARE ANNUAL WELLNESS VISIT, SUBSEQUENT: Primary | ICD-10-CM

## 2024-12-10 DIAGNOSIS — N18.32 STAGE 3B CHRONIC KIDNEY DISEASE: ICD-10-CM

## 2024-12-10 DIAGNOSIS — I48.0 PAROXYSMAL ATRIAL FIBRILLATION: Chronic | ICD-10-CM

## 2024-12-10 DIAGNOSIS — S89.92XA INJURY OF LEFT LOWER EXTREMITY, INITIAL ENCOUNTER: ICD-10-CM

## 2024-12-10 PROCEDURE — 1126F AMNT PAIN NOTED NONE PRSNT: CPT | Performed by: STUDENT IN AN ORGANIZED HEALTH CARE EDUCATION/TRAINING PROGRAM

## 2024-12-10 PROCEDURE — G0439 PPPS, SUBSEQ VISIT: HCPCS | Performed by: STUDENT IN AN ORGANIZED HEALTH CARE EDUCATION/TRAINING PROGRAM

## 2024-12-10 PROCEDURE — 3074F SYST BP LT 130 MM HG: CPT | Performed by: STUDENT IN AN ORGANIZED HEALTH CARE EDUCATION/TRAINING PROGRAM

## 2024-12-10 PROCEDURE — 99214 OFFICE O/P EST MOD 30 MIN: CPT | Performed by: STUDENT IN AN ORGANIZED HEALTH CARE EDUCATION/TRAINING PROGRAM

## 2024-12-10 PROCEDURE — 3078F DIAST BP <80 MM HG: CPT | Performed by: STUDENT IN AN ORGANIZED HEALTH CARE EDUCATION/TRAINING PROGRAM

## 2024-12-10 RX ORDER — CEPHALEXIN 500 MG/1
500 CAPSULE ORAL 3 TIMES DAILY
Qty: 21 CAPSULE | Refills: 0 | Status: SHIPPED | OUTPATIENT
Start: 2024-12-10 | End: 2024-12-17

## 2024-12-10 RX ORDER — CEPHALEXIN 500 MG/1
500 CAPSULE ORAL 3 TIMES DAILY
Qty: 21 CAPSULE | Refills: 0 | Status: SHIPPED | OUTPATIENT
Start: 2024-12-10 | End: 2024-12-10

## 2024-12-10 NOTE — PROGRESS NOTES
Subjective   The ABCs of the Annual Wellness Visit  Medicare Wellness Visit      Toñito Kuo is a 74 y.o. patient who presents for a Medicare Wellness Visit.    The following portions of the patient's history were reviewed and   updated as appropriate: allergies, current medications, past family history, past medical history, past social history, past surgical history, and problem list.    Compared to one year ago, the patient's physical   health is the same.  Compared to one year ago, the patient's mental   health is the same.    Recent Hospitalizations:  He was not admitted to the hospital during the last year.     Current Medical Providers:  Patient Care Team:  Belinda Bocanegra MD as PCP - General (Internal Medicine)  Atnoinette Tobar MD as Consulting Physician (Cardiology)  CINDY Dang MD as Consulting Physician (Cardiology)  Ravinder Hawley MD as Consulting Physician (Orthopedic Surgery)    Outpatient Medications Prior to Visit   Medication Sig Dispense Refill    Diclofenac Sodium (VOLTAREN) 1 % gel gel Apply 4 g topically to the appropriate area as directed 4 (Four) Times a Day As Needed (pain). 150 g 3    Eliquis 5 MG tablet tablet TAKE 1 TABLET TWICE DAILY 180 tablet 3    metFORMIN ER (GLUCOPHAGE-XR) 500 MG 24 hr tablet TAKE 1 TABLET TWICE DAILY WITH MEALS 180 tablet 3    metoprolol succinate XL (TOPROL-XL) 50 MG 24 hr tablet TAKE 1 TABLET EVERY DAY 90 tablet 3    multivitamin with minerals tablet tablet Take 1 tablet by mouth Daily.      nitroglycerin (NITROSTAT) 0.4 MG SL tablet Place 1 tablet under the tongue Every 5 (Five) Minutes As Needed for Chest Pain. 30 tablet 3    rosuvastatin (CRESTOR) 40 MG tablet TAKE 1 TABLET EVERY DAY 90 tablet 3    sildenafil (Viagra) 50 MG tablet Take 1 tablet by mouth Daily As Needed for Erectile Dysfunction. 8 tablet 3    valsartan-hydrochlorothiazide (DIOVAN-HCT) 160-25 MG per tablet TAKE 1 TABLET EVERY DAY 90 tablet 3    spironolactone (ALDACTONE) 25 MG tablet  TAKE 1 TABLET EVERY OTHER DAY (Patient taking differently: Take 1 tablet by mouth Every Other Day. TAKE 1 TABLET EVERY OTHER DAY) 45 tablet 3    amLODIPine (NORVASC) 2.5 MG tablet Take 1 tablet by mouth Daily.      methylPREDNISolone (MEDROL) 4 MG dose pack Take as directed on package instructions. (Patient not taking: Reported on 12/26/2024) 21 tablet 0    triamcinolone (KENALOG) 0.1 % cream 2 (Two) Times a Day As Needed.      diclofenac (VOLTAREN) 75 MG EC tablet Take 1 tablet by mouth 2 (Two) Times a Day As Needed (PAIN). Take intermittently as discussed (Patient not taking: Reported on 12/10/2024) 120 tablet 1     No facility-administered medications prior to visit.     No opioid medication identified on active medication list. I have reviewed chart for other potential  high risk medication/s and harmful drug interactions in the elderly.      Aspirin is not on active medication list.  Aspirin use is not indicated based on review of current medical condition/s. Risk of harm outweighs potential benefits.  .    Patient Active Problem List   Diagnosis    Coronary artery disease involving native coronary artery of native heart without angina pectoris    Hyperlipemia    Essential hypertension    Paroxysmal atrial fibrillation    Pedal edema    Stage 3a chronic kidney disease    Anemia    RAUL on CPAP    Type 2 diabetes mellitus with hyperglycemia, without long-term current use of insulin    Class 1 obesity    OA (osteoarthritis) of hip    Postoperative anemia due to acute blood loss    Dizziness    Screening for malignant neoplasm of colon    Family history of colon cancer     Advance Care Planning Advance Directive is not on file.  ACP discussion was held with the patient during this visit. Patient has an advance directive (not in EMR), copy requested.            Objective   Vitals:    12/10/24 1048   BP: 122/68   BP Location: Right arm   Patient Position: Sitting   Cuff Size: Large Adult   Pulse: 65   Resp: 16  "  Temp: 97.4 °F (36.3 °C)   TempSrc: Temporal   SpO2: 96%   Weight: 119 kg (262 lb)   Height: 182.9 cm (72.01\")   PainSc: 0-No pain       Estimated body mass index is 35.53 kg/m² as calculated from the following:    Height as of this encounter: 182.9 cm (72.01\").    Weight as of this encounter: 119 kg (262 lb).            Does the patient have evidence of cognitive impairment? No  Lab Results   Component Value Date    TRIG 82 10/14/2024    HDL 41 10/14/2024    LDL 60 10/14/2024    VLDL 16 10/14/2024    HGBA1C 6.00 (H) 10/14/2024                                                                                                Health  Risk Assessment    Smoking Status:  Social History     Tobacco Use   Smoking Status Never   Smokeless Tobacco Never     Alcohol Consumption:  Social History     Substance and Sexual Activity   Alcohol Use Yes    Comment: OCC       Fall Risk Screen  STEADI Fall Risk Assessment was completed, and patient is at MODERATE risk for falls. Assessment completed on:12/10/2024    Depression Screening   Little interest or pleasure in doing things? Not at all   Feeling down, depressed, or hopeless? Not at all   PHQ-2 Total Score 0      Health Habits and Functional and Cognitive Screenin/10/2024    10:00 AM   Functional & Cognitive Status   Do you have difficulty preparing food and eating? No   Do you have difficulty bathing yourself, getting dressed or grooming yourself? No   Do you have difficulty using the toilet? No   Do you have difficulty moving around from place to place? No   Do you have trouble with steps or getting out of a bed or a chair? No   Current Diet Well Balanced Diet   Dental Exam Up to date   Eye Exam Up to date   Exercise (times per week) 0 times per week   Current Exercises Include No Regular Exercise   Do you need help using the phone?  No   Are you deaf or do you have serious difficulty hearing?  Yes   Do you need help to go to places out of walking distance? No   Do " you need help shopping? No   Do you need help preparing meals?  No   Do you need help with housework?  No   Do you need help with laundry? No   Do you need help taking your medications? No   Do you need help managing money? No   Do you ever drive or ride in a car without wearing a seat belt? No   Have you felt unusual stress, anger or loneliness in the last month? No   Who do you live with? Spouse   If you need help, do you have trouble finding someone available to you? No   Have you been bothered in the last four weeks by sexual problems? No   Do you have difficulty concentrating, remembering or making decisions? No           Age-appropriate Screening Schedule:  Refer to the list below for future screening recommendations based on patient's age, sex and/or medical conditions. Orders for these recommended tests are listed in the plan section. The patient has been provided with a written plan.    Health Maintenance List  Health Maintenance   Topic Date Due    HEPATITIS C SCREENING  Never done    ZOSTER VACCINE (3 of 3) 08/07/2023    RSV Vaccine - Adults (1 - 1-dose 75+ series) Never done    DIABETIC EYE EXAM  12/30/2023    DIABETIC FOOT EXAM  04/02/2025    HEMOGLOBIN A1C  04/14/2025    BMI FOLLOWUP  04/15/2025    LIPID PANEL  10/14/2025    ANNUAL WELLNESS VISIT  12/10/2025    TDAP/TD VACCINES (3 - Td or Tdap) 04/12/2027    COVID-19 Vaccine  Completed    INFLUENZA VACCINE  Completed    Pneumococcal Vaccine 65+  Completed    URINE MICROALBUMIN  Discontinued    COLORECTAL CANCER SCREENING  Discontinued                                                                                                                                                CMS Preventative Services Quick Reference  Risk Factors Identified During Encounter  None Identified    The above risks/problems have been discussed with the patient.  Pertinent information has been shared with the patient in the After Visit Summary.  An After Visit Summary and  "PPPS were made available to the patient.    Follow Up:   Next Medicare Wellness visit to be scheduled in 1 year.         Additional E&M Note during same encounter follows:  Patient has additional, significant, and separately identifiable condition(s)/problem(s) that require work above and beyond the Medicare Wellness Visit     Chief Complaint  Medicare Wellness-subsequent (Wanting Rx for compression socks) and Leg Swelling    Subjective   HPI  Toñito is also being seen today for additional medical problem/s.        Fall:   Fell in his barn while he was doing some work. States he tripped on the piece of equipment.     Leg swelling:   Chronic. Ongoing for several years.   He would like to have some knee high compression stockings.     Objective   Vital Signs:  /68 (BP Location: Right arm, Patient Position: Sitting, Cuff Size: Large Adult)   Pulse 65   Temp 97.4 °F (36.3 °C) (Temporal)   Resp 16   Ht 182.9 cm (72.01\")   Wt 119 kg (262 lb)   SpO2 96%   BMI 35.53 kg/m²   Physical Exam  Vitals reviewed.   Constitutional:       Appearance: Normal appearance.   HENT:      Head: Normocephalic and atraumatic.      Nose: Nose normal.   Eyes:      Extraocular Movements: Extraocular movements intact.      Conjunctiva/sclera: Conjunctivae normal.   Cardiovascular:      Rate and Rhythm: Normal rate and regular rhythm.      Pulses: Normal pulses.      Heart sounds: Normal heart sounds.   Pulmonary:      Effort: Pulmonary effort is normal. No respiratory distress.      Breath sounds: Normal breath sounds.   Musculoskeletal:         General: Swelling (bilateral LE, knees and down.) present. Normal range of motion.   Skin:     General: Skin is warm and dry.      Comments: Flaccid bullae noted over anterior aspect of left leg, with surrounding erythema and swelling    Neurological:      General: No focal deficit present.      Mental Status: He is alert and oriented to person, place, and time.      Cranial Nerves: No cranial " nerve deficit.   Psychiatric:         Mood and Affect: Mood normal.         Behavior: Behavior normal.         Thought Content: Thought content normal.         The following data was reviewed by: Belinda Bocanegra MD on 12/10/2024:    Common labs          5/14/2024    12:14 10/14/2024    10:31 12/6/2024    12:08   Common Labs   Glucose 115  105  116    BUN 33  40  44    Creatinine 1.41  2.16  1.90    Sodium 137  137  137    Potassium 4.5  5.1  5.1    Chloride 103  103  103    Calcium 9.9  9.5  9.6    Albumin 4.4  4.4  4.2    Total Bilirubin 0.2  0.3     Alkaline Phosphatase 63  60     AST (SGOT) 22  26     ALT (SGPT) 15  20     WBC 7.69  7.82     Hemoglobin 11.6  11.2     Hematocrit 35.3  32.8     Platelets 295  277     Total Cholesterol 120  117     Triglycerides 67  82     HDL Cholesterol 49  41     LDL Cholesterol  57  60     Hemoglobin A1C 6.10  6.00     Microalbumin, Urine  3.7     PSA  2.000       TSH          5/14/2024    12:14 10/14/2024    10:31   TSH   TSH 2.320  3.070      PSA          10/14/2024    10:31   PSA   PSA 2.000               Assessment and Plan   Diagnoses and all orders for this visit:    1. Medicare annual wellness visit, subsequent (Primary)  At baseline state of health. Acute and chronic needs also addressed below.     2. Leg swelling  Comments:  chronic, bilaterally with left worse than right. Likely due to lymphedema vs chronic venous statis.  echo ordered to evaluate for possible CHF. This was ordered back in 2022 but never scheduled. Repeat order placed. pt to call if he is unable to schedule echo. Referring to lymphedema clinic.   Orders:  -     Adult Transthoracic Echo Complete W/ Cont if Necessary Per Protocol  -     Cancel: Ambulatory Referral to Lymphedema Clinic  -     Ambulatory Referral to Occupational Therapy for Evaluation & Treatment    3. Paroxysmal atrial fibrillation  Chronic, stable. Follows w/ cardiology.  -     Adult Transthoracic Echo Complete W/ Cont if Necessary Per  Protocol    4. Stage 3b chronic kidney disease  Chronic, stable.     5. Injury of left lower extremity, initial encounter  Comments:  with flacid bullae over anterior left leg concerning for possible cellulitis.  Keflex sent in w/ plan for close f/u.     6. Leg swelling  -     Adult Transthoracic Echo Complete W/ Cont if Necessary Per Protocol  -     Cancel: Ambulatory Referral to Lymphedema Clinic  -     Ambulatory Referral to Occupational Therapy for Evaluation & Treatment    7. Immunization counseling  Due for RSV vaccines. Counseling provided.     Other orders  -     Discontinue: cephalexin (Keflex) 500 MG capsule; Take 1 capsule by mouth 3 (Three) Times a Day for 7 days.  Dispense: 21 capsule; Refill: 0  -     cephalexin (Keflex) 500 MG capsule; Take 1 capsule by mouth 3 (Three) Times a Day for 7 days.  Dispense: 21 capsule; Refill: 0                    Follow Up   Return in about 2 weeks (around 12/24/2024).  Patient was given instructions and counseling regarding his condition or for health maintenance advice. Please see specific information pulled into the AVS if appropriate.

## 2024-12-11 DIAGNOSIS — N18.31 STAGE 3A CHRONIC KIDNEY DISEASE: ICD-10-CM

## 2024-12-12 RX ORDER — SPIRONOLACTONE 25 MG/1
TABLET ORAL
Qty: 45 TABLET | Refills: 1 | Status: SHIPPED | OUTPATIENT
Start: 2024-12-12

## 2024-12-12 NOTE — THERAPY EVALUATION
Diagnostic/review    Summary  ID: [id number]  Patient: Jovani Burgess [[id number]]     Procedure: SERVICE TO SURGERY GENERAL Status: Needs Scheduling   Requested appt date:  Authorizing: Katharine Berkowitz MD in [facility]   Referral: [id number]   (Authorized)       Priority: Routine               Diagnosis: Positive colorectal cancer screening using Cologuard test [R19.5]       Request History    Action Date and Time User Details   Request Created 12/12/2024 10:31 Emelyn Covington MA Appointment Encounter: Details       Comments    Positive Cologuard.      Patient Name: Toñito Kuo  : 1950    MRN: [id number]                              Today's Date: 10/4/2022       Admit Date: 10/3/2022    Visit Dx:     ICD-10-CM ICD-9-CM   1. Difficulty in walking  R26.2 719.7   2. Primary osteoarthritis of right hip  M16.11 715.15   3. Decreased activities of daily living (ADL)  Z78.9 V49.89     Patient Active Problem List   Diagnosis   • Atherosclerosis of coronary artery bypass graft of native heart without angina pectoris   • Hyperlipemia   • Hypertension   • Paroxysmal atrial fibrillation (HCC)   • Pedal edema   • Stage 3a chronic kidney disease (HCC)   • Anemia   • RAUL on CPAP   • Type 2 diabetes mellitus with hyperglycemia, without long-term current use of insulin (HCC)   • Class 1 obesity   • OA (osteoarthritis) of hip   • Primary localized osteoarthrosis of the hip, left     Past Medical History:   Diagnosis Date   • Bladder disease    • CAD (coronary artery disease) 2016    hx    • Cancer (HCC)     SKIN CANCER BASAL LEFT EYE AND EAR  AND SQAMOUS CELL RIGHT SIDE TORSO   • Chest pain     DENIES ANY RECENT CP/SOA. FOLLOWED BY DR MARCELINO AND TRANSITION TO DR WHIPPLE. REPORTS HAS 35 ACRES OF LAND AND WORKS ON IT   • Coronary atherosclerosis of native coronary vessel 2015   • Diabetes mellitus (HCC)     type 2. DOES NOT CHECK BS DAILY   • GERD (gastroesophageal reflux disease)    • Hard of hearing     HEARS BETTER OUT OF RIGHT EAR. BILATERAL HEARING AID   • Heart attack (HCC)    • HTN (hypertension)    • Hyperlipemia    • Osteoarthritis     BILATERAL HIPS   • Paroxysmal atrial fibrillation (HCC) 2021   • Seasonal allergies    • Sleep apnea     over 10 yrs ago   • Stage 3a chronic kidney disease (HCC) 2021     Past Surgical History:   Procedure Laterality Date   • APPENDECTOMY         • COLONOSCOPY      diverticulosis   • CORONARY ARTERY BYPASS GRAFT      2011   • HERNIA REPAIR     • TESTICLE SURGERY     • TOTAL HIP ARTHROPLASTY Right  10/3/2022    Procedure: TOTAL HIP ARTHROPLASTY ANTERIOR RIGHT;  Surgeon: Ravinder Hawley MD;  Location: [facility] MAIN OR;  Service: Orthopedics;  Laterality: Right;      General Information     Row Name 10/04/22 0912 10/04/22 0906       OT Time and Intention    Document Type therapy note (daily note)  -PG evaluation  -PG    Mode of Treatment individual therapy;occupational therapy  -PG individual therapy;occupational therapy  -PG    Row Name 10/04/22 0906          General Information    Patient Profile Reviewed yes  -PG     Prior Level of Function independent:;ADL's;transfer  -PG     Existing Precautions/Restrictions fall  -PG     Barriers to Rehab none identified  -PG     Row Name 10/04/22 0906          Occupational Profile    Reason for Services/Referral (Occupational Profile) Patient is a pleasant 72-year-old male admitted for an elective left total hip replacement.  No previous OT services identified.  Patient is being evaluated to assess ADL status and determine appropriate discharge needs.  -PG     Row Name 10/04/22 0906          Living Environment    People in Home spouse  -PG     Row Name 10/04/22 0906          Cognition    Orientation Status (Cognition) oriented x 4  -PG     Row Name 10/04/22 0906          Safety Issues, Functional Mobility    Impairments Affecting Function (Mobility) balance;pain;strength  -PG           User Key  (r) = Recorded By, (t) = Taken By, (c) = Cosigned By    Initials Name Provider Type    PG Rick Kimbrough OT Occupational Therapist                 Mobility/ADL's     Row Name 10/04/22 0912 10/04/22 0907       Transfers    Transfers bed-chair transfer;sit-stand transfer  -PG --    Bed-Chair [place] (Transfers) -- contact guard;verbal cues  -PG    Assistive Device (Bed-Chair Transfers) walker, 4-wheeled  -PG walker, 4-wheeled  -PG    Sit-Stand [place] (Transfers) contact guard  -PG --    Row Name 10/04/22 0907          Activities of Daily Living    BADL  Assessment/Intervention bathing;upper body dressing;lower body dressing;toileting;grooming  -PG     Row Name 10/04/22 0912 10/04/22 0907       Bathing Assessment/Intervention    [place] Level (Bathing) bathing skills  -PG bathing skills  -PG    Row Name 10/04/22 0912 10/04/22 0907       Upper Body Dressing Assessment/Training    [place] Level (Upper Body Dressing) upper body dressing skills;set up  -PG upper body dressing skills;set up  -PG    Row Name 10/04/22 0912 10/04/22 0907       Lower Body Dressing Assessment/Training    [place] Level (Lower Body Dressing) lower body dressing skills;minimum assist (75% patient effort)  -PG lower body dressing skills;minimum assist (75% patient effort)  -PG    Position (Lower Body Dressing) supported standing  -PG supported standing  -PG    Row Name 10/04/22 0912 10/04/22 0907       Toileting Assessment/Training    [place] Level (Toileting) toileting skills;contact guard assist  -PG toileting skills;contact guard assist  -PG    [facility] Name 10/04/22 0912 10/04/22 0907       Grooming Assessment/Training    [place] Level (Grooming) grooming skills;set up  -PG grooming skills;set up  -PG          User Key  (r) = Recorded By, (t) = Taken By, (c) = Cosigned By    Initials Name Provider Type    PG Rick Kimbrough OT Occupational Therapist               Obj/Interventions     Row Name 10/04/22 0909          Sensory Assessment (Somatosensory)    Sensory Assessment (Somatosensory) sensation intact  -PG     Row Name 10/04/22 0909          Vision Assessment/Intervention    Visual Impairment/Limitations WFL  -PG     Row Name 10/04/22 0909          Range of Motion Comprehensive    General Range of Motion no range of motion deficits identified  -PG     Row Name 10/04/22 0909          Strength Comprehensive (MMT)    General Manual Muscle Testing (MMT) Assessment no strength deficits identified  -PG     Row Name 10/04/22 0909          Motor Skills    Motor Skills  coordination;functional endurance  -PG     Coordination WFL  -PG     Functional Endurance Good minus  -PG           User Key  (r) = Recorded By, (t) = Taken By, (c) = Cosigned By    Initials Name Provider Type    PG Rick Kimbrough OT Occupational Therapist               Goals/Plan     Row Name 10/04/22 0910          Transfer Goal 1 (OT)    Activity/Assistive Device (Transfer Goal 1, OT) transfers, all  -PG     [place] Level/Cues Needed (Transfer Goal 1, OT) modified independence  -PG     Time Frame (Transfer Goal 1, OT) long term goal (LTG);10 days  -PG     Row Name 10/04/22 0910          Bathing Goal 1 (OT)    Activity/Device (Bathing Goal 1, OT) bathing skills, all  -PG     [place] Level/Cues Needed (Bathing Goal 1, OT) modified independence  -PG     Time Frame (Bathing Goal 1, OT) long term goal (LTG);10 days  -PG     Row Name 10/04/22 0910          Dressing Goal 1 (OT)    Activity/Device (Dressing Goal 1, OT) dressing skills, all  -PG     [place]/Cues Needed (Dressing Goal 1, OT) modified independence  -PG     Time Frame (Dressing Goal 1, OT) long term goal (LTG);10 days  -PG     Row Name 10/04/22 0910          Toileting Goal 1 (OT)    Activity/Device (Toileting Goal 1, OT) toileting skills, all  -PG     [place] Level/Cues Needed (Toileting Goal 1, OT) modified independence  -PG     Time Frame (Toileting Goal 1, OT) long term goal (LTG);10 days  -PG     Row Name 10/04/22 0910          Grooming Goal 1 (OT)    Activity/Device (Grooming Goal 1, OT) grooming skills, all  -PG     [place] (Grooming Goal 1, OT) modified independence  -PG     Time Frame (Grooming Goal 1, OT) long term goal (LTG);10 days  -PG     Row Name 10/04/22 0910          Therapy Assessment/Plan (OT)    Planned Therapy Interventions (OT) BADL retraining;transfer/mobility retraining;strengthening exercise;patient/caregiver education/training  -PG           User Key  (r) = Recorded By, (t) = Taken By, (c) = Cosigned By     Initials Name Provider Type    PG Rick Kimbrough, OT Occupational Therapist               Clinical Impression     Row Name 10/04/22 0910          Pain Assessment    Pretreatment Pain Rating 0/10 - no pain  -PG     Posttreatment Pain Rating 0/10 - no pain  -PG     Row Name 10/04/22 0910          Plan of Care Review    Plan of Care Reviewed With patient  -PG     Progress no change  -PG     Outcome Evaluation Patient presents with limitations affecting strength, activity tolerance, and balance impacting patient's ability to return home safely and independently.  The skills of a therapist will be required to safely and effectively implement the following treatment plan to restore maximal level of function  -PG     Row Name 10/04/22 0910          Therapy Assessment/Plan (OT)    Patient/Family Therapy Goal Statement (OT) Patient would like to return home independently and walk without pain  -PG     Rehab Potential (OT) good, to achieve stated therapy goals  -PG     Criteria for Skilled Therapeutic Interventions Met (OT) yes;meets criteria;skilled treatment is necessary  -PG     Therapy Frequency (OT) 5 times/wk  -PG     Row Name 10/04/22 0910          Therapy Plan Review/Discharge Plan (OT)    Anticipated Discharge Disposition (OT) home with outpatient therapy services  -PG           User Key  (r) = Recorded By, (t) = Taken By, (c) = Cosigned By    Initials Name Provider Type    PG Rick Kimbrough OT Occupational Therapist               Outcome Measures     Row Name 10/04/22 0911          How much help from another is currently needed...    Putting on and taking off regular lower body clothing? 3  -PG     Bathing (including washing, rinsing, and drying) 3  -PG     Toileting (which includes using toilet bed pan or urinal) 3  -PG     Putting on and taking off regular upper body clothing 3  -PG     Taking care of personal grooming (such as brushing teeth) 3  -PG     Eating meals 4  -PG     AM-PAC 6 Clicks Score (OT) 19  -PG      Row Name 10/04/22 0820 10/03/22 2116       How much help from another person do you currently need...    Turning from your back to your side while in flat bed without using bedrails? 3  -CG 3  -SV    Moving from lying on back to sitting on the side of a flat bed without bedrails? 3  -CG 3  -SV    Moving to and from a bed to a chair (including a wheelchair)? 3  -CG 3  -SV    Standing up from a chair using your arms (e.g., wheelchair, bedside chair)? 3  -CG 3  -SV    Climbing 3-5 steps with a railing? 2  -CG 2  -SV    To walk in hospital room? 3  -CG 3  -SV    AM-PAC 6 Clicks Score (PT) 17  -CG 17  -SV    Highest level of mobility 5 --> Static standing  -CG 5 --> Static standing  -SV    Row Name 10/04/22 0911          Functional Assessment    Outcome Measure Options AM-PAC 6 Clicks Daily Activity (OT);Optimal Instrument  -PG     Row Name 10/04/22 0911          Optimal Instrument    Optimal Instrument Optimal - 3  -PG     Bending/Stooping 2  -PG     Standing 2  -PG     Reaching 1  -PG     From the list, choose the 3 activities you would most like to be able to do without any difficulty Bending/stooping;Reaching;Standing  -PG     Total Score Optimal - 3 5  -PG           User Key  (r) = Recorded By, (t) = Taken By, (c) = Cosigned By    Initials Name Provider Type    Primitivo Daugherty, RN Registered Nurse    Eveline Tate, RN Registered Nurse    Rick Victoria OT Occupational Therapist                Occupational Therapy Education                 Title: PT OT SLP Therapies (Done)     Topic: Occupational Therapy (Done)     Point: ADL training (Done)     Description:   Instruct learner(s) on proper safety adaptation and remediation techniques during self care or transfers.   Instruct in proper use of assistive devices.              Learning Progress Summary           Patient Acceptance, D,E, DU by PG at 10/4/2022 0912                   Point: Home exercise program (Done)     Description:   Instruct learner(s)  on appropriate technique for monitoring, assisting and/or progressing therapeutic exercises/activities.              Learning Progress Summary           Patient Acceptance, D,E, DU by PG at 10/4/2022 0912                   Point: Precautions (Done)     Description:   Instruct learner(s) on prescribed precautions during self-care and functional transfers.              Learning Progress Summary           Patient Acceptance, D,E, DU by PG at 10/4/2022 0912                   Point: Body mechanics (Done)     Description:   Instruct learner(s) on proper positioning and spine alignment during self-care, functional mobility activities and/or exercises.              Learning Progress Summary           Patient Acceptance, D,E, DU by PG at 10/4/2022 0912                               User Key     Initials Effective Dates Name Provider Type Discipline    PG 06/16/21 -  Rick Kimbrough, OT Occupational Therapist OT              OT Recommendation and Plan  Planned Therapy Interventions (OT): BADL retraining, transfer/mobility retraining, strengthening exercise, patient/caregiver education/training  Therapy Frequency (OT): 5 times/wk  Plan of Care Review  Plan of Care Reviewed With: patient  Progress: no change  Outcome Evaluation: Patient presents with limitations affecting strength, activity tolerance, and balance impacting patient's ability to return home safely and independently.  The skills of a therapist will be required to safely and effectively implement the following treatment plan to restore maximal level of function     Time Calculation:    Time Calculation- OT     Row Name 10/04/22 0913             Time Calculation- OT    OT Received On 10/04/22  -PG      OT Goal Re-Cert Due Date 10/13/22  -PG              Timed Charges    88839 - OT Therapeutic Activity Minutes 10  -PG      30931 - OT Self Care/Mgmt Minutes 15  -PG              Untimed Charges    OT Eval/Re-eval Minutes 20  -PG              Total Minutes    Timed  Charges Total Minutes 25  -PG      Untimed Charges Total Minutes 20  -PG       Total Minutes 45  -PG            User Key  (r) = Recorded By, (t) = Taken By, (c) = Cosigned By    Initials Name Provider Type    PG Rick Kimbrough OT Occupational Therapist              Therapy Charges for Today     Code Description Service Date Service Provider Modifiers Qty    [id number]  OT THERAPEUTIC ACT EA 15 MIN 10/4/2022 Rick Kimbrough OT GO 1    [id number]  OT SELF CARE/MGMT/TRAIN EA 15 MIN 10/4/2022 Rick Kimbrough OT GO 1    [id number]  OT EVAL LOW COMPLEXITY 2 10/4/2022 Rick Kimbrough OT GO 1               Rick Kmibrough OT  10/4/2022

## 2024-12-16 ENCOUNTER — TELEPHONE (OUTPATIENT)
Dept: ORTHOPEDIC SURGERY | Facility: CLINIC | Age: 74
End: 2024-12-16
Payer: MEDICARE

## 2024-12-16 DIAGNOSIS — M25.551 RIGHT HIP PAIN: ICD-10-CM

## 2024-12-16 DIAGNOSIS — M24.551 RIGHT HIP FLEXOR TIGHTNESS: ICD-10-CM

## 2024-12-16 NOTE — TELEPHONE ENCOUNTER
Left voicemail for patient informing him we need to clarify if/why his PCP stopped this medication before we can refill.

## 2024-12-16 NOTE — TELEPHONE ENCOUNTER
It appears medication was recently discontinued by PCP (on 12/10).  Can we clarify before I refill, please?

## 2024-12-17 RX ORDER — DICLOFENAC SODIUM 75 MG/1
75 TABLET, DELAYED RELEASE ORAL 2 TIMES DAILY PRN
Qty: 180 TABLET | Refills: 1 | Status: SHIPPED | OUTPATIENT
Start: 2024-12-17

## 2024-12-17 NOTE — TELEPHONE ENCOUNTER
Spoke with pt, he states that his PCP must have discontinued by mistake, he would like a refill of Diclofenac.

## 2024-12-17 NOTE — TELEPHONE ENCOUNTER
PATIENTS WIFE CALLED BACK AND IS REQUESTING TO SPEAK ONLY TO [facility] REGARDING REFILL.   SHE WOULD LIKE A CALL BACK AT [phone].

## 2024-12-23 ENCOUNTER — HOSPITAL ENCOUNTER (OUTPATIENT)
Dept: CARDIOLOGY | Facility: HOSPITAL | Age: 74
Discharge: HOME | End: 2024-12-23
Admitting: STUDENT IN AN ORGANIZED HEALTH CARE EDUCATION/TRAINING PROGRAM
Payer: MEDICARE

## 2024-12-23 LAB
AORTIC DIMENSIONLESS INDEX: 0.78 (DI)
ASCENDING AORTA: 3.5 CM
AV MEAN PRESS GRAD SYS DOP V1V2: 3 MMHG
BH CV ECHO MEAS - AO ROOT DIAM: 3.4 CM
BH CV ECHO MEAS - AO V2 VTI: 34.9 CM
BH CV ECHO MEAS - AVA(I,D): 3.2 CM2
BH CV ECHO MEAS - EDV(CUBED): 196.1 ML
BH CV ECHO MEAS - EDV(MOD-SP2): 116 ML
BH CV ECHO MEAS - EDV(MOD-SP4): 111 ML
BH CV ECHO MEAS - EF(MOD-SP2): 60.5 %
BH CV ECHO MEAS - EF(MOD-SP4): 56.8 %
BH CV ECHO MEAS - ESV(CUBED): 54.9 ML
BH CV ECHO MEAS - ESV(MOD-SP2): 45.8 ML
BH CV ECHO MEAS - ESV(MOD-SP4): 48 ML
BH CV ECHO MEAS - FS: 34.6 %
BH CV ECHO MEAS - IVS/LVPW: 0.87 CM
BH CV ECHO MEAS - IVSD: 1.22 CM
BH CV ECHO MEAS - LA DIMENSION: 4.9 CM
BH CV ECHO MEAS - LAT PEAK E' VEL: 9.7 CM/SEC
BH CV ECHO MEAS - LV DIASTOLIC VOL/BSA (35-75): 46.5 CM2
BH CV ECHO MEAS - LV MASS(C)D: 337.6 GRAMS
BH CV ECHO MEAS - LV MAX PG: 5.6 MMHG
BH CV ECHO MEAS - LV MEAN PG: 3 MMHG
BH CV ECHO MEAS - LV SYSTOLIC VOL/BSA (12-30): 20.1 CM2
BH CV ECHO MEAS - LV V1 MAX: 118 CM/SEC
BH CV ECHO MEAS - LV V1 VTI: 27.3 CM
BH CV ECHO MEAS - LVIDD: 5.8 CM
BH CV ECHO MEAS - LVIDS: 3.8 CM
BH CV ECHO MEAS - LVOT AREA: 4.2 CM2
BH CV ECHO MEAS - LVOT DIAM: 2.3 CM
BH CV ECHO MEAS - LVPWD: 1.41 CM
BH CV ECHO MEAS - MED PEAK E' VEL: 6.7 CM/SEC
BH CV ECHO MEAS - MV A MAX VEL: 71.7 CM/SEC
BH CV ECHO MEAS - MV DEC TIME: 0.15 SEC
BH CV ECHO MEAS - MV E MAX VEL: 85.2 CM/SEC
BH CV ECHO MEAS - MV E/A: 1.19
BH CV ECHO MEAS - PA V2 MAX: 90.4 CM/SEC
BH CV ECHO MEAS - RVDD: 4.8 CM
BH CV ECHO MEAS - SV(LVOT): 113.4 ML
BH CV ECHO MEAS - SV(MOD-SP2): 70.2 ML
BH CV ECHO MEAS - SV(MOD-SP4): 63 ML
BH CV ECHO MEAS - SVI(LVOT): 47.5 ML/M2
BH CV ECHO MEAS - SVI(MOD-SP2): 29.4 ML/M2
BH CV ECHO MEAS - SVI(MOD-SP4): 26.4 ML/M2
BH CV ECHO MEAS - TAPSE (>1.6): 1.93 CM
BH CV ECHO MEAS - TR MAX PG: 33.4 MMHG
BH CV ECHO MEAS - TR MAX VEL: 289 CM/SEC
BH CV ECHO MEASUREMENTS AVERAGE E/E' RATIO: 10.39
BH CV XLRA - TDI S': 11.8 CM/SEC
IVRT: 76 MS
LEFT ATRIUM VOLUME INDEX: 27.3 ML/M2
LV EF BIPLANE MOD: 57.8 %

## 2024-12-23 PROCEDURE — 93306 TTE W/DOPPLER COMPLETE: CPT

## 2024-12-26 ENCOUNTER — OFFICE VISIT (OUTPATIENT)
Dept: INTERNAL MEDICINE | Facility: CLINIC | Age: 74
End: 2024-12-26
Payer: MEDICARE

## 2024-12-26 VITALS
TEMPERATURE: 97 F | OXYGEN SATURATION: 97 % | RESPIRATION RATE: 16 BRPM | BODY MASS INDEX: 35.53 KG/M2 | SYSTOLIC BLOOD PRESSURE: 126 MMHG | WEIGHT: 262 LBS | HEART RATE: 69 BPM | DIASTOLIC BLOOD PRESSURE: 72 MMHG

## 2024-12-26 DIAGNOSIS — M79.89 LEFT LEG SWELLING: Primary | ICD-10-CM

## 2024-12-26 DIAGNOSIS — I48.0 PAROXYSMAL ATRIAL FIBRILLATION: Chronic | ICD-10-CM

## 2024-12-26 DIAGNOSIS — L03.116 LEFT LEG CELLULITIS: ICD-10-CM

## 2024-12-26 PROCEDURE — 3074F SYST BP LT 130 MM HG: CPT | Performed by: STUDENT IN AN ORGANIZED HEALTH CARE EDUCATION/TRAINING PROGRAM

## 2024-12-26 PROCEDURE — 99214 OFFICE O/P EST MOD 30 MIN: CPT | Performed by: STUDENT IN AN ORGANIZED HEALTH CARE EDUCATION/TRAINING PROGRAM

## 2024-12-26 PROCEDURE — 1126F AMNT PAIN NOTED NONE PRSNT: CPT | Performed by: STUDENT IN AN ORGANIZED HEALTH CARE EDUCATION/TRAINING PROGRAM

## 2024-12-26 PROCEDURE — 3078F DIAST BP <80 MM HG: CPT | Performed by: STUDENT IN AN ORGANIZED HEALTH CARE EDUCATION/TRAINING PROGRAM

## 2024-12-26 RX ORDER — CEPHALEXIN 500 MG/1
500 CAPSULE ORAL 3 TIMES DAILY
Qty: 21 CAPSULE | Refills: 0 | Status: SHIPPED | OUTPATIENT
Start: 2024-12-26 | End: 2025-01-02

## 2024-12-26 NOTE — PROGRESS NOTES
Chief Complaint  Leg Swelling (Left leg swelling )    Subjective            Toñito Kuo presents to [facility]  History of Present Illness    Left leg swelling:   Improved since last visit. Is wearing compression stockings w/ improvement.   Pt was started on keflex for concern for cellulitis at last visit. States swollen area is improved but not resolved. No fevers. NO drainage from LLE.      Past Medical History:   Diagnosis Date    Bladder disease     CAD (coronary artery disease) 11/09/2016    hx     Cancer     SKIN CANCER BASAL LEFT EYE AND EAR  AND SQAMOUS CELL RIGHT SIDE TORSO    Chest pain     DENIES ANY RECENT CP/SOA. FOLLOWED BY DR MARCELINO AND TRANSITION TO DR WHIPPLE. REPORTS HAS 35 ACRES OF LAND AND WORKS ON IT    Coronary atherosclerosis of native coronary vessel 03/29/2015    Diabetes mellitus     type 2. DOES NOT CHECK BS DAILY    GERD (gastroesophageal reflux disease)     Hard of hearing     HEARS BETTER OUT OF LEFT EAR. BILATERAL HEARING AID    Heart attack     HTN (hypertension)     Hyperlipemia     Osteoarthritis     BILATERAL HIPS    Paroxysmal atrial fibrillation 08/11/2021    Seasonal allergies     Sleep apnea     over 10 yrs ago    Stage 3a chronic kidney disease 08/11/2021       Allergies:   Allergies   Allergen Reactions    Penicillins Rash          Past Surgical History:   Procedure Laterality Date    APPENDECTOMY      1957    COLONOSCOPY      diverticulosis    COLONOSCOPY N/A 5/20/2024    Procedure: COLONOSCOPY;  Surgeon: Justin Burris MD;  Location: [facility] ENDOSCOPY;  Service: General;  Laterality: N/A;  DIVERTICULOSIS    CORONARY ARTERY BYPASS GRAFT      2/2011    HERNIA REPAIR      SKIN CANCER EXCISION Right     TESTICLE SURGERY  1974    TOTAL HIP ARTHROPLASTY Right 10/03/2022    Procedure: TOTAL HIP ARTHROPLASTY ANTERIOR RIGHT;  Surgeon: Ravinder Hawley MD;  Location: [facility] MAIN OR;  Service: Orthopedics;  Laterality: Right;           Social History     Socioeconomic History    Marital status:    Tobacco Use    Smoking status: Never    Smokeless tobacco: Never   Vaping Use    Vaping status: Never Used   Substance and Sexual Activity    Alcohol use: Yes     Comment: OCC    Drug use: Never    Sexual activity: Defer         Family History   Problem Relation Age of Onset    Melanoma Mother     Breast cancer Sister     Colon cancer Maternal Grandmother     Diabetes Maternal Grandmother     Lung cancer Maternal Grandfather     Breast cancer Paternal Grandmother     Malig Hyperthermia Neg Hx           Health Maintenance Due   Topic Date Due    HEPATITIS C SCREENING  Never done    ZOSTER VACCINE (3 of 3) 08/07/2023    RSV Vaccine - Adults (1 - 1-dose 75+ series) Never done    DIABETIC EYE EXAM  12/30/2023            Current Outpatient Medications:     diclofenac (VOLTAREN) 75 MG EC tablet, Take 1 tablet by mouth 2 (Two) Times a Day As Needed (PAIN). Take intermittently as discussed, Disp: 180 tablet, Rfl: 1    Diclofenac Sodium (VOLTAREN) 1 % gel gel, Apply 4 g topically to the appropriate area as directed 4 (Four) Times a Day As Needed (pain)., Disp: 150 g, Rfl: 3    Eliquis 5 MG tablet tablet, TAKE 1 TABLET TWICE DAILY, Disp: 180 tablet, Rfl: 3    metFORMIN ER (GLUCOPHAGE-XR) 500 MG 24 hr tablet, TAKE 1 TABLET TWICE DAILY WITH MEALS, Disp: 180 tablet, Rfl: 3    metoprolol succinate XL (TOPROL-XL) 50 MG 24 hr tablet, TAKE 1 TABLET EVERY DAY, Disp: 90 tablet, Rfl: 3    multivitamin with minerals tablet tablet, Take 1 tablet by mouth Daily., Disp: , Rfl:     nitroglycerin (NITROSTAT) 0.4 MG SL tablet, Place 1 tablet under the tongue Every 5 (Five) Minutes As Needed for Chest Pain., Disp: 30 tablet, Rfl: 3    rosuvastatin (CRESTOR) 40 MG tablet, TAKE 1 TABLET EVERY DAY, Disp: 90 tablet, Rfl: 3    sildenafil (Viagra) 50 MG tablet, Take 1 tablet by mouth Daily As Needed for Erectile Dysfunction., Disp: 8 tablet, Rfl: 3    spironolactone  (ALDACTONE) 25 MG tablet, TAKE 1 TABLET EVERY OTHER DAY, Disp: 45 tablet, Rfl: 1    valsartan-hydrochlorothiazide (DIOVAN-HCT) 160-25 MG per tablet, TAKE 1 TABLET EVERY DAY, Disp: 90 tablet, Rfl: 3    cephalexin (Keflex) 500 MG capsule, Take 1 capsule by mouth 3 (Three) Times a Day for 7 days., Disp: 21 capsule, Rfl: 0      Immunization History   Administered Date(s) Administered    COVID-19 (MODERNA) 12YRS+ (SPIKEVAX) 10/30/2023, 10/26/2024    COVID-19 (MODERNA) 1st,2nd,3rd Dose Monovalent 01/08/2021, 02/05/2021, 11/10/2021    COVID-19 (MODERNA) BIVALENT 12+YRS 11/09/2022    COVID-19 (MODERNA) Monovalent Original Booster 04/27/2022    Fluad Quad 65+ 10/30/2023    Fluzone High-Dose 65+YRS 10/26/2024    Fluzone High-Dose 65+yrs 10/13/2022    Fluzone Quad >6mos (Multi-dose) 09/25/2020    Hepatitis A 11/25/2020    Pneumococcal Conjugate 13-Valent (PCV13) 10/20/2015    Pneumococcal Conjugate 20-Valent (PCV20) 06/12/2023    Pneumococcal Polysaccharide (PPSV23) 11/06/2013    Shingrix 06/12/2023    Td (TDVAX) 01/05/2006    Tdap 04/12/2017    Zostavax 05/30/2013         Review of Systems       Objective       Vitals:    12/26/24 1132   BP: 126/72   BP Location: Right arm   Patient Position: Sitting   Cuff Size: Adult   Pulse: 69   Resp: 16   Temp: 97 °F (36.1 °C)   TempSrc: Temporal   SpO2: 97%   Weight: 119 kg (262 lb)       Physical Exam  Vitals reviewed.   Constitutional:       Appearance: Normal appearance.   HENT:      Head: Normocephalic and atraumatic.      Nose: Nose normal.   Eyes:      Extraocular Movements: Extraocular movements intact.      Conjunctiva/sclera: Conjunctivae normal.   Cardiovascular:      Rate and Rhythm: Normal rate and regular rhythm.      Pulses: Normal pulses.      Heart sounds: Normal heart sounds.   Pulmonary:      Effort: Pulmonary effort is normal. No respiratory distress.      Breath sounds: Normal breath sounds.   Musculoskeletal:         General: Swelling (bilaterally with ~2.5cm raised  and swollen area, improved compared to last visit.) present. Normal range of motion.   Skin:     General: Skin is warm and dry.   Neurological:      General: No focal deficit present.      Mental Status: He is alert and oriented to person, place, and time.      Cranial Nerves: No cranial nerve deficit.   Psychiatric:         Mood and Affect: Mood normal.         Behavior: Behavior normal.         Thought Content: Thought content normal.             Result Review :                           Assessment and Plan      Diagnoses and all orders for this visit:    1. Left leg swelling (Primary)  Chronic swelling w/ small area concerning for cellulitis. Extending abx course.  Encouraged pt to continue wearing compression stockings. Pt referred to lymphedema clinic at last visit and is waiting on scheduling.  -     cephalexin (Keflex) 500 MG capsule; Take 1 capsule by mouth 3 (Three) Times a Day for 7 days.  Dispense: 21 capsule; Refill: 0  -     Compression Stockings    2. Left leg cellulitis  Improved but not resolved, extending abx course per above.     3. Paroxysmal atrial fibrillation  Chronic, reviewed recent echo which is  grossly unremarkable.                  Follow Up     No follow-ups on file.    Patient was given instructions and counseling regarding his condition or for health maintenance advice. Please see specific information pulled into the AVS if appropriate.     Belinda Bocanegra MD   Internal Medicine-Pediatrics

## 2025-01-02 ENCOUNTER — HOSPITAL ENCOUNTER (OUTPATIENT)
Dept: OCCUPATIONAL THERAPY | Facility: HOSPITAL | Age: 75
Setting detail: THERAPIES SERIES
Discharge: HOME | End: 2025-01-02
Payer: MEDICARE

## 2025-01-02 DIAGNOSIS — I25.10 CORONARY ARTERY DISEASE INVOLVING NATIVE CORONARY ARTERY OF NATIVE HEART WITHOUT ANGINA PECTORIS: ICD-10-CM

## 2025-01-02 DIAGNOSIS — N18.31 CHRONIC KIDNEY DISEASE (CKD) STAGE G3A/A1, MODERATELY DECREASED GLOMERULAR FILTRATION RATE (GFR) BETWEEN 45-59 ML/MIN/1.73 SQUARE METER AND ALBUMINURIA CREATININE RATIO LESS THAN 30 MG/G (CMS/H*: ICD-10-CM

## 2025-01-02 DIAGNOSIS — E13.9 DIABETES 1.5, MANAGED AS TYPE 2: ICD-10-CM

## 2025-01-02 DIAGNOSIS — I89.0 LYMPHEDEMA: Primary | ICD-10-CM

## 2025-01-02 PROCEDURE — 97166 OT EVAL MOD COMPLEX 45 MIN: CPT

## 2025-01-02 PROCEDURE — 97535 SELF CARE MNGMENT TRAINING: CPT

## 2025-01-02 NOTE — THERAPY RE-EVALUATION
Outpatient Occupational Therapy Lymphedema Re-Evaluation  KHANG Chaparro     Patient Name: Toñito Kuo  : 1950  MRN: [id number]  Today's Date: 2025      Visit Date: 2025    Patient Active Problem List   Diagnosis    Coronary artery disease involving native coronary artery of native heart without angina pectoris    Hyperlipemia    Essential hypertension    Paroxysmal atrial fibrillation    Pedal edema    Stage 3a chronic kidney disease    Anemia    RAUL on CPAP    Type 2 diabetes mellitus with hyperglycemia, without long-term current use of insulin    Class 1 obesity    OA (osteoarthritis) of hip    Postoperative anemia due to acute blood loss    Dizziness    Screening for malignant neoplasm of colon    Family history of colon cancer        Past Medical History:   Diagnosis Date    Bladder disease     CAD (coronary artery disease) 2016    hx     Cancer     SKIN CANCER BASAL LEFT EYE AND EAR  AND SQAMOUS CELL RIGHT SIDE TORSO    Chest pain     DENIES ANY RECENT CP/SOA. FOLLOWED BY DR MARCELINO AND TRANSITION TO DR WHIPPLE. REPORTS HAS 35 [address]CR[address] OF LAND AND WORKS ON IT    Coronary atherosclerosis of native coronary vessel 2015    Diabetes mellitus     type 2. DOES NOT CHECK BS DAILY    GERD (gastroesophageal reflux disease)     Hard of hearing     HEARS BETTER OUT OF LEFT EAR. BILATERAL HEARING AID    Heart attack     HTN (hypertension)     Hyperlipemia     Osteoarthritis     BILATERAL HIPS    Paroxysmal atrial fibrillation 2021    Seasonal allergies     Sleep apnea     over 10 yrs ago    Stage 3a chronic kidney disease 2021        Past Surgical History:   Procedure Laterality Date    APPENDECTOMY          COLONOSCOPY      diverticulosis    COLONOSCOPY N/A 2024    Procedure: COLONOSCOPY;  Surgeon: Justin Burris MD;  Location: [facility] ENDOSCOPY;  Service: General;  Laterality: N/A;  DIVERTICULOSIS    CORONARY ARTERY BYPASS GRAFT      2011    HERNIA REPAIR      SKIN CANCER  EXCISION Right     TESTICLE SURGERY  1974    TOTAL HIP ARTHROPLASTY Right 10/03/2022    Procedure: TOTAL HIP ARTHROPLASTY ANTERIOR RIGHT;  Surgeon: Ravinder Hawley MD;  Location: [facility] MAIN OR;  Service: Orthopedics;  Laterality: Right;         Visit Dx:     ICD-10-CM ICD-9-CM   1. Lymphedema  I89.0 457.1   2. 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 Chronic kidney disease (CKD) stage G3a/A1, moderately decreased glomerular filtration rate (GFR) between 45-59 mL/min/1.73 square meter and albuminuria creatinine ratio less than 30 mg/g (CMS/H*  N18.31 585.3   4. Coronary artery disease involving native coronary artery of native heart without angina pectoris  I25.10 414.01        Patient History       Row Name 01/02/25 1300             History    Chief Complaint Swelling;Pain;Tightness;Tinglings  -MP      Type of Pain Lower Extremity / Leg  -MP      Date Current Problem(s) Began 01/02/19  -MP      Brief Description of Current Complaint Patient reports history of varicose vein concerns for about 15 years with Sclerotherapy about 15 years ago.  He reports that the swelling in his legs of approximately 10 years duration, and it has become more noticable in the last 5 years.  He has tried elevation, and compression. He states he does not exercise regularly.  -MP      Previous treatment for THIS PROBLEM --  compression and elevation  -MP      Patient/Caregiver Goals Decrease swelling  -MP      Current Tobacco Use no  -MP      Smoking Status no  -MP      Patient's Rating of General Health Good  -MP      Hand Dominance ambidextrous  -MP      Occupation/sports/leisure activities Retired  -MP      Patient seeing anyone else for problem(s)? no  -MP      How has patient tried to help current problem? elevation and compression  -MP         Fall Risk Assessment    Any falls in the past year: Yes  -MP      Number of falls reported in the last 12 months 3  -MP      Factors that contributed to the fall: Tripped  -MP       Does patient have a fear of falling No  -MP         Services    Prior Rehab/Home Health Experiences No  -MP      Are you currently receiving Home Health services No  -MP      Do you plan to receive Home Health services in the near future No  -MP         Daily Activities    Primary Language English  -MP      Are you able to read Yes  -MP      Are you able to write Yes  -MP      How does patient learn best? Reading;Demonstration  -MP      Teaching needs identified Home Exercise Program;Management of Condition;Falls Prevention  -MP      Barriers to learning None  -MP      Pt Participated in POC and Goals Yes  -MP         Safety    Are you being hurt, hit, or frightened by anyone at home or in your life? No  -MP      Are you being neglected by a caregiver No  -MP      Have you had any of the following issues with N/A  -MP                User Key  (r) = Recorded By, (t) = Taken By, (c) = Cosigned By      Initials Name Provider Type    Chhaya Cabezas OT Occupational Therapist                     Lymphedema       Row Name 01/02/25 1300             Subjective Pain    Able to rate subjective pain? yes  -MP      Pre-Treatment Pain Level 0  -MP      Post-Treatment Pain Level 0  -MP      Subjective Pain Comment Toñito reports no pain today  -MP         Subjective    Subjective Comments Mr. Kuo is a 74 year old male who lives with his wife in a multi-level home with a basement.  He states he is independent with basic and IADLs.  He is referred to OT by Dr. Bocanegra for swelling in bilateral legs that is of 10+years duration. He has hearing concerns with wearing of hearing aids. He injured the Left shin about 1 month ago and was put on an antibiotic due to seeping of fluid and redness per his report.  -MP         Lymphedema Assessment    Lymphedema Classification RLE:;LLE:;stage 2 (Spontaneously Irreversible)  -MP      Lymphedema Cancer Related Sx --  skin cancer  -MP      Infections or Cellulitis? yes  -MP       "Infection/Cellulitis Treatment antibiotics 1 month ago after injury to the left LE  -MP         LLIS - Physical Concerns    The amount of pain associated with my lymphedema is: 0  -MP      The amount of limb heaviness associated with my lymphedema is: 2  -MP      The amount of skin tightness associated with my lymphedema is: 3  -MP      The size of my swollen limb(s) seems: 3  -MP      Lymphedema affects the movement of my swollen limb(s): 3  -MP      The strength in my swollen limb(s) is: 3  -MP         LLIS - Psychosocial Concerns    Lymphedema affects my body image (i.e., \"how I think I look\"). 2  -MP      Lymphedema affects my socializing with others. 0  -MP      Lymphedema affects my intimate relations with spouse or partner (rate 0 if not applicable 0  -MP      Lymphedema \"gets me down\" (i.e., depression, frustration, or anger) 2  -MP      I must rely on others for help due to my lymphedema. 0  -MP      I know what to do to manage my lymphedema 3  -MP         LLIS - Functional Concerns    Lymphedema affects my ability to perform self-care activities (i.e. eating, dressing, hygiene) 0  -MP      Lymphedema affects my ability to perform routine home or work-related activities. 0  -MP      Lymphedema affects my performance of preferred leisure activities. 3  -MP      Lymphedema affects proper fit of clothing/shoes 1  -MP      Lymphedema affects my sleep 2  -MP         Posture/Observations    Posture- WNL Posture is WNL  -MP         General ROM    GENERAL ROM COMMENTS Hip rotation is limited but he is able to get socks on. He uses a long shoe horn for shoes and gets help with toenails.  -MP         MMT (Manual Muscle Testing)    General MMT Comments Grossly 3+/5 within available ROM  -MP         Lymphedema Edema Assessment    Ptting Edema Category By grade out of 4  -MP      Pitting Edema + 1/4 (+1 of 4)  -MP      Stemmer Sign bilateral:;negative  -MP      [place] Hump bilateral:;negative  -MP      Edema Assessment " Comment Swelling is more evident at the ankles and proximal  -MP         Skin Changes/Observations    Location/Assessment Lower Extremity  -MP      Lower Extremity Conditions bilateral:;intact;clean;hairless;scaly;inflamed  -MP      Lower Extremity Color/Pigment bilateral:;erythema;hyperpigmented;fibrosis  -MP      Lower Extremity Skin Details small varicosing veins at bilateral LEs  -MP         Lymphedema Sensation    Lymphedema Sensation Reports tingling  -MP         Lymphedema Pulses/Capillary Refill    Lymphedema Pulses/Capillary Refill capillary refill  -MP      Capillary Refill lower extremity capillary refill  -MP      Lower Extremity Capillary Refill less than 3 seconds  -MP         Lymphedema Measurements    Measurement Type(s) Circumferential  -MP      Circumferential Areas Lower extremities  -MP      Lymphedema Measurements Comments see attached  -MP         Lymphedema Life Impact Scale Totals    A.  Total Q1 - Q17 (Do not include Q18) 27  -MP      B.  Total number of questions answered (Q1-Q17) 17  -MP      C. Divide A by B 1.59  -MP      D. Multiple C by 25 39.75  -MP                User Key  (r) = Recorded By, (t) = Taken By, (c) = Cosigned By      Initials Name Provider Type    Chahya Cabezas OT Occupational Therapist                  Circumferential Measurements:         Baseline: R: 3267.18 ml L: 3544.75 ml  % volume change: R:--%   L:--%  Note: 1 measurement taken above the knee    Therapy Education  Education Details: Mr. Kuo was educated regarding etiology and treatment available for lymphedema.  He was asked to arrived at next treatment session with clothing to accommodate compression wrapping.  He was also provided with written information regarding lymphedema treatment and was asked to review this prior to next treatment session.  He is agreeable.  Given: HEP, Symptoms/condition management  Program: New  How Provided: Verbal, Written  Provided to: Patient  Level of Understanding:  Verbalized  11144 - OT Self Care/Mgmt Minutes: 13         OT Goals       Row Name 01/02/25 1652          Time Calculation    OT Goal Re-Cert Due Date 02/01/25  -MP               User Key  (r) = Recorded By, (t) = Taken By, (c) = Cosigned By      Initials Name Provider Type    Chhaay Cabezas OT Occupational Therapist                     OT Assessment/Plan       Row Name 01/02/25 1647 01/02/25 1644       OT Assessment    Functional Limitations -- Limitations in community activities;Performance in leisure activities;Other (comment)  -MP    Impairments -- Balance;Edema;Impaired lymphatic circulation;Joint mobility;Locomotion;Pain;Range of motion;Sensation  -MP    Assessment Comments -- Mr. Kuo is a 74-year-old gentleman who is referred to occupational therapy with swelling at bilateral lower extremities of 10+ years duration.  He has a history of varicose veins and diabetes as well as kidney and cardiac concerns.  He has skin changes and swelling that are consistent with stage II lymphedema.  he is appropriate for skilled occupational therapy to reduce lymphedema at bilateral lower extremities until a plateau in lymph volume reduction is achieved, permanent garments are obtained, education is completed and he is independent with complete decongestive therapy.  He reports that he will be leaving in 2 weeks to do a 2-month trip and would like to complete therapy before then.  -MP    OT Diagnosis -- Lymphedema  -MP    OT Rehab Potential Excellent  -MP --    Patient/caregiver participated in establishment of treatment plan and goals Yes  -MP --    Patient would benefit from skilled therapy intervention Yes  -MP --       OT Plan    OT Frequency 3x/week  3-5 times a week  -MP --    Predicted Duration of Therapy Intervention (OT) 2 weeks, after which she will be placed on therapeutic hold until after he returns from his 2-week trip, with reassessment at that point  -MP --    Planned CPT's? OT RE-EVAL: 82101;OT THER  PROC EA 15 MIN: 21171XE;OT SELF CARE/MGMT/TRAIN 15 MIN: 30163;OT MANUAL THERAPY EA 15 MIN: 74035;OT ORTHOTIC MGMT/TRAIN EA 15 MIN: 26446  -MP --    Planned Therapy Interventions (Optional Details) home exercise program;joint mobilization;manual therapy techniques;orthotic fitting/training;patient/family education;ROM (Range of Motion);strengthening  -MP --    OT Plan Comments Continue per plan of care  -MP --              User Key  (r) = Recorded By, (t) = Taken By, (c) = Cosigned By      Initials Name Provider Type    Chhaya Cabezas OT Occupational Therapist                          Goals:  1. Uncontrolled lymphedema of bilateral lower extremity/lower quadrant.  LTG1:  90 days:  Volumetric measurements of bilateral lower extremity/lower quadrant will be decreased by 10% or until plateau in reduction is achieved to improve functional mobility.           STATUS:  New   STG1a:  30 days:  Volumetric measurements of bilateral lower extremity/lower quadrant will be decreased by 5% bilaterally to improve functional mobility.    STATUS:  New  TREATMENT:  Manual Therapy, Therapeutic exercise, ADL/self-care therapy, Bioimpedence Fluid Analysis, Orthotic management and training, Therapeutic Activity, wound dressing as needed, Modalities: TENS, NMES, Ultrasound, Fluidotherapy, , and Patient and family/caregiver education.    2. Patient with decreased ADL/Self-Care routine for lymphedema management.   LTG 2:  90 days:  Patient/caregiver will independently verbalize/demonstrate ADL/Self-Care routine for lymphedema management.           STATUS:  New   STG 2a:  30 days:  Patient/caregiver will independently perform or verbally dictate compression wrapping technique of the lower extremity/lower quadrant.           STATUS:  New   STG2b:  30 days:  Patient will independently verbalize signs and symptoms of infection.                    STATUS:  New   STG2c:  30 days:  Patient will independently demonstrate/verbalize proper skin  care routine to prevent infection and or wound development.            STATUS:  New  STG2d:  30 days:  Patient will independently perform teach back of HEP routine.           STATUS: New  STG2e:  30 days:  Patient/caregiver will obtain properly fitting compression orthosis, will demonstrate don/doff skill of compression orthosis with modified independence, and independently verbalize wear schedule.          STATUS: New   STG2f: 30 days: Patient/caregiver will independently perform self-manual lymphatic drainage of the lower extremity/lower quadrant.          STATUS: New  TREATMENT:  Therapeutic Activity, Patient/Caregiver Education, ADL retraining, Manual Therapy, Orthotic Management and training, Modalities: TENS, NMES, Ultrasound, Fluidotherapy Wound dressing if necessary,  Therapeutic Exercise, Modalities     3. Self-Care Limitations     LTG 3: 90 days:  The patient will demonstrate improved quality of life by achieving a score of 32 or less on the Lymphedema Life Impact Scale.           STATUS:  New   STG 3a: 30 days:  The patient will demonstrate improved quality of life by achieving a score of 35  on the Lymphedema Life Impact Scale.           STATUS:  New  TREATMENT:  Manual therapy, therapeutic exercise, therapeutic activity, ADL retraining, Patient/caregiver education, Modalities: TENS, NMES, Ultrasound, Fluidotherapy Orthotic Management and Training, Wound dressing as needed.      Time Calculation:   Timed Charges  85886 - OT Self Care/Mgmt Minutes: 13  Total Minutes  Timed Charges Total Minutes: 13   Total Minutes: 13     Therapy Charges for Today       Code Description Service Date Service Provider Modifiers Qty    26993461688  OT SELF CARE/MGMT/TRAIN EA 15 MIN 1/2/2025 Chhaya Brunner OT GO 1    [id number]  OT EVAL MOD COMPLEXITY 4 1/2/2025 Chhaya Brunner OT GO 1              Chhaya Brunner OT  1/2/2025

## 2025-01-03 ENCOUNTER — HOSPITAL ENCOUNTER (OUTPATIENT)
Dept: OCCUPATIONAL THERAPY | Facility: HOSPITAL | Age: 75
Setting detail: THERAPIES SERIES
Discharge: HOME | End: 2025-01-03
Payer: MEDICARE

## 2025-01-03 DIAGNOSIS — E13.9 DIABETES 1.5, MANAGED AS TYPE 2: ICD-10-CM

## 2025-01-03 DIAGNOSIS — N18.31 CHRONIC KIDNEY DISEASE (CKD) STAGE G3A/A1, MODERATELY DECREASED GLOMERULAR FILTRATION RATE (GFR) BETWEEN 45-59 ML/MIN/1.73 SQUARE METER AND ALBUMINURIA CREATININE RATIO LESS THAN 30 MG/G (CMS/H*: ICD-10-CM

## 2025-01-03 DIAGNOSIS — I25.10 CORONARY ARTERY DISEASE INVOLVING NATIVE CORONARY ARTERY OF NATIVE HEART WITHOUT ANGINA PECTORIS: ICD-10-CM

## 2025-01-03 DIAGNOSIS — I89.0 LYMPHEDEMA: Primary | ICD-10-CM

## 2025-01-03 PROCEDURE — 97535 SELF CARE MNGMENT TRAINING: CPT

## 2025-01-03 PROCEDURE — 97140 MANUAL THERAPY 1/> REGIONS: CPT

## 2025-01-03 NOTE — THERAPY TREATMENT NOTE
Outpatient Occupational Therapy Lymphedema Treatment Note  KHANG Chaparro     Patient Name: Toñito uKo  : 1950  MRN: [id number]  Today's Date: 1/3/2025      Visit Date: 2025    Patient Active Problem List   Diagnosis    Coronary artery disease involving native coronary artery of native heart without angina pectoris    Hyperlipemia    Essential hypertension    Paroxysmal atrial fibrillation    Pedal edema    Stage 3a chronic kidney disease    Anemia    RAUL on CPAP    Type 2 diabetes mellitus with hyperglycemia, without long-term current use of insulin    Class 1 obesity    OA (osteoarthritis) of hip    Postoperative anemia due to acute blood loss    Dizziness    Screening for malignant neoplasm of colon    Family history of colon cancer        Past Medical History:   Diagnosis Date    Bladder disease     CAD (coronary artery disease) 2016    hx     Cancer     SKIN CANCER BASAL LEFT EYE AND EAR  AND SQAMOUS CELL RIGHT SIDE TORSO    Chest pain     DENIES ANY RECENT CP/SOA. FOLLOWED BY DR MARCELINO AND TRANSITION TO DR WHIPPLE. REPORTS HAS 35 [address]CR[address] OF LAND AND WORKS ON IT    Coronary atherosclerosis of native coronary vessel 2015    Diabetes mellitus     type 2. DOES NOT CHECK BS DAILY    GERD (gastroesophageal reflux disease)     Hard of hearing     HEARS BETTER OUT OF LEFT EAR. BILATERAL HEARING AID    Heart attack     HTN (hypertension)     Hyperlipemia     Osteoarthritis     BILATERAL HIPS    Paroxysmal atrial fibrillation 2021    Seasonal allergies     Sleep apnea     over 10 yrs ago    Stage 3a chronic kidney disease 2021        Past Surgical History:   Procedure Laterality Date    APPENDECTOMY          COLONOSCOPY      diverticulosis    COLONOSCOPY N/A 2024    Procedure: COLONOSCOPY;  Surgeon: Justin Burris MD;  Location: [facility] ENDOSCOPY;  Service: General;  Laterality: N/A;  DIVERTICULOSIS    CORONARY ARTERY BYPASS GRAFT      2011    HERNIA REPAIR      SKIN CANCER  EXCISION Right     TESTICLE SURGERY  1974    TOTAL HIP ARTHROPLASTY Right 10/03/2022    Procedure: TOTAL HIP ARTHROPLASTY ANTERIOR RIGHT;  Surgeon: Ravinder Hawley MD;  Location: [facility] OR;  Service: Orthopedics;  Laterality: Right;         Visit Dx:      ICD-10-CM ICD-9-CM   1. Lymphedema  I89.0 457.1   2. 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 Chronic kidney disease (CKD) stage G3a/A1, moderately decreased glomerular filtration rate (GFR) between 45-59 mL/min/1.73 square meter and albuminuria creatinine ratio less than 30 mg/g (CMS/H*  N18.31 585.3   4. Coronary artery disease involving native coronary artery of native heart without angina pectoris  I25.10 414.01        Lymphedema       Row Name 01/03/25 1600             Subjective Pain    Able to rate subjective pain? yes  -MP      Pre-Treatment Pain Level 0  -MP      Post-Treatment Pain Level 0  -MP      Subjective Pain Comment Toñito states no pain today  -MP         Subjective    Subjective Comments Mr. Kuo states that he reviewed forms he was given at last session  -MP         Lymphedema Assessment    Lymphedema Classification RLE:;LLE:;stage 2 (Spontaneously Irreversible)  -MP                User Key  (r) = Recorded By, (t) = Taken By, (c) = Cosigned By      Initials Name Provider Type    Chhaya Cabezas OT Occupational Therapist                     OT Assessment/Plan       Row Name 01/03/25 6451          OT Assessment    Assessment Comments Mr. Kuo tolerated intitial treatment well.  He is motivated and is expected to make excellent progress toward goals. He will continue to benefit from skilled OT for lymphedema treatment until a plateau in lymph volume is acheived, permanent garments are obtained, education is completed and he is independent with complete decongestive therapy.  -MP               User Key  (r) = Recorded By, (t) = Taken By, (c) = Cosigned By      Initials Name Provider Type    Chhaya Cabezas OT Occupational  Therapist                        Manual Rx (Last 36 Hours)       Manual Treatments       Row Name 01/03/25 1700             Total Minutes    04696 - OT Manual Therapy Minutes 46  -MP                User Key  (r) = Recorded By, (t) = Taken By, (c) = Cosigned By      Initials Name Provider Type    Chhaya Cabezas OT Occupational Therapist                  Therapy Education  Education Details: OT instructed patient in performance of self Manual lymphatic drainage technique and he return demonstrated properly. He was instructed to maintain compression that was applied today for at least 24 hours and as long as possible between sessions, unless it begins to droop, at which time he is to remove them, continue daily self manual lymphatic drainage and activity and elevate legs between activity.  He is agreeable.  Given: HEP, Symptoms/condition management  Program: New  How Provided: Verbal, Written  Provided to: Patient  Level of Understanding: Verbalized  05949 - OT Self Care/Mgmt Minutes: 15       Time Calculation:   Timed Charges  97927 - OT Manual Therapy Minutes: 46  62334 - OT Self Care/Mgmt Minutes: 15  Total Minutes  Timed Charges Total Minutes: 61   Total Minutes: 61     Therapy Charges for Today       Code Description Service Date Service Provider Modifiers Qty    [id number]  OT MANUAL THERAPY EA 15 MIN 1/3/2025 Chhaya Brunner OT GO 3    [id number]  OT SELF CARE/MGMT/TRAIN EA 15 MIN 1/3/2025 Chhaya Brunner OT GO 1              Chhaya Brunner OT  1/3/2025

## 2025-01-06 ENCOUNTER — APPOINTMENT (OUTPATIENT)
Dept: OCCUPATIONAL THERAPY | Facility: HOSPITAL | Age: 75
End: 2025-01-06
Payer: MEDICARE

## 2025-01-07 ENCOUNTER — APPOINTMENT (OUTPATIENT)
Dept: OCCUPATIONAL THERAPY | Facility: HOSPITAL | Age: 75
End: 2025-01-07
Payer: MEDICARE

## 2025-01-07 RX ORDER — ROSUVASTATIN CALCIUM 40 MG/1
40 TABLET, COATED ORAL DAILY
Qty: 90 TABLET | Refills: 3 | Status: SHIPPED | OUTPATIENT
Start: 2025-01-07

## 2025-01-08 ENCOUNTER — HOSPITAL ENCOUNTER (OUTPATIENT)
Dept: OCCUPATIONAL THERAPY | Facility: HOSPITAL | Age: 75
Setting detail: THERAPIES SERIES
Discharge: HOME | End: 2025-01-08
Payer: MEDICARE

## 2025-01-08 DIAGNOSIS — I25.10 CORONARY ARTERY DISEASE INVOLVING NATIVE CORONARY ARTERY OF NATIVE HEART WITHOUT ANGINA PECTORIS: ICD-10-CM

## 2025-01-08 DIAGNOSIS — N18.31 CHRONIC KIDNEY DISEASE (CKD) STAGE G3A/A1, MODERATELY DECREASED GLOMERULAR FILTRATION RATE (GFR) BETWEEN 45-59 ML/MIN/1.73 SQUARE METER AND ALBUMINURIA CREATININE RATIO LESS THAN 30 MG/G (CMS/H*: ICD-10-CM

## 2025-01-08 DIAGNOSIS — I89.0 LYMPHEDEMA: Primary | ICD-10-CM

## 2025-01-08 DIAGNOSIS — E13.9 DIABETES 1.5, MANAGED AS TYPE 2: ICD-10-CM

## 2025-01-08 PROCEDURE — 97140 MANUAL THERAPY 1/> REGIONS: CPT

## 2025-01-08 PROCEDURE — 97535 SELF CARE MNGMENT TRAINING: CPT

## 2025-01-08 NOTE — THERAPY TREATMENT NOTE
Outpatient Occupational Therapy Lymphedema Treatment Note  KHANG Chaparro     Patient Name: Toñito Kuo  : 1950  MRN: [id number]  Today's Date: 2025      Visit Date: 2025    Patient Active Problem List   Diagnosis    Coronary artery disease involving native coronary artery of native heart without angina pectoris    Hyperlipemia    Essential hypertension    Paroxysmal atrial fibrillation    Pedal edema    Stage 3a chronic kidney disease    Anemia    RAUL on CPAP    Type 2 diabetes mellitus with hyperglycemia, without long-term current use of insulin    Class 1 obesity    OA (osteoarthritis) of hip    Postoperative anemia due to acute blood loss    Dizziness    Screening for malignant neoplasm of colon    Family history of colon cancer        Past Medical History:   Diagnosis Date    Bladder disease     CAD (coronary artery disease) 2016    hx     Cancer     SKIN CANCER BASAL LEFT EYE AND EAR  AND SQAMOUS CELL RIGHT SIDE TORSO    Chest pain     DENIES ANY RECENT CP/SOA. FOLLOWED BY DR MARCELINO AND TRANSITION TO DR WHIPPLE. REPORTS HAS [address] OF LAND AND WORKS ON IT    Coronary atherosclerosis of native coronary vessel 2015    Diabetes mellitus     type 2. DOES NOT CHECK BS DAILY    GERD (gastroesophageal reflux disease)     Hard of hearing     HEARS BETTER OUT OF LEFT EAR. BILATERAL HEARING AID    Heart attack     HTN (hypertension)     Hyperlipemia     Osteoarthritis     BILATERAL HIPS    Paroxysmal atrial fibrillation 2021    Seasonal allergies     Sleep apnea     over 10 yrs ago    Stage 3a chronic kidney disease 2021        Past Surgical History:   Procedure Laterality Date    APPENDECTOMY          COLONOSCOPY      diverticulosis    COLONOSCOPY N/A 2024    Procedure: COLONOSCOPY;  Surgeon: Justin Burris MD;  Location: [facility] ENDOSCOPY;  Service: General;  Laterality: N/A;  DIVERTICULOSIS    CORONARY ARTERY BYPASS GRAFT      2011    HERNIA REPAIR      SKIN CANCER  EXCISION Right     TESTICLE SURGERY  1974    TOTAL HIP ARTHROPLASTY Right 10/03/2022    Procedure: TOTAL HIP ARTHROPLASTY ANTERIOR RIGHT;  Surgeon: Ravinder Hawley MD;  Location: [facility] MAIN OR;  Service: Orthopedics;  Laterality: Right;         Visit Dx:      ICD-10-CM ICD-9-CM   1. Lymphedema  I89.0 457.1   2. 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 Chronic kidney disease (CKD) stage G3a/A1, moderately decreased glomerular filtration rate (GFR) between 45-59 mL/min/1.73 square meter and albuminuria creatinine ratio less than 30 mg/g (CMS/H*  N18.31 585.3   4. Coronary artery disease involving native coronary artery of native heart without angina pectoris  I25.10 414.01        Lymphedema       Row Name 01/08/25 1600             Subjective Pain    Able to rate subjective pain? yes  -MP      Pre-Treatment Pain Level 0  -MP      Post-Treatment Pain Level 0  -MP      Subjective Pain Comment Toñito states no pain today.  -MP         Subjective    Subjective Comments Mr. Kuo states he removed compression on Sunday, when they began to droop.  -MP         Lymphedema Assessment    Lymphedema Classification RLE:;LLE:;stage 2 (Spontaneously Irreversible)  -MP         Lymphedema Edema Assessment    Ptting Edema Category By grade out of 4  -MP      Pitting Edema + 1/4 (+1 of 4)  -MP      [place] Sign bilateral:;negative  -MP      [place]ump bilateral:;negative  -MP         Skin Changes/Observations    Location/Assessment Lower Extremity  -MP      Lower Extremity Conditions bilateral:;intact;clean;hairless;scaly;inflamed  -MP      Lower Extremity Color/Pigment bilateral:;erythema;hyperpigmented;fibrosis  -MP         Lymphedema Sensation    Lymphedema Sensation Reports tingling  -MP         Lymphedema Pulses/Capillary Refill    Lymphedema Pulses/Capillary Refill capillary refill  -MP      Capillary Refill lower extremity capillary refill  -MP      Lower Extremity Capillary Refill less than 3 seconds  -MP          "Compression/Skin Care    Compression/Skin Care skin care;wrapping location;compression garment;bandaging  -MP      Skin Care lotion applied  -MP      Wrapping Location lower extremity  -MP      Wrapping Location LE bilateral:;foot to knee  -MP      Bandage Layers cotton liner;soft foam- 1/4 inch;short-stretch bandages (comment size/quantity);padding/fluff layer  -MP      Bandaging Comments 1/4\" phone at the lower half of the leg and just proximal to the ankle to build shape  -MP      Bandaging Technique circumferential/spiral;strong compression  -MP      Compression Garment Comments Patient is asked to don compression stockings 20-30 mmHg that he currently has at home should he have to remove compression garments if they do begin to droop.  -MP                User Key  (r) = Recorded By, (t) = Taken By, (c) = Cosigned By      Initials Name Provider Type    Chhaya Cabezas OT Occupational Therapist                      Manual Rx (Last 36 Hours)       Manual Treatments       Row Name 01/08/25 1717             Total Minutes    23085 - OT Manual Therapy Minutes 38  -MP                User Key  (r) = Recorded By, (t) = Taken By, (c) = Cosigned By      Initials Name Provider Type    Chhaya Cabezas OT Occupational Therapist                    Therapy Education  Education Details: Patient was educated that he has visually evident reduction lymphedema at bilateral lower extremities.  He is asked to maintain compression that was applied on this date until next treatment session unless it begins to droop or become uncomfortable.  If he has to remove it prior to next treatment session he is to elevate the legs is much as possible and do not 20-30 mmHg compression stockings that he currently owns in order to help as much as possible to prevent increase in volume.  He is educated that he will be measured for permanent compression stockings at next treatment session.  He is willing to purchase 1-2 pair privately in order " to allow him to receive them prior to going on vacation at the end of next week.  Given: HEP, Symptoms/condition management  Program: New, Reinforced  How Provided: Verbal  Provided to: Patient  Level of Understanding: Verbalized  01121 - OT Self Care/Mgmt Minutes: 10          Time Calculation:   Timed Charges  82551 - OT Manual Therapy Minutes: 38  74082 - OT Self Care/Mgmt Minutes: 10  Total Minutes  Timed Charges Total Minutes: 48   Total Minutes: 48     Therapy Charges for Today       Code Description Service Date Service Provider Modifiers Qty    73386154503 HC OT MANUAL THERAPY EA 15 MIN 1/8/2025 Chhaya Brunner, OT GO 2    41515807591 HC OT SELF CARE/MGMT/TRAIN EA 15 MIN 1/8/2025 Chhaya Brunner OT GO 1            Chhaya Brunner OT  1/8/2025

## 2025-01-09 ENCOUNTER — APPOINTMENT (OUTPATIENT)
Dept: OCCUPATIONAL THERAPY | Facility: HOSPITAL | Age: 75
End: 2025-01-09
Payer: MEDICARE

## 2025-01-10 ENCOUNTER — HOSPITAL ENCOUNTER (OUTPATIENT)
Dept: OCCUPATIONAL THERAPY | Facility: HOSPITAL | Age: 75
Setting detail: THERAPIES SERIES
Discharge: HOME | End: 2025-01-10
Payer: MEDICARE

## 2025-01-10 DIAGNOSIS — I89.0 LYMPHEDEMA: Primary | ICD-10-CM

## 2025-01-10 DIAGNOSIS — I25.10 CORONARY ARTERY DISEASE INVOLVING NATIVE CORONARY ARTERY OF NATIVE HEART WITHOUT ANGINA PECTORIS: ICD-10-CM

## 2025-01-10 DIAGNOSIS — E13.9 DIABETES 1.5, MANAGED AS TYPE 2: ICD-10-CM

## 2025-01-10 DIAGNOSIS — N18.31 CHRONIC KIDNEY DISEASE (CKD) STAGE G3A/A1, MODERATELY DECREASED GLOMERULAR FILTRATION RATE (GFR) BETWEEN 45-59 ML/MIN/1.73 SQUARE METER AND ALBUMINURIA CREATININE RATIO LESS THAN 30 MG/G (CMS/H*: ICD-10-CM

## 2025-01-10 PROCEDURE — 97140 MANUAL THERAPY 1/> REGIONS: CPT

## 2025-01-10 PROCEDURE — 97535 SELF CARE MNGMENT TRAINING: CPT

## 2025-01-10 NOTE — THERAPY TREATMENT NOTE
Outpatient Occupational Therapy Lymphedema Treatment Note  KHANG Chaparro     Patient Name: Toñito Kuo  : 1950  MRN: [id number]  Today's Date: 1/10/2025      Visit Date: 01/10/2025    Patient Active Problem List   Diagnosis    Coronary artery disease involving native coronary artery of native heart without angina pectoris    Hyperlipemia    Essential hypertension    Paroxysmal atrial fibrillation    Pedal edema    Stage 3a chronic kidney disease    Anemia    RAUL on CPAP    Type 2 diabetes mellitus with hyperglycemia, without long-term current use of insulin    Class 1 obesity    OA (osteoarthritis) of hip    Postoperative anemia due to acute blood loss    Dizziness    Screening for malignant neoplasm of colon    Family history of colon cancer        Past Medical History:   Diagnosis Date    Bladder disease     CAD (coronary artery disease) 2016    hx     Cancer     SKIN CANCER BASAL LEFT EYE AND EAR  AND SQAMOUS CELL RIGHT SIDE TORSO    Chest pain     DENIES ANY RECENT CP/SOA. FOLLOWED BY DR MARCELINO AND TRANSITION TO DR WHIPPLE. REPORTS HAS [address] AND WORKS ON IT    Coronary atherosclerosis of native coronary vessel 2015    Diabetes mellitus     type 2. DOES NOT CHECK BS DAILY    GERD (gastroesophageal reflux disease)     Hard of hearing     HEARS BETTER OUT OF LEFT EAR. BILATERAL HEARING AID    Heart attack     HTN (hypertension)     Hyperlipemia     Osteoarthritis     BILATERAL HIPS    Paroxysmal atrial fibrillation 2021    Seasonal allergies     Sleep apnea     over 10 yrs ago    Stage 3a chronic kidney disease 2021        Past Surgical History:   Procedure Laterality Date    APPENDECTOMY          COLONOSCOPY      diverticulosis    COLONOSCOPY N/A 2024    Procedure: COLONOSCOPY;  Surgeon: Justin Burris MD;  Location: [facility] ENDOSCOPY;  Service: General;  Laterality: N/A;  DIVERTICULOSIS    CORONARY ARTERY BYPASS GRAFT      2011    HERNIA REPAIR      SKIN CANCER  EXCISION Right     TESTICLE SURGERY  1974    TOTAL HIP ARTHROPLASTY Right 10/03/2022    Procedure: TOTAL HIP ARTHROPLASTY ANTERIOR RIGHT;  Surgeon: Ravinder Hawley MD;  Location: [facility] MAIN OR;  Service: Orthopedics;  Laterality: Right;         Visit Dx:      ICD-10-CM ICD-9-CM   1. Lymphedema  I89.0 457.1   2. 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 Chronic kidney disease (CKD) stage G3a/A1, moderately decreased glomerular filtration rate (GFR) between 45-59 mL/min/1.73 square meter and albuminuria creatinine ratio less than 30 mg/g (CMS/H*  N18.31 585.3   4. Coronary artery disease involving native coronary artery of native heart without angina pectoris  I25.10 414.01        Lymphedema       Row Name 01/10/25 0900             Subjective Pain    Able to rate subjective pain? yes  -SC      Pre-Treatment Pain Level 5  -SC      Post-Treatment Pain Level 5  -SC      Subjective Pain Comment R lateral foot  -SC         Subjective    Subjective Comments Patient states he is ready to order permanent compression garments  -SC         Skin Changes/Observations    Location/Assessment Lower Extremity  -SC      Lower Extremity Conditions bilateral:;intact;clean;hairless;scaly;inflamed  -SC      Lower Extremity Color/Pigment bilateral:;erythema;hyperpigmented;fibrosis  -SC         Lymphedema Measurements    Measurement Type(s) Circumferential  -SC      Circumferential Areas Lower extremities  -SC         Compression/Skin Care    Compression/Skin Care remove bandages  -SC      Compression/Skin Care Comments Patient was not wrapped this date  -SC                User Key  (r) = Recorded By, (t) = Taken By, (c) = Cosigned By      Initials Name Provider Type    SC Malinda Wilks COTA Occupational Therapist Assistant                Circumferential Measurements       Baseline: R: 3267.18 ml L: 3544.75 ml  % volume change: R: -9%   L: -17%  Note: 1 measurement taken above the knee             OT Assessment/Plan       Row  Name 01/10/25 1103          OT Assessment    Assessment Comments Patient has met plateau and is ready for permanent compression garments which he will be ordering online out-of-pocket.  Patient will benefit from continued skilled occupational therapy services until patient has demonstrated independence with complete decongestive therapy.  -SC     Patient would benefit from skilled therapy intervention Yes  -SC        OT Plan    OT Plan Comments Continue POC  -SC               User Key  (r) = Recorded By, (t) = Taken By, (c) = Cosigned By      Initials Name Provider Type    Malinda Valdes COTA Occupational Therapist Assistant                        Manual Rx (Last 36 Hours)       Manual Treatments       Row Name 01/10/25 1106             Total Minutes    51651 - OT Manual Therapy Minutes 10  -SC                User Key  (r) = Recorded By, (t) = Taken By, (c) = Cosigned By      Initials Name Provider Type    Malinda Valdes COTA Occupational Therapist Assistant                      Therapy Education  Education Details: Therapist providing patient with sizing and his measurements for him to purchase out-of-pocket 30 to 40 mmHg compression stockings online.  Patient was educated to don his 20 to 30 mmHg compression stockings when he gets home and to start the 30 to 40 mmHg compression stockings whenever they are obtained.  Patient will arrive at next appointment hopefully with new compression stockings.  Patient is leaving for [place] for 3 months at the end of next week and if patient is showing independence with complete decongestive therapy with no issues and met goals patient will be discharged at that time  Given: Symptoms/condition management  Program: New  How Provided: Verbal  Provided to: Patient  Level of Understanding: Verbalized  35191 - OT Self Care/Mgmt Minutes: 38                Time Calculation:   Timed Charges  46282 - OT Manual Therapy Minutes: 10  04299 - OT Self Care/Mgmt Minutes: 38  Total  Minutes  Timed Charges Total Minutes: 48   Total Minutes: 48     Therapy Charges for Today       Code Description Service Date Service Provider Modifiers Qty    81025020277 HC OT MANUAL THERAPY EA 15 MIN 1/10/2025 Malinda Wilks COTA GO 1    50354397812 HC OT SELF CARE/MGMT/TRAIN EA 15 MIN 1/10/2025 Malinda Wilks COTA GO 2                        LUIS Marino  1/10/2025

## 2025-01-14 ENCOUNTER — HOSPITAL ENCOUNTER (OUTPATIENT)
Dept: OCCUPATIONAL THERAPY | Facility: HOSPITAL | Age: 75
Setting detail: THERAPIES SERIES
Discharge: HOME | End: 2025-01-14
Payer: MEDICARE

## 2025-01-14 DIAGNOSIS — I89.0 LYMPHEDEMA: Primary | ICD-10-CM

## 2025-01-14 DIAGNOSIS — E13.9 DIABETES 1.5, MANAGED AS TYPE 2: ICD-10-CM

## 2025-01-14 DIAGNOSIS — I25.10 CORONARY ARTERY DISEASE INVOLVING NATIVE CORONARY ARTERY OF NATIVE HEART WITHOUT ANGINA PECTORIS: ICD-10-CM

## 2025-01-14 DIAGNOSIS — N18.31 CHRONIC KIDNEY DISEASE (CKD) STAGE G3A/A1, MODERATELY DECREASED GLOMERULAR FILTRATION RATE (GFR) BETWEEN 45-59 ML/MIN/1.73 SQUARE METER AND ALBUMINURIA CREATININE RATIO LESS THAN 30 MG/G (CMS/H*: ICD-10-CM

## 2025-01-14 PROCEDURE — 97535 SELF CARE MNGMENT TRAINING: CPT

## 2025-01-14 NOTE — THERAPY TREATMENT NOTE
Outpatient Occupational Therapy Lymphedema Treatment Note  KHANG Chaparro     Patient Name: Toñito Kuo  : 1950  MRN: [id number]  Today's Date: 2025      Visit Date: 2025    Patient Active Problem List   Diagnosis    Coronary artery disease involving native coronary artery of native heart without angina pectoris    Hyperlipemia    Essential hypertension    Paroxysmal atrial fibrillation    Pedal edema    Stage 3a chronic kidney disease    Anemia    RAUL on CPAP    Type 2 diabetes mellitus with hyperglycemia, without long-term current use of insulin    Class 1 obesity    OA (osteoarthritis) of hip    Postoperative anemia due to acute blood loss    Dizziness    Screening for malignant neoplasm of colon    Family history of colon cancer        Past Medical History:   Diagnosis Date    Bladder disease     CAD (coronary artery disease) 2016    hx     Cancer     SKIN CANCER BASAL LEFT EYE AND EAR  AND SQAMOUS CELL RIGHT SIDE TORSO    Chest pain     DENIES ANY RECENT CP/SOA. FOLLOWED BY DR MARCELINO AND TRANSITION TO DR WHIPPLE. REPORTS HAS [address] OF [address]AND AND WORKS ON IT    Coronary atherosclerosis of native coronary vessel 2015    Diabetes mellitus     type 2. DOES NOT CHECK BS DAILY    GERD (gastroesophageal reflux disease)     Hard of hearing     HEARS BETTER OUT OF LEFT EAR. BILATERAL HEARING AID    Heart attack     HTN (hypertension)     Hyperlipemia     Osteoarthritis     BILATERAL HIPS    Paroxysmal atrial fibrillation 2021    Seasonal allergies     Sleep apnea     over 10 yrs ago    Stage 3a chronic kidney disease 2021        Past Surgical History:   Procedure Laterality Date    APPENDECTOMY          COLONOSCOPY      diverticulosis    COLONOSCOPY N/A 2024    Procedure: COLONOSCOPY;  Surgeon: Justin Burris MD;  Location: [facility] ENDOSCOPY;  Service: General;  Laterality: N/A;  DIVERTICULOSIS    CORONARY ARTERY BYPASS GRAFT      2011    HERNIA REPAIR      SKIN CANCER  "EXCISION Right     TESTICLE SURGERY  1974    TOTAL HIP ARTHROPLASTY Right 10/03/2022    Procedure: TOTAL HIP ARTHROPLASTY ANTERIOR RIGHT;  Surgeon: Ravinder Hawley MD;  Location: [facility] OR;  Service: Orthopedics;  Laterality: Right;         Visit Dx:      ICD-10-CM ICD-9-CM   1. Lymphedema  I89.0 457.1   2. 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 Chronic kidney disease (CKD) stage G3a/A1, moderately decreased glomerular filtration rate (GFR) between 45-59 mL/min/1.73 square meter and albuminuria creatinine ratio less than 30 mg/g (CMS/H*  N18.31 585.3   4. Coronary artery disease involving native coronary artery of native heart without angina pectoris  I25.10 414.01        Lymphedema       Row Name 01/14/25 1300             Subjective Pain    Able to rate subjective pain? yes  -SC      Pre-Treatment Pain Level 0  -SC      Post-Treatment Pain Level 0  -SC      Subjective Pain Comment Pt denies pain  -SC         Subjective    Subjective Comments Pt states he is ready to be discharged. Pt states he leaves this weekend for 3 months and has his garments arriving today. Pt states he feels confident with complete decongestive therapy.  -SC         LLIS - Physical Concerns    The amount of pain associated with my lymphedema is: 0  -SC      The amount of limb heaviness associated with my lymphedema is: 0  -SC      The amount of skin tightness associated with my lymphedema is: 1  -SC      The size of my swollen limb(s) seems: 0  -SC      Lymphedema affects the movement of my swollen limb(s): 0  -SC      The strength in my swollen limb(s) is: 2  -SC         LLIS - Psychosocial Concerns    Lymphedema affects my body image (i.e., \"how I think I look\"). 0  -SC      Lymphedema affects my socializing with others. 0  -SC      Lymphedema affects my intimate relations with spouse or partner (rate 0 if not applicable 0  -SC      Lymphedema \"gets me down\" (i.e., depression, frustration, or anger) 0  -SC      I must rely " on others for help due to my lymphedema. 0  -SC      I know what to do to manage my lymphedema 0  -SC         LLIS - Functional Concerns    Lymphedema affects my ability to perform self-care activities (i.e. eating, dressing, hygiene) 0  -SC      Lymphedema affects my ability to perform routine home or work-related activities. 0  -SC      Lymphedema affects my performance of preferred leisure activities. 0  -SC      Lymphedema affects proper fit of clothing/shoes 0  -SC      Lymphedema affects my sleep 0  -SC         Skin Changes/Observations    Location/Assessment Lower Extremity  -SC      Lower Extremity Conditions bilateral:;intact;clean;hairless  -SC      Lower Extremity Color/Pigment bilateral:;erythema;hyperpigmented  -SC         Lymphedema Measurements    Measurement Type(s) Circumferential  -SC      Circumferential Areas Lower extremities  -SC         Compression/Skin Care    Compression/Skin Care compression garment  -SC      Compression Garment Comments 20-30mmhg compression knee high stocking  -SC         Lymphedema Life Impact Scale Totals    A.  Total Q1 - Q17 (Do not include Q18) 3  -SC      B.  Total number of questions answered (Q1-Q17) 17  -SC      C. Divide A by B 0.18  -SC      D. Multiple C by 25 4.5  -SC                User Key  (r) = Recorded By, (t) = Taken By, (c) = Cosigned By      Initials Name Provider Type    Malidna Valdes COTA Occupational Therapist Assistant                Circumferential Measurements       Baseline: R: 3267.18 ml L: 3544.75 ml  % volume change: R: -10%   L: -13%  Note: 1 measurement taken above the knee             OT Assessment/Plan       Row Name 01/14/25 5174          OT Assessment    Assessment Comments Patient is receiving permanent compression garments today however does not have them with him at this appointment.  Patient states he feels comfortable with the compression garments and is leaving in a couple days for a 2-month trip and is ready for a  therapeutic hold until he returns.  Patient will benefit from continued skilled occupational therapy to assess patient independence with complete decongestive therapy once he returns from trip.  -SC     Patient would benefit from skilled therapy intervention Yes  -SC               User Key  (r) = Recorded By, (t) = Taken By, (c) = Cosigned By      Initials Name Provider Type    SC Malinda Wilks COTA Occupational Therapist Assistant                              Therapy Education  Education Details: Therapist educating patient on different don/doffing techniques for 30 to 40 mmHg compression stockings that he will be obtaining today and tomorrow.  Patient states that he does not feel he needs to come in this week to have them assessed that he feels comfortable with donning compression garments.  Patient states he is ready for therapeutic hold until he returns from his 2-month trip.  Patient was placed on the schedule once he returns.  Patient states that if he feels he is doing okay he will contact the clinic to cancel that appointment when he gets back.  Therapist reviewing signs and symptoms of infection and complete decongestive therapy to ensure good understanding.  Given: Symptoms/condition management  Program: New, Reinforced  How Provided: Verbal, Demonstration  Provided to: Patient  Level of Understanding: Verbalized, Teach back education performed, Demonstrated  03527 - OT Self Care/Mgmt Minutes: 30                Time Calculation:   Timed Charges  40219 - OT Self Care/Mgmt Minutes: 30  Total Minutes  Timed Charges Total Minutes: 30   Total Minutes: 30     Therapy Charges for Today       Code Description Service Date Service Provider Modifiers Qty    81117947945  OT SELF CARE/MGMT/TRAIN EA 15 MIN 1/14/2025 Malinda Wilks COTA GO 2                        LUIS Marino  1/14/2025

## 2025-01-16 ENCOUNTER — APPOINTMENT (OUTPATIENT)
Dept: OCCUPATIONAL THERAPY | Facility: HOSPITAL | Age: 75
End: 2025-01-16
Payer: MEDICARE

## 2025-02-03 RX ORDER — METFORMIN HYDROCHLORIDE 500 MG/1
TABLET, EXTENDED RELEASE ORAL
Qty: 180 TABLET | Refills: 3 | Status: SHIPPED | OUTPATIENT
Start: 2025-02-03

## 2025-03-20 ENCOUNTER — APPOINTMENT (OUTPATIENT)
Dept: OCCUPATIONAL THERAPY | Facility: HOSPITAL | Age: 75
End: 2025-03-20
Payer: MEDICARE

## 2025-03-25 ENCOUNTER — HOSPITAL ENCOUNTER (OUTPATIENT)
Dept: OCCUPATIONAL THERAPY | Facility: HOSPITAL | Age: 75
Setting detail: THERAPIES SERIES
Discharge: HOME | End: 2025-03-25
Payer: MEDICARE

## 2025-03-25 DIAGNOSIS — N18.31 CHRONIC KIDNEY DISEASE (CKD) STAGE G3A/A1, MODERATELY DECREASED GLOMERULAR FILTRATION RATE (GFR) BETWEEN 45-59 ML/MIN/1.73 SQUARE METER AND ALBUMINURIA CREATININE RATIO LESS THAN 30 MG/G (CMS/H*: ICD-10-CM

## 2025-03-25 DIAGNOSIS — E13.9 DIABETES 1.5, MANAGED AS TYPE 2: ICD-10-CM

## 2025-03-25 DIAGNOSIS — I89.0 LYMPHEDEMA: Primary | ICD-10-CM

## 2025-03-25 DIAGNOSIS — I25.10 CORONARY ARTERY DISEASE INVOLVING NATIVE CORONARY ARTERY OF NATIVE HEART WITHOUT ANGINA PECTORIS: ICD-10-CM

## 2025-03-25 PROCEDURE — 97140 MANUAL THERAPY 1/> REGIONS: CPT

## 2025-03-25 PROCEDURE — 97535 SELF CARE MNGMENT TRAINING: CPT

## 2025-03-25 NOTE — THERAPY RE-EVALUATION
Outpatient Occupational Therapy Lymphedema Re-Evaluation  KHANG Chaparro     Patient Name: Toñito Kuo  : 1950  MRN: [id number]  Today's Date: 3/25/2025      Visit Date: 2025    Patient Active Problem List   Diagnosis    Coronary artery disease involving native coronary artery of native heart without angina pectoris    Hyperlipemia    Essential hypertension    Paroxysmal atrial fibrillation    Pedal edema    Stage 3a chronic kidney disease    Anemia    RAUL on CPAP    Type 2 diabetes mellitus with hyperglycemia, without long-term current use of insulin    Class 1 obesity    OA (osteoarthritis) of hip    Postoperative anemia due to acute blood loss    Dizziness    Screening for malignant neoplasm of colon    Family history of colon cancer        Past Medical History:   Diagnosis Date    Bladder disease     CAD (coronary artery disease) 2016    hx     Cancer     SKIN CANCER BASAL LEFT EYE AND EAR  AND SQAMOUS CELL RIGHT SIDE TORSO    Chest pain     DENIES ANY RECENT CP/SOA. FOLLOWED BY DR MARCELINO AND TRANSITION TO DR WHIPPLE. REPORTS HAS [address] AND WORKS ON IT    Coronary atherosclerosis of native coronary vessel 2015    Diabetes mellitus     type 2. DOES NOT CHECK BS DAILY    GERD (gastroesophageal reflux disease)     Hard of hearing     HEARS BETTER OUT OF LEFT EAR. BILATERAL HEARING AID    Heart attack     HTN (hypertension)     Hyperlipemia     Osteoarthritis     BILATERAL HIPS    Paroxysmal atrial fibrillation 2021    Seasonal allergies     Sleep apnea     over 10 yrs ago    Stage 3a chronic kidney disease 2021        Past Surgical History:   Procedure Laterality Date    APPENDECTOMY          COLONOSCOPY      diverticulosis    COLONOSCOPY N/A 2024    Procedure: COLONOSCOPY;  Surgeon: Justin Burris MD;  Location: [facility] ENDOSCOPY;  Service: General;  Laterality: N/A;  DIVERTICULOSIS    CORONARY ARTERY BYPASS GRAFT      2011    HERNIA REPAIR      SKIN CANCER  EXCISION Right     TESTICLE SURGERY  1974    TOTAL HIP ARTHROPLASTY Right 10/03/2022    Procedure: TOTAL HIP ARTHROPLASTY ANTERIOR RIGHT;  Surgeon: Ravinder Hawley MD;  Location: [facility] MAIN OR;  Service: Orthopedics;  Laterality: Right;         Visit Dx:     ICD-10-CM ICD-9-CM   1. Lymphedema  I89.0 457.1   2. Chronic kidney disease (CKD) stage G3a/A1, moderately decreased glomerular filtration rate (GFR) between 45-59 mL/min/1.73 square meter and albuminuria creatinine ratio less than 30 mg/g (CMS/H*  N18.31 585.3   3. 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 Coronary artery disease involving native coronary artery of native heart without angina pectoris  I25.10 414.01        Patient History       Row Name 03/25/25 1000             History    Chief Complaint Swelling;Pain;Tightness;Tinglings  -MP      Type of Pain Lower Extremity / Leg  -MP      Date Current Problem(s) Began 01/02/19  -MP      Brief Description of Current Complaint Patient reports history of varicose vein concerns for about 15 years with Sclerotherapy about 15 years ago.  He reports that the swelling in his legs of approximately 10 years duration, and it has become more noticable in the last 5 years.  He has tried elevation, and compression. He is returning from a 2 month trip today.  -MP      Previous treatment for THIS PROBLEM --  compression and elevation  -MP      Patient/Caregiver Goals Decrease swelling  -MP      Current Tobacco Use no  -MP      Smoking Status no  -MP      Patient's Rating of General Health Good  -MP      Hand Dominance ambidextrous  -MP      Occupation/sports/leisure activities Retired  -MP      Patient seeing anyone else for problem(s)? no  -MP      How has patient tried to help current problem? elevation and compression  -MP         Fall Risk Assessment    Any falls in the past year: Yes  -MP      Number of falls reported in the last 12 months 3  -MP      Factors that contributed to the fall: Tripped  -MP       Does patient have a fear of falling No  -MP         Services    Prior Rehab/Home Health Experiences No  -MP      Are you currently receiving Home Health services No  -MP      Do you plan to receive Home Health services in the near future No  -MP         Daily Activities    Primary Language English  -MP      Are you able to read Yes  -MP      Are you able to write Yes  -MP      How does patient learn best? Reading;Demonstration  -MP      Teaching needs identified Home Exercise Program;Management of Condition;Falls Prevention  -MP      Barriers to learning None  -MP      Pt Participated in POC and Goals Yes  -MP         Safety    Are you being hurt, hit, or frightened by anyone at home or in your life? No  -MP      Are you being neglected by a caregiver No  -MP      Have you had any of the following issues with N/A  -MP                User Key  (r) = Recorded By, (t) = Taken By, (c) = Cosigned By      Initials Name Provider Type    Chhaya Cabezas OT Occupational Therapist                     Lymphedema       Row Name 03/25/25 1400 03/25/25 1000          Subjective Pain    Able to rate subjective pain? -- yes  -MP     Pre-Treatment Pain Level -- 0  -MP     Post-Treatment Pain Level -- 0  -MP     Subjective Pain Comment -- Toñito denies pain today  -MP        Subjective    Subjective Comments -- Pt arrived today with compression garments on.  -MP        Lymphedema Assessment    Lymphedema Classification -- RLE:;LLE:;stage 2 (Spontaneously Irreversible)  -MP     Infections or Cellulitis? -- yes  -MP        LLIS - Physical Concerns    The amount of pain associated with my lymphedema is: -- 0  -MP     The amount of limb heaviness associated with my lymphedema is: -- 0  -MP     The amount of skin tightness associated with my lymphedema is: -- 2  -MP     The size of my swollen limb(s) seems: -- 1  -MP     Lymphedema affects the movement of my swollen limb(s): -- 0  -MP     The strength in my swollen limb(s) is: -- 2  -MP   "      LLIS - Psychosocial Concerns    Lymphedema affects my body image (i.e., \"how I think I look\"). -- 0  -MP     Lymphedema affects my socializing with others. -- 0  -MP     Lymphedema affects my intimate relations with spouse or partner (rate 0 if not applicable -- 0  -MP     Lymphedema \"gets me down\" (i.e., depression, frustration, or anger) -- 0  -MP     I must rely on others for help due to my lymphedema. -- 0  -MP     I know what to do to manage my lymphedema -- 0  -MP        LLIS - Functional Concerns    Lymphedema affects my ability to perform self-care activities (i.e. eating, dressing, hygiene) -- 0  -MP     Lymphedema affects my ability to perform routine home or work-related activities. -- 0  -MP     Lymphedema affects my performance of preferred leisure activities. -- 0  -MP     Lymphedema affects proper fit of clothing/shoes -- 0  -MP     Lymphedema affects my sleep -- 0  -MP        Posture/Observations    Posture- WNL -- Posture is WNL  -MP        General ROM    GENERAL ROM COMMENTS -- Hip Rotation is limited. Uses a long shoe horn and has help with toenails.  -MP        MMT (Manual Muscle Testing)    General MMT Comments -- Grossly 3+/5 throughout bilateral LEs within available ROM  -MP        Lymphedema Edema Assessment    Ptting Edema Category -- By grade out of 4  -MP     Pitting Edema -- + 1/4 (+1 of 4)  -MP     Stemmer Sign -- bilateral:;negative  -MP     [place] Hump -- bilateral:;negative  -MP        Skin Changes/Observations    Location/Assessment -- Lower Extremity  -MP     Lower Extremity Conditions -- bilateral:;intact;clean;hairless  -MP     Lower Extremity Color/Pigment -- bilateral:;erythema;hyperpigmented  -MP     Lower Extremity Skin Details -- Anterior Left calf with small scab and surrounding skin with erythema and slight increase in temperature compared to currounding areas.  -MP        Lymphedema Sensation    Lymphedema Sensation Reports -- tingling  -MP        Lymphedema " Pulses/Capillary Refill    Lymphedema Pulses/Capillary Refill -- capillary refill  -MP     Capillary Refill -- lower extremity capillary refill  -MP     Lower Extremity Capillary Refill -- less than 3 seconds  -MP        Lymphedema Measurements    Measurement Type(s) -- Circumferential  -MP     Circumferential Areas -- Lower extremities  -MP        Compression/Skin Care    Compression/Skin Care compression garment  -MP --     Compression Garment Comments 20-30 mmHg compression stockings  -MP --        Lymphedema Life Impact Scale Totals    A.  Total Q1 - Q17 (Do not include Q18) -- 5  -MP     B.  Total number of questions answered (Q1-Q17) -- 17  -MP     C. Divide A by B -- 0.29  -MP     D. Multiple C by 25 -- 7.25  -MP               User Key  (r) = Recorded By, (t) = Taken By, (c) = Cosigned By      Initials Name Provider Type    Chhaya Cabezas OT Occupational Therapist                  Circumferential Measurements:        Baseline: R: 3267.18 ml L: 3544.75 ml  % volume change: R: -8%   L: -17%  Note: 1 measurement taken above the knee       Therapy Education  Education Details: Infection control and awareness of signs and symptoms of cellulitis.  He sees dermatology appointment next week, and OT asked him to be sure to have the dermatologist look at the right lower extremity as well as other areas.  OT instructed patient to attempt to don 30-40 mmHg stockings at home.  If he has difficulty with them, to continue wearing the 20-30 mmHg and return for next treatment session in 1 month in order to assess ability to maintain reduction.  He is agreeable  Given: Symptoms/condition management  Program: New, Reinforced  How Provided: Verbal, Demonstration  Provided to: Patient  Level of Understanding: Verbalized  89235 - OT Self Care/Mgmt Minutes: 15      Manual Rx (Last 36 Hours)       Manual Treatments       Row Name 03/25/25 1438             Total Minutes    98645 - OT Manual Therapy Minutes 25  -MP                 User Key  (r) = Recorded By, (t) = Taken By, (c) = Cosigned By      Initials Name Provider Type    Chhaya Cabezas, OT Occupational Therapist                   OT Goals       Row Name 03/25/25 1438          Time Calculation    OT Goal Re-Cert Due Date 04/24/25  -MP               User Key  (r) = Recorded By, (t) = Taken By, (c) = Cosigned By      Initials Name Provider Type    MP Chhaya Brunner OT Occupational Therapist                     OT Assessment/Plan       Row Name 03/25/25 1016          OT Assessment    Functional Limitations Limitations in community activities;Performance in leisure activities;Other (comment)  -MP     Impairments Balance;Edema;Impaired lymphatic circulation;Joint mobility;Locomotion;Pain;Range of motion;Sensation  -MP     Assessment Comments Toñito is currently using 20-30 mmHg compression garments. He demonstrates a 2% increase in lymph volume at the right LE from baseline score, but has an 8% reduction from baseline. The left demonstrates a further reduction from previous measurement.  Patient is agreeable to attempting to utilize 30-40 mmHg compression garments and if unable, he is agreeable to continue wearing 20-30 until next treatment session.  He will be reassessed for ability to maintain reduction at that time and may be appropriate to order permanent compression garments.  -MP     OT Diagnosis Lymphedema  -MP     OT Rehab Potential Excellent  -MP     Patient/caregiver participated in establishment of treatment plan and goals Yes  -MP     Patient would benefit from skilled therapy intervention Yes  -MP        OT Plan    OT Frequency Other (comment)  Reassess in 1 month  -MP     Planned CPT's? OT RE-EVAL: 97870;OT THER PROC EA 15 MIN: 77638PG;OT SELF CARE/MGMT/TRAIN 15 MIN: 74721;OT MANUAL THERAPY EA 15 MIN: 95353;OT ORTHOTIC MGMT/TRAIN EA 15 MIN: 03839  -MP     Planned Therapy Interventions (Optional Details) home exercise program;joint mobilization;manual therapy  techniques;orthotic fitting/training;patient/family education;ROM (Range of Motion);strengthening  -MP     OT Plan Comments Continue POC  -MP               User Key  (r) = Recorded By, (t) = Taken By, (c) = Cosigned By      Initials Name Provider Type    Chhaya Cabezas OT Occupational Therapist                      Goals:  1. Uncontrolled lymphedema of bilateral lower extremity/lower quadrant.  LTG1:  90 days:  Volumetric measurements of bilateral lower extremity/lower quadrant will be decreased by 10% or until plateau in reduction is achieved to improve functional mobility.             STATUS: Partially met.  Ongoing              STG1a:  30 days:  Volumetric measurements of bilateral lower extremity/lower quadrant will be decreased by 5% bilaterally to improve functional mobility.    STATUS: Met  TREATMENT:  Manual Therapy, Therapeutic exercise, ADL/self-care therapy, Bioimpedence Fluid Analysis, Orthotic management and training, Therapeutic Activity, wound dressing as needed, Modalities: TENS, NMES, Ultrasound, Fluidotherapy, , and Patient and family/caregiver education.     2. Patient with decreased ADL/Self-Care routine for lymphedema management.              LTG 2:  90 days:  Patient/caregiver will independently verbalize/demonstrate ADL/Self-Care routine for lymphedema management.                      STATUS: Met              STG 2a:  30 days:  Patient/caregiver will independently perform or verbally dictate compression wrapping technique of the lower extremity/lower quadrant.                      STATUS: Not met.  Discontinue              STG2b:  30 days:  Patient will independently verbalize signs and symptoms of infection.                            STATUS: Met.  Ongoing              STG2c:  30 days:  Patient will independently demonstrate/verbalize proper skin care routine to prevent infection and or wound development.                       STATUS: Met.  Ongoing  STG2d:  30 days:  Patient will  independently perform teach back of HEP routine.                      STATUS: Met.  STG2e:  30 days:  Patient/caregiver will obtain properly fitting compression orthosis, will demonstrate don/doff skill of compression orthosis with modified independence, and independently verbalize wear schedule.          STATUS: Not met.  Ongoing  STG2f: 30 days: Patient/caregiver will independently perform self-manual lymphatic drainage of the lower extremity/lower quadrant.          STATUS: Met.  TREATMENT:  Therapeutic Activity, Patient/Caregiver Education, ADL retraining, Manual Therapy, Orthotic Management and training, Modalities: TENS, NMES, Ultrasound, Fluidotherapy Wound dressing if necessary,  Therapeutic Exercise, Modalities                 3. Self-Care Limitations                                    LTG 3: 90 days:  The patient will demonstrate improved quality of life by achieving a score of 32 or less on the Lymphedema Life Impact Scale.                      STATUS: Met.              STG 3a: 30 days:  The patient will demonstrate improved quality of life by achieving a score of 35  on the Lymphedema Life Impact Scale.                      STATUS: Met.  TREATMENT:  Manual therapy, therapeutic exercise, therapeutic activity, ADL retraining, Patient/caregiver education, Modalities: TENS, NMES, Ultrasound, Fluidotherapy Orthotic Management and Training, Wound dressing as needed.    Time Calculation:   Timed Charges  23341 - OT Manual Therapy Minutes: 25  43379 - OT Self Care/Mgmt Minutes: 15  Total Minutes  Timed Charges Total Minutes: 40   Total Minutes: 40     Therapy Charges for Today       Code Description Service Date Service Provider Modifiers Qty    [id number]  OT MANUAL THERAPY EA 15 MIN 3/25/2025 Chhaya Brunner OT GO 2    [id number]  OT SELF CARE/MGMT/TRAIN EA 15 MIN 3/25/2025 Chhaya Brunner OT GO 1              Chhaya Brunner OT  3/25/2025

## 2025-04-04 ENCOUNTER — TRANSCRIBE ORDERS (OUTPATIENT)
Dept: ADMINISTRATIVE | Facility: HOSPITAL | Age: 75
End: 2025-04-04
Payer: MEDICARE

## 2025-04-04 ENCOUNTER — LAB (OUTPATIENT)
Facility: HOSPITAL | Age: 75
End: 2025-04-04
Payer: MEDICARE

## 2025-04-04 DIAGNOSIS — N18.30 STAGE 3 CHRONIC KIDNEY DISEASE, UNSPECIFIED WHETHER STAGE 3A OR 3B CKD: Primary | ICD-10-CM

## 2025-04-04 DIAGNOSIS — N18.30 STAGE 3 CHRONIC KIDNEY DISEASE, UNSPECIFIED WHETHER STAGE 3A OR 3B CKD: ICD-10-CM

## 2025-04-04 LAB
ALBUMIN SERPL-MCNC: 4.3 G/DL (ref 3.5–5.2)
ANION GAP SERPL CALCULATED.3IONS-SCNC: 9.6 MMOL/L (ref 5–15)
BASOPHILS # BLD AUTO: 0.07 10*3/MM3 (ref 0–0.2)
BASOPHILS NFR BLD AUTO: 0.9 % (ref 0–1.5)
BILIRUB UR QL STRIP: NEGATIVE
BUN SERPL-MCNC: 34 MG/DL (ref 8–23)
BUN/CREAT SERPL: 23.9 (ref 7–25)
CALCIUM SPEC-SCNC: 9.8 MG/DL (ref 8.6–10.5)
CHLORIDE SERPL-SCNC: 103 MMOL/L (ref 98–107)
CLARITY UR: CLEAR
CO2 SERPL-SCNC: 23.4 MMOL/L (ref 22–29)
COLOR UR: YELLOW
CREAT SERPL-MCNC: 1.42 MG/DL (ref 0.76–1.27)
CREAT UR-MCNC: 51.2 MG/DL
DEPRECATED RDW RBC AUTO: 43.4 FL (ref 37–54)
EGFRCR SERPLBLD CKD-EPI 2021: 51.2 ML/MIN/1.73
EOSINOPHIL # BLD AUTO: 0.31 10*3/MM3 (ref 0–0.4)
EOSINOPHIL NFR BLD AUTO: 4 % (ref 0.3–6.2)
ERYTHROCYTE [DISTWIDTH] IN BLOOD BY AUTOMATED COUNT: 12.8 % (ref 12.3–15.4)
GLUCOSE SERPL-MCNC: 107 MG/DL (ref 65–99)
GLUCOSE UR STRIP-MCNC: NEGATIVE MG/DL
HCT VFR BLD AUTO: 35.2 % (ref 37.5–51)
HGB BLD-MCNC: 11.2 G/DL (ref 13–17.7)
HGB UR QL STRIP.AUTO: NEGATIVE
IMM GRANULOCYTES # BLD AUTO: 0.02 10*3/MM3 (ref 0–0.05)
IMM GRANULOCYTES NFR BLD AUTO: 0.3 % (ref 0–0.5)
KETONES UR QL STRIP: NEGATIVE
LEUKOCYTE ESTERASE UR QL STRIP.AUTO: NEGATIVE
LYMPHOCYTES # BLD AUTO: 1.3 10*3/MM3 (ref 0.7–3.1)
LYMPHOCYTES NFR BLD AUTO: 16.8 % (ref 19.6–45.3)
MCH RBC QN AUTO: 29.8 PG (ref 26.6–33)
MCHC RBC AUTO-ENTMCNC: 31.8 G/DL (ref 31.5–35.7)
MCV RBC AUTO: 93.6 FL (ref 79–97)
MONOCYTES # BLD AUTO: 0.75 10*3/MM3 (ref 0.1–0.9)
MONOCYTES NFR BLD AUTO: 9.7 % (ref 5–12)
NEUTROPHILS NFR BLD AUTO: 5.29 10*3/MM3 (ref 1.7–7)
NEUTROPHILS NFR BLD AUTO: 68.3 % (ref 42.7–76)
NITRITE UR QL STRIP: NEGATIVE
NRBC BLD AUTO-RTO: 0 /100 WBC (ref 0–0.2)
PH UR STRIP.AUTO: 6.5 [PH] (ref 5–8)
PHOSPHATE SERPL-MCNC: 3.2 MG/DL (ref 2.5–4.5)
PLATELET # BLD AUTO: 292 10*3/MM3 (ref 140–450)
PMV BLD AUTO: 9.8 FL (ref 6–12)
POTASSIUM SERPL-SCNC: 5.1 MMOL/L (ref 3.5–5.2)
PROT ?TM UR-MCNC: 17.6 MG/DL
PROT UR QL STRIP: ABNORMAL
PROT/CREAT UR: 0.34 MG/G{CREAT}
PTH-INTACT SERPL-MCNC: 57 PG/ML (ref 15–65)
RBC # BLD AUTO: 3.76 10*6/MM3 (ref 4.14–5.8)
SODIUM SERPL-SCNC: 136 MMOL/L (ref 136–145)
SP GR UR STRIP: 1.01 (ref 1–1.03)
UROBILINOGEN UR QL STRIP: ABNORMAL
WBC NRBC COR # BLD AUTO: 7.74 10*3/MM3 (ref 3.4–10.8)

## 2025-04-04 PROCEDURE — 80069 RENAL FUNCTION PANEL: CPT

## 2025-04-04 PROCEDURE — 36415 COLL VENOUS BLD VENIPUNCTURE: CPT

## 2025-04-04 PROCEDURE — 83970 ASSAY OF PARATHORMONE: CPT

## 2025-04-04 PROCEDURE — 84156 ASSAY OF PROTEIN URINE: CPT

## 2025-04-04 PROCEDURE — 85025 COMPLETE CBC W/AUTO DIFF WBC: CPT

## 2025-04-04 PROCEDURE — 81003 URINALYSIS AUTO W/O SCOPE: CPT

## 2025-04-04 PROCEDURE — 82570 ASSAY OF URINE CREATININE: CPT

## 2025-04-08 ENCOUNTER — TELEPHONE (OUTPATIENT)
Dept: OCCUPATIONAL THERAPY | Facility: HOSPITAL | Age: 75
End: 2025-04-08
Payer: MEDICARE

## 2025-04-08 DIAGNOSIS — N18.31 CHRONIC KIDNEY DISEASE (CKD) STAGE G3A/A1, MODERATELY DECREASED GLOMERULAR FILTRATION RATE (GFR) BETWEEN 45-59 ML/MIN/1.73 SQUARE METER AND ALBUMINURIA CREATININE RATIO LESS THAN 30 MG/G (CMS/H*: ICD-10-CM

## 2025-04-08 DIAGNOSIS — I89.0 LYMPHEDEMA: Primary | ICD-10-CM

## 2025-04-08 DIAGNOSIS — I25.10 CORONARY ARTERY DISEASE INVOLVING NATIVE CORONARY ARTERY OF NATIVE HEART WITHOUT ANGINA PECTORIS: ICD-10-CM

## 2025-04-08 DIAGNOSIS — E13.9 DIABETES 1.5, MANAGED AS TYPE 2: ICD-10-CM

## 2025-04-08 NOTE — TELEPHONE ENCOUNTER
Please sign Rx in order for pt to receive permanent compression garments to manage lymphedema independently. Thank you  
Followed protocol/ancef per SCIP

## 2025-04-23 ENCOUNTER — HOSPITAL ENCOUNTER (OUTPATIENT)
Dept: OCCUPATIONAL THERAPY | Facility: HOSPITAL | Age: 75
Setting detail: THERAPIES SERIES
Discharge: HOME | End: 2025-04-23
Payer: MEDICARE

## 2025-04-23 DIAGNOSIS — N18.31 CHRONIC KIDNEY DISEASE (CKD) STAGE G3A/A1, MODERATELY DECREASED GLOMERULAR FILTRATION RATE (GFR) BETWEEN 45-59 ML/MIN/1.73 SQUARE METER AND ALBUMINURIA CREATININE RATIO LESS THAN 30 MG/G (CMS/H*: ICD-10-CM

## 2025-04-23 DIAGNOSIS — I89.0 LYMPHEDEMA: Primary | ICD-10-CM

## 2025-04-23 DIAGNOSIS — E13.9 DIABETES 1.5, MANAGED AS TYPE 2: ICD-10-CM

## 2025-04-23 DIAGNOSIS — I25.10 CORONARY ARTERY DISEASE INVOLVING NATIVE CORONARY ARTERY OF NATIVE HEART WITHOUT ANGINA PECTORIS: ICD-10-CM

## 2025-04-23 PROCEDURE — 97140 MANUAL THERAPY 1/> REGIONS: CPT

## 2025-04-23 PROCEDURE — 97535 SELF CARE MNGMENT TRAINING: CPT

## 2025-04-23 NOTE — THERAPY RE-EVALUATION
Outpatient Occupational Therapy Lymphedema Re-Evaluation/Discharge   KHANG Chaparro     Patient Name: Toñito Kuo  : 1950  MRN: [id number]  Today's Date: 2025      Visit Date: 2025    Patient Active Problem List   Diagnosis    Coronary artery disease involving native coronary artery of native heart without angina pectoris    Hyperlipemia    Essential hypertension    Paroxysmal atrial fibrillation    Pedal edema    Stage 3a chronic kidney disease    Anemia    RAUL on CPAP    Type 2 diabetes mellitus with hyperglycemia, without long-term current use of insulin    Class 1 obesity    OA (osteoarthritis) of hip    Postoperative anemia due to acute blood loss    Dizziness    Screening for malignant neoplasm of colon    Family history of colon cancer        Past Medical History:   Diagnosis Date    Bladder disease     CAD (coronary artery disease) 2016    hx     Cancer     SKIN CANCER BASAL LEFT EYE AND EAR  AND SQAMOUS CELL RIGHT SIDE TORSO    Chest pain     DENIES ANY RECENT CP/SOA. FOLLOWED BY DR MARCELINO AND TRANSITION TO DR WHIPPLE. REPORTS HAS 35 [address] OF LAND AND WORKS ON IT    Coronary atherosclerosis of native coronary vessel 2015    Diabetes mellitus     type 2. DOES NOT CHECK BS DAILY    GERD (gastroesophageal reflux disease)     Hard of hearing     HEARS BETTER OUT OF LEFT EAR. BILATERAL HEARING AID    Heart attack     HTN (hypertension)     Hyperlipemia     Osteoarthritis     BILATERAL HIPS    Paroxysmal atrial fibrillation 2021    Seasonal allergies     Sleep apnea     over 10 yrs ago    Stage 3a chronic kidney disease 2021        Past Surgical History:   Procedure Laterality Date    APPENDECTOMY          COLONOSCOPY      diverticulosis    COLONOSCOPY N/A 2024    Procedure: COLONOSCOPY;  Surgeon: Justin Burris MD;  Location: [facility] ENDOSCOPY;  Service: General;  Laterality: N/A;  DIVERTICULOSIS    CORONARY ARTERY BYPASS GRAFT      2011    HERNIA REPAIR       SKIN CANCER EXCISION Right     TESTICLE SURGERY  1974    TOTAL HIP ARTHROPLASTY Right 10/03/2022    Procedure: TOTAL HIP ARTHROPLASTY ANTERIOR RIGHT;  Surgeon: Ravinder Hawley MD;  Location: [facility];  Service: Orthopedics;  Laterality: Right;     Visit Dx:     ICD-10-CM ICD-9-CM   1. Lymphedema  I89.0 457.1   2. Chronic kidney disease (CKD) stage G3a/A1, moderately decreased glomerular filtration rate (GFR) between 45-59 mL/min/1.73 square meter and albuminuria creatinine ratio less than 30 mg/g (CMS/H*  N18.31 585.3   3. 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 Coronary artery disease involving native coronary artery of native heart without angina pectoris  I25.10 414.01        Patient History       Row Name 04/23/25 1200             History    Chief Complaint Swelling;Pain;Tightness;Tinglings  -MP      Type of Pain Lower Extremity / Leg  -MP      Date Current Problem(s) Began 01/02/19  -MP      Brief Description of Current Complaint Patient reports history of varicose vein concerns for about 15 years with Sclerotherapy about 15 years ago.  He reports that the swelling in his legs of approximately 10 years duration, and it has become more noticable in the last 5 years.  He has tried elevation, and compression. He is returning from a 2 month trip today.  -MP      Previous treatment for THIS PROBLEM --  compression and elevation  -MP      Patient/Caregiver Goals Decrease swelling  -MP      Current Tobacco Use no  -MP      Smoking Status no  -MP      Patient's Rating of General Health Good  -MP      Hand Dominance ambidextrous  -MP      Occupation/sports/leisure activities Retired  -MP      Patient seeing anyone else for problem(s)? no  -MP      How has patient tried to help current problem? elevation and compression  -MP         Fall Risk Assessment    Any falls in the past year: Yes  -MP      Number of falls reported in the last 12 months 3  -MP      Factors that contributed to the fall: Tripped   "-MP      Does patient have a fear of falling No  -MP         Services    Prior Rehab/Home Health Experiences No  -MP      Are you currently receiving Home Health services No  -MP      Do you plan to receive Home Health services in the near future No  -MP         Daily Activities    Primary Language English  -MP      Are you able to read Yes  -MP      Are you able to write Yes  -MP      How does patient learn best? Reading;Demonstration  -MP      Teaching needs identified Home Exercise Program;Management of Condition;Falls Prevention  -MP      Barriers to learning None  -MP      Pt Participated in POC and Goals Yes  -MP         Safety    Are you being hurt, hit, or frightened by anyone at home or in your life? No  -MP      Are you being neglected by a caregiver No  -MP      Have you had any of the following issues with N/A  -MP                User Key  (r) = Recorded By, (t) = Taken By, (c) = Cosigned By      Initials Name Provider Type    Chhaya Cabezas OT Occupational Therapist                     Lymphedema       Row Name 04/23/25 1300 04/23/25 1200          Subjective Pain    Able to rate subjective pain? -- yes  -MP     Pre-Treatment Pain Level -- 3  -MP     Post-Treatment Pain Level -- 3  -MP     Subjective Pain Comment -- Toñito denies pain.  He states his feet hurt.  He is scheduled to see Dr. Arthur.  -MP        LLIS - Physical Concerns    The amount of pain associated with my lymphedema is: -- 2  -MP     The amount of limb heaviness associated with my lymphedema is: -- 0  -MP     The amount of skin tightness associated with my lymphedema is: -- 0  -MP     The size of my swollen limb(s) seems: -- 0  -MP     Lymphedema affects the movement of my swollen limb(s): -- 0  -MP     The strength in my swollen limb(s) is: -- 2  -MP        LLIS - Psychosocial Concerns    Lymphedema affects my body image (i.e., \"how I think I look\"). -- 0  -MP     Lymphedema affects my socializing with others. -- 0  -MP     " "Lymphedema affects my intimate relations with spouse or partner (rate 0 if not applicable -- 0  -MP     Lymphedema \"gets me down\" (i.e., depression, frustration, or anger) -- 0  -MP     I must rely on others for help due to my lymphedema. -- 0  -MP     I know what to do to manage my lymphedema -- 0  -MP        LLIS - Functional Concerns    Lymphedema affects my ability to perform self-care activities (i.e. eating, dressing, hygiene) -- 0  -MP     Lymphedema affects my ability to perform routine home or work-related activities. -- 0  -MP     Lymphedema affects my performance of preferred leisure activities. -- 0  -MP     Lymphedema affects proper fit of clothing/shoes -- 0  -MP     Lymphedema affects my sleep -- 0  -MP        Lymphedema Edema Assessment    Stemmer Sign bilateral:;negative  -MP --     Burke Hump bilateral:;negative  -MP --        Skin Changes/Observations    Location/Assessment Lower Extremity  -MP --     Lower Extremity Conditions bilateral:;intact;clean;hairless  -MP --     Lower Extremity Color/Pigment bilateral:;erythema;hyperpigmented  -MP --        Lymphedema Sensation    Lymphedema Sensation Reports tingling  -MP --        Lymphedema Pulses/Capillary Refill    Lymphedema Pulses/Capillary Refill capillary refill  -MP --     Capillary Refill lower extremity capillary refill  -MP --     Lower Extremity Capillary Refill less than 3 seconds  -MP --        Lymphedema Measurements    Measurement Type(s) Circumferential  -MP --     Circumferential Areas Lower extremities  -MP --        Compression/Skin Care    Compression/Skin Care compression garment  -MP --     Compression Garment Comments 20-30 mmHG compression garments-mediven plus standard, xwide calf, size III, 20-30 mmHg; has 30-40 mmHg compression was recommended and is at home.  -MP --        Lymphedema Life Impact Scale Totals    A.  Total Q1 - Q17 (Do not include Q18) -- 4  -MP     B.  Total number of questions answered (Q1-Q17) -- 17  -MP "     C. Divide A by B -- 0.24  -MP     D. Multiple C by 25 -- 6  -MP               User Key  (r) = Recorded By, (t) = Taken By, (c) = Cosigned By      Initials Name Provider Type    Chhaya Cabezas OT Occupational Therapist                    Circumferential Measurements:      Baseline: R: 3267.18 ml L: 3544.75 ml  % volume change: R: -9%   L: -17%  Note: 1 measurement taken above the knee       Therapy Education  Education Details: Toñito was wearing stockings of 20-30 mmHg today, which were noted to have runners in them.  Occupational therapist instructed him that they would be unable to maintain compression due to damage to them in addition to lower compression level.  He was asked to utilize the 30-40 mmHg compression garments that he currently does have at home.  He is agreeable.  He is asked to reorder at 6 months in order to maintain reduction appropriately.  Given: Symptoms/condition management  Program: New, Reinforced  Level of Understanding: Verbalized  88979 - OT Self Care/Mgmt Minutes: 35      Manual Rx (Last 36 Hours)       Manual Treatments       Row Name 04/23/25 1626             Total Minutes    43562 - OT Manual Therapy Minutes 10  -MP                User Key  (r) = Recorded By, (t) = Taken By, (c) = Cosigned By      Initials Name Provider Type    Chhaya Cabezas OT Occupational Therapist                       OT Assessment/Plan       Row Name 04/23/25 1310          OT Assessment    Functional Limitations Other (comment)  -MP     Assessment Comments Toñito demonstrates good application of compression stockings.  He does not currently demonstrate an increase in limb volume from previous measurements, but it is recommended that he wear the 30-40 mmHg compression stockings versus the ones he arrived in today which were 20-30 mmHg in order to maintain reduction.  He is agreeable.  He has met all goals set by Occupational Therapy and is appropriate for discharge at this time.  -MP     OT Diagnosis  Lymphedema  -MP     OT Rehab Potential --  N/A  -MP     Patient/caregiver participated in establishment of treatment plan and goals --  N/A  -MP        OT Plan    OT Frequency --  N/A  -MP     Predicted Duration of Therapy Intervention (OT) N/A.  Discharge on this date  -MP     OT Plan Comments N/A  -MP               User Key  (r) = Recorded By, (t) = Taken By, (c) = Cosigned By      Initials Name Provider Type    Chhaya Cabezas OT Occupational Therapist                    Goals:  1. Uncontrolled lymphedema of bilateral lower extremity/lower quadrant.  LTG1:  90 days:  Volumetric measurements of bilateral lower extremity/lower quadrant will be decreased by 10% or until plateau in reduction is achieved to improve functional mobility.             STATUS: Met              STG1a:  30 days:  Volumetric measurements of bilateral lower extremity/lower quadrant will be decreased by 5% bilaterally to improve functional mobility.    STATUS:  met  TREATMENT:  Manual Therapy, Therapeutic exercise, ADL/self-care therapy, Bioimpedence Fluid Analysis, Orthotic management and training, Therapeutic Activity, wound dressing as needed, Modalities: TENS, NMES, Ultrasound, Fluidotherapy, , and Patient and family/caregiver education.     2. Patient with decreased ADL/Self-Care routine for lymphedema management.              LTG 2:  90 days:  Patient/caregiver will independently verbalize/demonstrate ADL/Self-Care routine for lymphedema management.                      STATUS:  Met              STG 2a:  30 days:  Patient/caregiver will independently perform or verbally dictate compression wrapping technique of the lower extremity/lower quadrant.                      STATUS: Discontinue               STG2b:  30 days:  Patient will independently verbalize signs and symptoms of infection.                            STATUS:  met.               STG2c:  30 days:  Patient will independently demonstrate/verbalize proper skin care routine to  prevent infection and or wound development.                       STATUS:  met.   STG2d:  30 days:  Patient will independently perform teach back of HEP routine.                      STATUS: met.   STG2e:  30 days:  Patient/caregiver will obtain properly fitting compression orthosis, will demonstrate don/doff skill of compression orthosis with modified independence, and independently verbalize wear schedule.          STATUS: met.      STG2f: 30 days: Patient/caregiver will independently perform self-manual lymphatic drainage of the lower extremity/lower quadrant.          STATUS: met.  TREATMENT:  Therapeutic Activity, Patient/Caregiver Education, ADL retraining, Manual Therapy, Orthotic Management and training, Modalities: TENS, NMES, Ultrasound, Fluidotherapy Wound dressing if necessary,  Therapeutic Exercise, Modalities                 3. Self-Care Limitations                                    LTG 3: 90 days:  The patient will demonstrate improved quality of life by achieving a score of 32 or less on the Lymphedema Life Impact Scale.                      STATUS: met.               STG 3a: 30 days:  The patient will demonstrate improved quality of life by achieving a score of 35  on the Lymphedema Life Impact Scale.                      STATUS:  met.   TREATMENT:  Manual therapy, therapeutic exercise, therapeutic activity, ADL retraining, Patient/caregiver education, Modalities: TENS, NMES, Ultrasound, Fluidotherapy Orthotic Management and Training, Wound dressing as needed.      04/23/25 1635   OP OT Discharge Summary   Date of Discharge 04/23/25   Reason for Discharge All goals achieved   Outcomes Achieved Able to achieve all goals within established timeline   Discharge Destination Home without follow-up     Time Calculation:   Timed Charges  50824 - OT Self Care/Mgmt Minutes: 13  Total Minutes  Timed Charges Total Minutes: 13   Total Minutes: 13      Therapy Charges for Today       Code Description Service  Date Service Provider Modifiers Qty    91057702044 HC OT MANUAL THERAPY EA 15 MIN 4/23/2025 Chhaya Brunner OT GO 1    32006894222 HC OT SELF CARE/MGMT/TRAIN EA 15 MIN 4/23/2025 Chhaya Brunner OT GO 2            Chhaya Brunner OT  4/23/2025

## 2025-05-12 DIAGNOSIS — N18.31 STAGE 3A CHRONIC KIDNEY DISEASE: ICD-10-CM

## 2025-05-12 RX ORDER — SPIRONOLACTONE 25 MG/1
25 TABLET ORAL EVERY OTHER DAY
Qty: 45 TABLET | Refills: 3 | Status: SHIPPED | OUTPATIENT
Start: 2025-05-12

## 2025-05-12 NOTE — TELEPHONE ENCOUNTER
Meets protocol;      Diuretics Protocol [id number]/12/2025 12:36 PM    Normal serum potassium in past 12 months    Normal serum sodium in past 12 months    Recent or future visit with authorizing provider    Blood pressure on record    GFR> 30 ml/min in past year        Matches last ov 10/14/24    Next ov 8/4/25

## 2025-06-02 RX ORDER — METOPROLOL SUCCINATE 50 MG/1
50 TABLET, EXTENDED RELEASE ORAL DAILY
Qty: 90 TABLET | Refills: 3 | Status: SHIPPED | OUTPATIENT
Start: 2025-06-02

## 2025-06-11 ENCOUNTER — OFFICE VISIT (OUTPATIENT)
Dept: INTERNAL MEDICINE | Facility: CLINIC | Age: 75
End: 2025-06-11
Payer: MEDICARE

## 2025-06-11 VITALS
WEIGHT: 255.8 LBS | DIASTOLIC BLOOD PRESSURE: 72 MMHG | SYSTOLIC BLOOD PRESSURE: 128 MMHG | HEART RATE: 57 BPM | BODY MASS INDEX: 34.69 KG/M2 | RESPIRATION RATE: 16 BRPM | OXYGEN SATURATION: 98 % | TEMPERATURE: 96.9 F

## 2025-06-11 DIAGNOSIS — E11.65 TYPE 2 DIABETES MELLITUS WITH HYPERGLYCEMIA, WITHOUT LONG-TERM CURRENT USE OF INSULIN: Primary | ICD-10-CM

## 2025-06-11 DIAGNOSIS — M79.89 LEG SWELLING: ICD-10-CM

## 2025-06-11 DIAGNOSIS — N18.31 STAGE 3A CHRONIC KIDNEY DISEASE: Chronic | ICD-10-CM

## 2025-06-11 DIAGNOSIS — I10 PRIMARY HYPERTENSION: ICD-10-CM

## 2025-06-11 LAB
ALBUMIN SERPL-MCNC: 4.4 G/DL (ref 3.5–5.2)
ALBUMIN UR-MCNC: 6.3 MG/DL
ALBUMIN/GLOB SERPL: 1.3 G/DL
ALP SERPL-CCNC: 61 U/L (ref 39–117)
ALT SERPL W P-5'-P-CCNC: 13 U/L (ref 1–41)
ANION GAP SERPL CALCULATED.3IONS-SCNC: 10.7 MMOL/L (ref 5–15)
AST SERPL-CCNC: 21 U/L (ref 1–40)
BASOPHILS # BLD AUTO: 0.07 10*3/MM3 (ref 0–0.2)
BASOPHILS NFR BLD AUTO: 0.8 % (ref 0–1.5)
BILIRUB SERPL-MCNC: <0.2 MG/DL (ref 0–1.2)
BUN SERPL-MCNC: 36 MG/DL (ref 8–23)
BUN/CREAT SERPL: 20.1 (ref 7–25)
CALCIUM SPEC-SCNC: 10.3 MG/DL (ref 8.6–10.5)
CHLORIDE SERPL-SCNC: 104 MMOL/L (ref 98–107)
CHOLEST SERPL-MCNC: 120 MG/DL (ref 0–200)
CO2 SERPL-SCNC: 25.3 MMOL/L (ref 22–29)
CREAT SERPL-MCNC: 1.79 MG/DL (ref 0.76–1.27)
CREAT UR-MCNC: 100 MG/DL
DEPRECATED RDW RBC AUTO: 42.7 FL (ref 37–54)
EGFRCR SERPLBLD CKD-EPI 2021: 38.8 ML/MIN/1.73
EOSINOPHIL # BLD AUTO: 0.38 10*3/MM3 (ref 0–0.4)
EOSINOPHIL NFR BLD AUTO: 4.6 % (ref 0.3–6.2)
ERYTHROCYTE [DISTWIDTH] IN BLOOD BY AUTOMATED COUNT: 12.4 % (ref 12.3–15.4)
GLOBULIN UR ELPH-MCNC: 3.4 GM/DL
GLUCOSE SERPL-MCNC: 110 MG/DL (ref 65–99)
HBA1C MFR BLD: 6.3 % (ref 4.8–5.6)
HCT VFR BLD AUTO: 35.3 % (ref 37.5–51)
HDLC SERPL-MCNC: 48 MG/DL (ref 40–60)
HGB BLD-MCNC: 11.3 G/DL (ref 13–17.7)
IMM GRANULOCYTES # BLD AUTO: 0.03 10*3/MM3 (ref 0–0.05)
IMM GRANULOCYTES NFR BLD AUTO: 0.4 % (ref 0–0.5)
LDLC SERPL CALC-MCNC: 56 MG/DL (ref 0–100)
LDLC/HDLC SERPL: 1.15 {RATIO}
LYMPHOCYTES # BLD AUTO: 1 10*3/MM3 (ref 0.7–3.1)
LYMPHOCYTES NFR BLD AUTO: 12.1 % (ref 19.6–45.3)
MCH RBC QN AUTO: 30.1 PG (ref 26.6–33)
MCHC RBC AUTO-ENTMCNC: 32 G/DL (ref 31.5–35.7)
MCV RBC AUTO: 94.1 FL (ref 79–97)
MICROALBUMIN/CREAT UR: 63 MG/G (ref 0–29)
MONOCYTES # BLD AUTO: 0.76 10*3/MM3 (ref 0.1–0.9)
MONOCYTES NFR BLD AUTO: 9.2 % (ref 5–12)
NEUTROPHILS NFR BLD AUTO: 6.05 10*3/MM3 (ref 1.7–7)
NEUTROPHILS NFR BLD AUTO: 72.9 % (ref 42.7–76)
NRBC BLD AUTO-RTO: 0 /100 WBC (ref 0–0.2)
PLATELET # BLD AUTO: 310 10*3/MM3 (ref 140–450)
PMV BLD AUTO: 9.6 FL (ref 6–12)
POTASSIUM SERPL-SCNC: 5 MMOL/L (ref 3.5–5.2)
PROT SERPL-MCNC: 7.8 G/DL (ref 6–8.5)
RBC # BLD AUTO: 3.75 10*6/MM3 (ref 4.14–5.8)
SODIUM SERPL-SCNC: 140 MMOL/L (ref 136–145)
TRIGL SERPL-MCNC: 83 MG/DL (ref 0–150)
TSH SERPL DL<=0.05 MIU/L-ACNC: 2.27 UIU/ML (ref 0.27–4.2)
VLDLC SERPL-MCNC: 16 MG/DL (ref 5–40)
WBC NRBC COR # BLD AUTO: 8.29 10*3/MM3 (ref 3.4–10.8)

## 2025-06-11 PROCEDURE — 1126F AMNT PAIN NOTED NONE PRSNT: CPT | Performed by: STUDENT IN AN ORGANIZED HEALTH CARE EDUCATION/TRAINING PROGRAM

## 2025-06-11 PROCEDURE — 83036 HEMOGLOBIN GLYCOSYLATED A1C: CPT | Performed by: STUDENT IN AN ORGANIZED HEALTH CARE EDUCATION/TRAINING PROGRAM

## 2025-06-11 PROCEDURE — 80061 LIPID PANEL: CPT | Performed by: STUDENT IN AN ORGANIZED HEALTH CARE EDUCATION/TRAINING PROGRAM

## 2025-06-11 PROCEDURE — 85025 COMPLETE CBC W/AUTO DIFF WBC: CPT | Performed by: STUDENT IN AN ORGANIZED HEALTH CARE EDUCATION/TRAINING PROGRAM

## 2025-06-11 PROCEDURE — 82043 UR ALBUMIN QUANTITATIVE: CPT | Performed by: STUDENT IN AN ORGANIZED HEALTH CARE EDUCATION/TRAINING PROGRAM

## 2025-06-11 PROCEDURE — 84443 ASSAY THYROID STIM HORMONE: CPT | Performed by: STUDENT IN AN ORGANIZED HEALTH CARE EDUCATION/TRAINING PROGRAM

## 2025-06-11 PROCEDURE — 36415 COLL VENOUS BLD VENIPUNCTURE: CPT | Performed by: STUDENT IN AN ORGANIZED HEALTH CARE EDUCATION/TRAINING PROGRAM

## 2025-06-11 PROCEDURE — 80053 COMPREHEN METABOLIC PANEL: CPT | Performed by: STUDENT IN AN ORGANIZED HEALTH CARE EDUCATION/TRAINING PROGRAM

## 2025-06-11 PROCEDURE — 82570 ASSAY OF URINE CREATININE: CPT | Performed by: STUDENT IN AN ORGANIZED HEALTH CARE EDUCATION/TRAINING PROGRAM

## 2025-06-11 PROCEDURE — 3078F DIAST BP <80 MM HG: CPT | Performed by: STUDENT IN AN ORGANIZED HEALTH CARE EDUCATION/TRAINING PROGRAM

## 2025-06-11 PROCEDURE — 3074F SYST BP LT 130 MM HG: CPT | Performed by: STUDENT IN AN ORGANIZED HEALTH CARE EDUCATION/TRAINING PROGRAM

## 2025-06-11 PROCEDURE — 99214 OFFICE O/P EST MOD 30 MIN: CPT | Performed by: STUDENT IN AN ORGANIZED HEALTH CARE EDUCATION/TRAINING PROGRAM

## 2025-06-11 RX ORDER — SEMAGLUTIDE 0.68 MG/ML
INJECTION, SOLUTION SUBCUTANEOUS
Qty: 3 ML | Refills: 1 | Status: SHIPPED | OUTPATIENT
Start: 2025-06-11

## 2025-06-11 NOTE — PROGRESS NOTES
Chief Complaint  Diabetes (6 month follow up, wondering about Ozempic to help with diabetes and kidney function. )    Subjective            Toñito Kuo presents to [facility]  History of Present Illness    Type 2 diabetes:   Chronic, last A1c was 6.0 in oct 2024.   Pt with question regarding ozempic.   States his son-in-law's  brother in law was started on a similar medication and has lost a lot of weight.   Pt states he has lost some weight since his last visit.   He is now down 7lbs from his last visit.    CKD stage 3a:  Hypertension :  Chronic, following with nephrology.  Pt last seen in April 2025. Kidney function improved w/ cessation of diclofenac tablets.  Pt previously on amlodipine, last documented in Dec 2024, and he is no longer taking this and bp is doing well ranging from 125-130/60's.     Bilateral leg swelling:  Pt seen at lymphedema clinic and states this has been helpful. He is wearing compression stockings.     Chronic arthritis:  States since stopping the diclofenac his arthritis in his hands has been more bothersome. States he has exercises at home but has not yet started doing them. Discussed physical/occupational therapy, however pt reluctant since he lives far from the nearest therapy options.     Past Medical History:   Diagnosis Date    Bladder disease     CAD (coronary artery disease) 11/09/2016    hx     Cancer     SKIN CANCER BASAL LEFT EYE AND EAR  AND SQAMOUS CELL RIGHT SIDE TORSO    Chest pain     DENIES ANY RECENT CP/SOA. FOLLOWED BY DR MARCELINO AND TRANSITION TO DR WHIPPLE. REPORTS HAS 35 ACR[address] OF LAND AND WORKS ON IT    Coronary atherosclerosis of native coronary vessel 03/29/2015    Diabetes mellitus     type 2. DOES NOT CHECK BS DAILY    GERD (gastroesophageal reflux disease)     Hard of hearing     HEARS BETTER OUT OF LEFT EAR. BILATERAL HEARING AID    Heart attack     HTN (hypertension)     Hyperlipemia     Osteoarthritis      BILATERAL HIPS    Paroxysmal atrial fibrillation 08/11/2021    Seasonal allergies     Sleep apnea     over 10 yrs ago    Stage 3a chronic kidney disease 08/11/2021       Allergies:   Allergies   Allergen Reactions    Penicillins Rash          Past Surgical History:   Procedure Laterality Date    APPENDECTOMY      1957    COLONOSCOPY      diverticulosis    COLONOSCOPY N/A 5/20/2024    Procedure: COLONOSCOPY;  Surgeon: Justin Burris MD;  Location: [facility] ENDOSCOPY;  Service: General;  Laterality: N/A;  DIVERTICULOSIS    CORONARY ARTERY BYPASS GRAFT      2/2011    HERNIA REPAIR      SKIN CANCER EXCISION Right     TESTICLE SURGERY  1974    TOTAL HIP ARTHROPLASTY Right 10/03/2022    Procedure: TOTAL HIP ARTHROPLASTY ANTERIOR RIGHT;  Surgeon: Ravinder Hawley MD;  Location: [facility] MAIN OR;  Service: Orthopedics;  Laterality: Right;          Social History     Socioeconomic History    Marital status:    Tobacco Use    Smoking status: Never    Smokeless tobacco: Never   Vaping Use    Vaping status: Never Used   Substance and Sexual Activity    Alcohol use: Yes     Comment: OCC    Drug use: Never    Sexual activity: Defer         Family History   Problem Relation Age of Onset    Melanoma Mother     Breast cancer Sister     Colon cancer Maternal Grandmother     Diabetes Maternal Grandmother     Lung cancer Maternal Grandfather     Breast cancer Paternal Grandmother     Malig Hyperthermia Neg Hx           Health Maintenance Due   Topic Date Due    HEPATITIS C SCREENING  Never done    ZOSTER VACCINE (3 of 3) 08/07/2023    RSV Vaccine - Adults (1 - 1-dose 75+ series) Never done    DIABETIC FOOT EXAM  04/02/2025    COVID-19 Vaccine (8 - 2024-25 season) 04/26/2025            Current Outpatient Medications:     Eliquis 5 MG tablet tablet, TAKE 1 TABLET TWICE DAILY, Disp: 180 tablet, Rfl: 3    metFORMIN ER (GLUCOPHAGE-XR) 500 MG 24 hr tablet, TAKE 1 TABLET TWICE DAILY WITH MEALS, Disp: 180 tablet, Rfl: 3    metoprolol  succinate XL (TOPROL-XL) 50 MG 24 hr tablet, TAKE 1 TABLET EVERY DAY, Disp: 90 tablet, Rfl: 3    multivitamin with minerals tablet tablet, Take 1 tablet by mouth Daily., Disp: , Rfl:     nitroglycerin (NITROSTAT) 0.4 MG SL tablet, Place 1 tablet under the tongue Every 5 (Five) Minutes As Needed for Chest Pain. (Patient taking differently: Place 1 tablet under the tongue As Needed for Chest Pain.), Disp: 30 tablet, Rfl: 3    rosuvastatin (CRESTOR) 40 MG tablet, TAKE 1 TABLET EVERY DAY, Disp: 90 tablet, Rfl: 3    sildenafil (Viagra) 50 MG tablet, Take 1 tablet by mouth Daily As Needed for Erectile Dysfunction., Disp: 8 tablet, Rfl: 3    spironolactone (ALDACTONE) 25 MG tablet, TAKE 1 TABLET EVERY OTHER DAY, Disp: 45 tablet, Rfl: 3    valsartan-hydrochlorothiazide (DIOVAN-HCT) 160-25 MG per tablet, TAKE 1 TABLET EVERY DAY, Disp: 90 tablet, Rfl: 3    Semaglutide,0.25 or 0.5MG/DOS, (Ozempic, 0.25 or 0.5 MG/DOSE,) 2 MG/3ML solution pen-injector, Take 0.25mg weekly for 4 weeks, then take 0.5mg dose weekly thereafter., Disp: 3 mL, Rfl: 1      Immunization History   Administered Date(s) Administered    COVID-19 (MODERNA) 12YRS+ (SPIKEVAX) 10/30/2023, 10/26/2024    COVID-19 (MODERNA) 1st,2nd,3rd Dose Monovalent 01/08/2021, 02/05/2021, 11/10/2021    COVID-19 (MODERNA) BIVALENT 12+YRS 11/09/2022    COVID-19 (MODERNA) Monovalent Original Booster 04/27/2022    Fluad Quad 65+ 10/30/2023    Fluzone High-Dose 65+YRS 10/26/2024    Fluzone High-Dose 65+yrs 10/13/2022    Fluzone Quad >6mos (Multi-dose) 09/25/2020    Hepatitis A 11/25/2020    Pneumococcal Conjugate 13-Valent (PCV13) 10/20/2015    Pneumococcal Conjugate 20-Valent (PCV20) 06/12/2023    Pneumococcal Polysaccharide (PPSV23) 11/06/2013    Shingrix 06/12/2023    Td (TDVAX) 01/05/2006    Tdap 04/12/2017    Zostavax 05/30/2013       Review of Systems     Objective       Vitals:    06/11/25 0842   BP: 128/72   BP Location: Left arm   Patient Position: Sitting   Cuff Size: Large  Adult   Pulse: 57   Resp: 16   Temp: 96.9 °F (36.1 °C)   TempSrc: Temporal   SpO2: 98%   Weight: 116 kg (255 lb 12.8 oz)     Physical Exam  Vitals reviewed.   Constitutional:       Appearance: Normal appearance.   HENT:      Head: Normocephalic and atraumatic.      Nose: Nose normal.   Eyes:      Extraocular Movements: Extraocular movements intact.      Conjunctiva/sclera: Conjunctivae normal.   Cardiovascular:      Rate and Rhythm: Normal rate and regular rhythm.      Pulses: Normal pulses.      Heart sounds: Normal heart sounds.   Pulmonary:      Effort: Pulmonary effort is normal. No respiratory distress.      Breath sounds: Normal breath sounds.   Musculoskeletal:         General: No swelling (wearing compression stockings.). Normal range of motion.   Skin:     General: Skin is warm and dry.   Neurological:      General: No focal deficit present.      Mental Status: He is alert and oriented to person, place, and time.      Cranial Nerves: No cranial nerve deficit.   Psychiatric:         Mood and Affect: Mood normal.         Behavior: Behavior normal.         Thought Content: Thought content normal.       Result Review :                           Assessment and Plan        Assessment & Plan  Type 2 diabetes mellitus with hyperglycemia, without long-term current use of insulin  Chronic, Due for labs. Starting ozempic with plan to stop metformin.   Orders:    CBC & Differential    Comprehensive Metabolic Panel    Lipid Panel    TSH    Hemoglobin A1c    Microalbumin / Creatinine Urine Ratio - Urine, Random Void    Semaglutide,0.25 or 0.5MG/DOS, (Ozempic, 0.25 or 0.5 MG/DOSE,) 2 MG/3ML solution pen-injector; Take 0.25mg weekly for 4 weeks, then take 0.5mg dose weekly thereafter.    Primary hypertension  Chronic, and stable on current regimen.       Stage 3a chronic kidney disease  Chronic, due for labs.          Leg swelling  Chronic, improved now that pt is following w/ lymphedema clinic and wearing compression  stockings.                Follow Up     Return in about 3 months (around 9/11/2025) for Diabetes, f/u ozempic management .    Patient was given instructions and counseling regarding his condition or for health maintenance advice. Please see specific information pulled into the AVS if appropriate.     Belinda Bocanegra MD   Internal Medicine-Pediatrics

## 2025-06-11 NOTE — ASSESSMENT & PLAN NOTE
Chronic, Due for labs. Starting ozempic with plan to stop metformin.   Orders:    CBC & Differential    Comprehensive Metabolic Panel    Lipid Panel    TSH    Hemoglobin A1c    Microalbumin / Creatinine Urine Ratio - Urine, Random Void    Semaglutide,0.25 or 0.5MG/DOS, (Ozempic, 0.25 or 0.5 MG/DOSE,) 2 MG/3ML solution pen-injector; Take 0.25mg weekly for 4 weeks, then take 0.5mg dose weekly thereafter.

## 2025-06-23 DIAGNOSIS — N18.32 STAGE 3B CHRONIC KIDNEY DISEASE: Primary | ICD-10-CM

## 2025-07-21 RX ORDER — VALSARTAN AND HYDROCHLOROTHIAZIDE 160; 25 MG/1; MG/1
1 TABLET ORAL DAILY
Qty: 90 TABLET | Refills: 3 | Status: SHIPPED | OUTPATIENT
Start: 2025-07-21

## 2025-07-31 ENCOUNTER — TELEPHONE (OUTPATIENT)
Dept: INTERNAL MEDICINE | Facility: CLINIC | Age: 75
End: 2025-07-31
Payer: MEDICARE

## 2025-07-31 DIAGNOSIS — E11.65 TYPE 2 DIABETES MELLITUS WITH HYPERGLYCEMIA, WITHOUT LONG-TERM CURRENT USE OF INSULIN: Primary | ICD-10-CM

## 2025-08-04 ENCOUNTER — OFFICE VISIT (OUTPATIENT)
Dept: CARDIOLOGY | Facility: CLINIC | Age: 75
End: 2025-08-04
Payer: MEDICARE

## 2025-08-04 VITALS
HEIGHT: 72 IN | HEART RATE: 57 BPM | WEIGHT: 254 LBS | SYSTOLIC BLOOD PRESSURE: 137 MMHG | BODY MASS INDEX: 34.4 KG/M2 | DIASTOLIC BLOOD PRESSURE: 59 MMHG

## 2025-08-04 DIAGNOSIS — I10 HYPERTENSION, ESSENTIAL: ICD-10-CM

## 2025-08-04 DIAGNOSIS — I48.0 PAROXYSMAL ATRIAL FIBRILLATION: ICD-10-CM

## 2025-08-04 DIAGNOSIS — E78.2 HYPERLIPEMIA, MIXED: ICD-10-CM

## 2025-08-04 DIAGNOSIS — I25.10 CORONARY ARTERY DISEASE INVOLVING NATIVE CORONARY ARTERY OF NATIVE HEART WITHOUT ANGINA PECTORIS: Primary | ICD-10-CM

## 2025-08-04 PROCEDURE — 99214 OFFICE O/P EST MOD 30 MIN: CPT | Performed by: INTERNAL MEDICINE

## 2025-08-04 PROCEDURE — 1160F RVW MEDS BY RX/DR IN RCRD: CPT | Performed by: INTERNAL MEDICINE

## 2025-08-04 PROCEDURE — 1159F MED LIST DOCD IN RCRD: CPT | Performed by: INTERNAL MEDICINE

## 2025-08-04 PROCEDURE — 3078F DIAST BP <80 MM HG: CPT | Performed by: INTERNAL MEDICINE

## 2025-08-04 PROCEDURE — 3075F SYST BP GE 130 - 139MM HG: CPT | Performed by: INTERNAL MEDICINE

## (undated) DEVICE — SOLIDIFIER LIQLOC PLS 1500CC BT

## (undated) DEVICE — GLV SURG BIOGEL LTX PF 8 1/2

## (undated) DEVICE — 450 ML BOTTLE OF 0.05% CHLORHEXIDINE GLUCONATE IN 99.95% STERILE WATER FOR IRRIGATION, USP AND APPLICATOR.: Brand: IRRISEPT ANTIMICROBIAL WOUND LAVAGE

## (undated) DEVICE — STRYKER PERFORMANCE SERIES SAGITTAL BLADE: Brand: STRYKER PERFORMANCE SERIES

## (undated) DEVICE — LINER SURG CANSTR SXN S/RIGD 1500CC

## (undated) DEVICE — GLV SURG SENSICARE PI PF LF 7 GRN STRL

## (undated) DEVICE — CVR LEG BOOTLEG F/R NOSKID 33IN

## (undated) DEVICE — GOWN,REINFRCE,POLY,SIRUS,BREATH SLV,XXLG: Brand: MEDLINE

## (undated) DEVICE — 3M™ STERI-DRAPE™ U-DRAPE 1015: Brand: STERI-DRAPE™

## (undated) DEVICE — TOTAL ANTERIOR HIP-LF: Brand: MEDLINE INDUSTRIES, INC.

## (undated) DEVICE — APPL CHLORAPREP HI/LITE 26ML ORNG

## (undated) DEVICE — PULLOVER TOGA, 2X LARGE: Brand: FLYTE, SURGICOOL

## (undated) DEVICE — SLV SCD KN/LEN ADJ EXPRSS BLENDED MD 1P/U

## (undated) DEVICE — LINER ACET G7 NTRL E1 SZE 36MM
Type: IMPLANTABLE DEVICE | Site: HIP | Status: NON-FUNCTIONAL
Removed: 2022-10-03

## (undated) DEVICE — GLV SURG SENSICARE SLT PF LF 7 STRL

## (undated) DEVICE — GLV SURG SENSICARE SLT PF LF 6.5 STRL

## (undated) DEVICE — KT PT POSITION SUPINE HANA/PROFX TABL

## (undated) DEVICE — SUT VIC 2/0 CT1 36IN

## (undated) DEVICE — Device

## (undated) DEVICE — ANTIBACTERIAL VIOLET BRAIDED (POLYGLACTIN 910), SYNTHETIC ABSORBABLE SURGICAL SUTURE: Brand: COATED VICRYL

## (undated) DEVICE — SOL IRR NACL 0.9PCT BT 1000ML

## (undated) DEVICE — PENCL E/S SMOKEEVAC W/TELESCP CANN

## (undated) DEVICE — SOL IRRG H2O PL/BG 1000ML STRL

## (undated) DEVICE — PROXIMATE RH ROTATING HEAD SKIN STAPLERS (35 WIDE) CONTAINS 35 STAINLESS STEEL STAPLES: Brand: PROXIMATE

## (undated) DEVICE — TOWEL,OR,DSP,ST,BLUE,STD,4/PK,20PK/CS: Brand: MEDLINE

## (undated) DEVICE — SCRW ACET CORT TRILOGY S/TAP 6.5X30
Type: IMPLANTABLE DEVICE | Site: HIP | Status: NON-FUNCTIONAL
Removed: 2022-10-03

## (undated) DEVICE — MEDI-VAC NON-CONDUCTIVE SUCTION TUBING: Brand: CARDINAL HEALTH

## (undated) DEVICE — GAUZE,SPONGE,4"X4",16PLY,STRL,LF,10/TRAY: Brand: MEDLINE

## (undated) DEVICE — STERILE POLYISOPRENE POWDER-FREE SURGICAL GLOVES: Brand: PROTEXIS

## (undated) DEVICE — Device: Brand: DEFENDO AIR/WATER/SUCTION AND BIOPSY VALVE

## (undated) DEVICE — ELECTRD BLD EDGE COAT 3IN

## (undated) DEVICE — GLV SURG SENSICARE SLT PF LF 8.5 STRL

## (undated) DEVICE — CONN JET HYDRA H20 AUXILIARY DISP

## (undated) DEVICE — SST TWIST DRILL, STANDARD, 2.8MM DIA. X 127MM: Brand: MICROAIRE®

## (undated) DEVICE — BIPOLAR SEALER 23-112-1 AQM 6.0: Brand: AQUAMANTYS™

## (undated) DEVICE — PEEL-AWAY TOGA, 2X LARGE: Brand: FLYTE

## (undated) DEVICE — MAT FLR ABS W/BLU/LINER 56X72IN WHT